# Patient Record
Sex: FEMALE | Race: ASIAN | NOT HISPANIC OR LATINO
[De-identification: names, ages, dates, MRNs, and addresses within clinical notes are randomized per-mention and may not be internally consistent; named-entity substitution may affect disease eponyms.]

---

## 2023-07-10 PROBLEM — Z00.00 ENCOUNTER FOR PREVENTIVE HEALTH EXAMINATION: Status: ACTIVE | Noted: 2023-07-10

## 2023-07-13 ENCOUNTER — APPOINTMENT (OUTPATIENT)
Dept: MAMMOGRAPHY | Facility: CLINIC | Age: 73
End: 2023-07-13
Payer: MEDICAID

## 2023-07-13 ENCOUNTER — OUTPATIENT (OUTPATIENT)
Dept: OUTPATIENT SERVICES | Facility: HOSPITAL | Age: 73
LOS: 1 days | End: 2023-07-13
Payer: MEDICAID

## 2023-07-13 DIAGNOSIS — Z00.8 ENCOUNTER FOR OTHER GENERAL EXAMINATION: ICD-10-CM

## 2023-07-13 PROCEDURE — 77063 BREAST TOMOSYNTHESIS BI: CPT

## 2023-07-13 PROCEDURE — 77063 BREAST TOMOSYNTHESIS BI: CPT | Mod: 26

## 2023-07-13 PROCEDURE — 77067 SCR MAMMO BI INCL CAD: CPT | Mod: 26

## 2023-07-13 PROCEDURE — 77067 SCR MAMMO BI INCL CAD: CPT

## 2023-07-26 ENCOUNTER — APPOINTMENT (OUTPATIENT)
Dept: MAMMOGRAPHY | Facility: CLINIC | Age: 73
End: 2023-07-26
Payer: MEDICAID

## 2023-07-26 ENCOUNTER — OUTPATIENT (OUTPATIENT)
Dept: OUTPATIENT SERVICES | Facility: HOSPITAL | Age: 73
LOS: 1 days | End: 2023-07-26
Payer: MEDICAID

## 2023-07-26 ENCOUNTER — APPOINTMENT (OUTPATIENT)
Dept: ULTRASOUND IMAGING | Facility: CLINIC | Age: 73
End: 2023-07-26
Payer: MEDICAID

## 2023-07-26 DIAGNOSIS — Z00.8 ENCOUNTER FOR OTHER GENERAL EXAMINATION: ICD-10-CM

## 2023-07-26 PROCEDURE — 76642 ULTRASOUND BREAST LIMITED: CPT | Mod: 26,LT

## 2023-07-26 PROCEDURE — G0279: CPT

## 2023-07-26 PROCEDURE — G0279: CPT | Mod: 26

## 2023-07-26 PROCEDURE — 77065 DX MAMMO INCL CAD UNI: CPT

## 2023-07-26 PROCEDURE — 76642 ULTRASOUND BREAST LIMITED: CPT

## 2023-07-26 PROCEDURE — 77065 DX MAMMO INCL CAD UNI: CPT | Mod: 26,LT

## 2023-10-04 ENCOUNTER — APPOINTMENT (OUTPATIENT)
Dept: ORTHOPEDIC SURGERY | Facility: CLINIC | Age: 73
End: 2023-10-04
Payer: MEDICAID

## 2023-10-04 ENCOUNTER — NON-APPOINTMENT (OUTPATIENT)
Age: 73
End: 2023-10-04

## 2023-10-04 VITALS
DIASTOLIC BLOOD PRESSURE: 81 MMHG | SYSTOLIC BLOOD PRESSURE: 137 MMHG | BODY MASS INDEX: 31.89 KG/M2 | HEIGHT: 63 IN | WEIGHT: 180 LBS | HEART RATE: 70 BPM

## 2023-10-04 DIAGNOSIS — Z87.39 PERSONAL HISTORY OF OTHER DISEASES OF THE MUSCULOSKELETAL SYSTEM AND CONNECTIVE TISSUE: ICD-10-CM

## 2023-10-04 DIAGNOSIS — M54.2 CERVICALGIA: ICD-10-CM

## 2023-10-04 DIAGNOSIS — M75.42 IMPINGEMENT SYNDROME OF LEFT SHOULDER: ICD-10-CM

## 2023-10-04 DIAGNOSIS — M50.30 OTHER CERVICAL DISC DEGENERATION, UNSPECIFIED CERVICAL REGION: ICD-10-CM

## 2023-10-04 DIAGNOSIS — M75.41 IMPINGEMENT SYNDROME OF RIGHT SHOULDER: ICD-10-CM

## 2023-10-04 DIAGNOSIS — G89.29 CERVICALGIA: ICD-10-CM

## 2023-10-04 DIAGNOSIS — M75.02 ADHESIVE CAPSULITIS OF LEFT SHOULDER: ICD-10-CM

## 2023-10-04 DIAGNOSIS — M54.12 RADICULOPATHY, CERVICAL REGION: ICD-10-CM

## 2023-10-04 PROCEDURE — 72050 X-RAY EXAM NECK SPINE 4/5VWS: CPT

## 2023-10-04 PROCEDURE — 20610 DRAIN/INJ JOINT/BURSA W/O US: CPT | Mod: LT

## 2023-10-04 PROCEDURE — 72110 X-RAY EXAM L-2 SPINE 4/>VWS: CPT

## 2023-10-04 PROCEDURE — 99204 OFFICE O/P NEW MOD 45 MIN: CPT | Mod: 25

## 2023-10-04 RX ORDER — LIDOCAINE HYDROCHLORIDE 5 MG/ML
0.5 INJECTION, SOLUTION INFILTRATION; PERINEURAL
Refills: 0 | Status: COMPLETED | OUTPATIENT
Start: 2023-10-04

## 2023-10-04 RX ORDER — METHYLPRED ACET/NACL,ISO-OS/PF 40 MG/ML
40 VIAL (ML) INJECTION
Qty: 1 | Refills: 0 | Status: COMPLETED | OUTPATIENT
Start: 2023-10-04

## 2023-10-04 RX ORDER — BUPIVACAINE HCL/PF 2.5 MG/ML
0.25 VIAL (ML) INJECTION
Refills: 0 | Status: COMPLETED | OUTPATIENT
Start: 2023-10-04

## 2023-10-04 RX ADMIN — METHYLPREDNISOLONE ACETATE 1 MG/ML: 40 INJECTION, SUSPENSION INTRA-ARTICULAR; INTRALESIONAL; INTRAMUSCULAR; SOFT TISSUE at 00:00

## 2023-10-04 RX ADMIN — LIDOCAINE HYDROCHLORIDE 3 %: 5 INJECTION, SOLUTION INFILTRATION; INTRAVENOUS at 00:00

## 2023-10-04 RX ADMIN — BUPIVACAINE HYDROCHLORIDE 3 %: 2.5 INJECTION, SOLUTION INFILTRATION; PERINEURAL at 00:00

## 2024-01-08 ENCOUNTER — NON-APPOINTMENT (OUTPATIENT)
Age: 74
End: 2024-01-08

## 2024-01-11 ENCOUNTER — INPATIENT (INPATIENT)
Facility: HOSPITAL | Age: 74
LOS: 4 days | Discharge: HOME | DRG: 872 | End: 2024-01-16
Attending: INTERNAL MEDICINE | Admitting: INTERNAL MEDICINE
Payer: MEDICAID

## 2024-01-11 VITALS
SYSTOLIC BLOOD PRESSURE: 149 MMHG | OXYGEN SATURATION: 97 % | WEIGHT: 182.98 LBS | HEART RATE: 99 BPM | RESPIRATION RATE: 22 BRPM | DIASTOLIC BLOOD PRESSURE: 84 MMHG | HEIGHT: 64 IN | TEMPERATURE: 99 F

## 2024-01-11 DIAGNOSIS — N12 TUBULO-INTERSTITIAL NEPHRITIS, NOT SPECIFIED AS ACUTE OR CHRONIC: ICD-10-CM

## 2024-01-11 LAB
ALBUMIN SERPL ELPH-MCNC: 3.1 G/DL — LOW (ref 3.3–5)
ALBUMIN SERPL ELPH-MCNC: 3.1 G/DL — LOW (ref 3.3–5)
ALP SERPL-CCNC: 169 U/L — HIGH (ref 30–120)
ALP SERPL-CCNC: 169 U/L — HIGH (ref 30–120)
ALT FLD-CCNC: 41 U/L — SIGNIFICANT CHANGE UP (ref 10–60)
ALT FLD-CCNC: 41 U/L — SIGNIFICANT CHANGE UP (ref 10–60)
ANION GAP SERPL CALC-SCNC: 10 MMOL/L — SIGNIFICANT CHANGE UP (ref 5–17)
ANION GAP SERPL CALC-SCNC: 10 MMOL/L — SIGNIFICANT CHANGE UP (ref 5–17)
APPEARANCE UR: CLEAR — SIGNIFICANT CHANGE UP
APPEARANCE UR: CLEAR — SIGNIFICANT CHANGE UP
APTT BLD: 30.9 SEC — SIGNIFICANT CHANGE UP (ref 24.5–35.6)
APTT BLD: 30.9 SEC — SIGNIFICANT CHANGE UP (ref 24.5–35.6)
AST SERPL-CCNC: 30 U/L — SIGNIFICANT CHANGE UP (ref 10–40)
AST SERPL-CCNC: 30 U/L — SIGNIFICANT CHANGE UP (ref 10–40)
BACTERIA # UR AUTO: NEGATIVE /HPF — SIGNIFICANT CHANGE UP
BACTERIA # UR AUTO: NEGATIVE /HPF — SIGNIFICANT CHANGE UP
BASOPHILS # BLD AUTO: 0.03 K/UL — SIGNIFICANT CHANGE UP (ref 0–0.2)
BASOPHILS # BLD AUTO: 0.03 K/UL — SIGNIFICANT CHANGE UP (ref 0–0.2)
BASOPHILS NFR BLD AUTO: 0.3 % — SIGNIFICANT CHANGE UP (ref 0–2)
BASOPHILS NFR BLD AUTO: 0.3 % — SIGNIFICANT CHANGE UP (ref 0–2)
BILIRUB SERPL-MCNC: 0.6 MG/DL — SIGNIFICANT CHANGE UP (ref 0.2–1.2)
BILIRUB SERPL-MCNC: 0.6 MG/DL — SIGNIFICANT CHANGE UP (ref 0.2–1.2)
BILIRUB UR-MCNC: NEGATIVE — SIGNIFICANT CHANGE UP
BILIRUB UR-MCNC: NEGATIVE — SIGNIFICANT CHANGE UP
BUN SERPL-MCNC: 11 MG/DL — SIGNIFICANT CHANGE UP (ref 7–23)
BUN SERPL-MCNC: 11 MG/DL — SIGNIFICANT CHANGE UP (ref 7–23)
CALCIUM SERPL-MCNC: 9.3 MG/DL — SIGNIFICANT CHANGE UP (ref 8.4–10.5)
CALCIUM SERPL-MCNC: 9.3 MG/DL — SIGNIFICANT CHANGE UP (ref 8.4–10.5)
CHLORIDE SERPL-SCNC: 101 MMOL/L — SIGNIFICANT CHANGE UP (ref 96–108)
CHLORIDE SERPL-SCNC: 101 MMOL/L — SIGNIFICANT CHANGE UP (ref 96–108)
CO2 SERPL-SCNC: 23 MMOL/L — SIGNIFICANT CHANGE UP (ref 22–31)
CO2 SERPL-SCNC: 23 MMOL/L — SIGNIFICANT CHANGE UP (ref 22–31)
COLOR SPEC: SIGNIFICANT CHANGE UP
COLOR SPEC: SIGNIFICANT CHANGE UP
CREAT SERPL-MCNC: 0.96 MG/DL — SIGNIFICANT CHANGE UP (ref 0.5–1.3)
CREAT SERPL-MCNC: 0.96 MG/DL — SIGNIFICANT CHANGE UP (ref 0.5–1.3)
DIFF PNL FLD: ABNORMAL
DIFF PNL FLD: ABNORMAL
EGFR: 63 ML/MIN/1.73M2 — SIGNIFICANT CHANGE UP
EGFR: 63 ML/MIN/1.73M2 — SIGNIFICANT CHANGE UP
EOSINOPHIL # BLD AUTO: 0.21 K/UL — SIGNIFICANT CHANGE UP (ref 0–0.5)
EOSINOPHIL # BLD AUTO: 0.21 K/UL — SIGNIFICANT CHANGE UP (ref 0–0.5)
EOSINOPHIL NFR BLD AUTO: 1.8 % — SIGNIFICANT CHANGE UP (ref 0–6)
EOSINOPHIL NFR BLD AUTO: 1.8 % — SIGNIFICANT CHANGE UP (ref 0–6)
EPI CELLS # UR: SIGNIFICANT CHANGE UP
EPI CELLS # UR: SIGNIFICANT CHANGE UP
GLUCOSE SERPL-MCNC: 139 MG/DL — HIGH (ref 70–99)
GLUCOSE SERPL-MCNC: 139 MG/DL — HIGH (ref 70–99)
GLUCOSE UR QL: NEGATIVE MG/DL — SIGNIFICANT CHANGE UP
GLUCOSE UR QL: NEGATIVE MG/DL — SIGNIFICANT CHANGE UP
HCT VFR BLD CALC: 38.3 % — SIGNIFICANT CHANGE UP (ref 34.5–45)
HCT VFR BLD CALC: 38.3 % — SIGNIFICANT CHANGE UP (ref 34.5–45)
HGB BLD-MCNC: 12.5 G/DL — SIGNIFICANT CHANGE UP (ref 11.5–15.5)
HGB BLD-MCNC: 12.5 G/DL — SIGNIFICANT CHANGE UP (ref 11.5–15.5)
IMM GRANULOCYTES NFR BLD AUTO: 0.3 % — SIGNIFICANT CHANGE UP (ref 0–0.9)
IMM GRANULOCYTES NFR BLD AUTO: 0.3 % — SIGNIFICANT CHANGE UP (ref 0–0.9)
INR BLD: 1.21 RATIO — HIGH (ref 0.85–1.18)
INR BLD: 1.21 RATIO — HIGH (ref 0.85–1.18)
KETONES UR-MCNC: NEGATIVE MG/DL — SIGNIFICANT CHANGE UP
KETONES UR-MCNC: NEGATIVE MG/DL — SIGNIFICANT CHANGE UP
LACTATE SERPL-SCNC: 1 MMOL/L — SIGNIFICANT CHANGE UP (ref 0.7–2)
LACTATE SERPL-SCNC: 1 MMOL/L — SIGNIFICANT CHANGE UP (ref 0.7–2)
LEUKOCYTE ESTERASE UR-ACNC: ABNORMAL
LEUKOCYTE ESTERASE UR-ACNC: ABNORMAL
LYMPHOCYTES # BLD AUTO: 17.9 % — SIGNIFICANT CHANGE UP (ref 13–44)
LYMPHOCYTES # BLD AUTO: 17.9 % — SIGNIFICANT CHANGE UP (ref 13–44)
LYMPHOCYTES # BLD AUTO: 2.08 K/UL — SIGNIFICANT CHANGE UP (ref 1–3.3)
LYMPHOCYTES # BLD AUTO: 2.08 K/UL — SIGNIFICANT CHANGE UP (ref 1–3.3)
MCHC RBC-ENTMCNC: 29.2 PG — SIGNIFICANT CHANGE UP (ref 27–34)
MCHC RBC-ENTMCNC: 29.2 PG — SIGNIFICANT CHANGE UP (ref 27–34)
MCHC RBC-ENTMCNC: 32.6 GM/DL — SIGNIFICANT CHANGE UP (ref 32–36)
MCHC RBC-ENTMCNC: 32.6 GM/DL — SIGNIFICANT CHANGE UP (ref 32–36)
MCV RBC AUTO: 89.5 FL — SIGNIFICANT CHANGE UP (ref 80–100)
MCV RBC AUTO: 89.5 FL — SIGNIFICANT CHANGE UP (ref 80–100)
MONOCYTES # BLD AUTO: 1.42 K/UL — HIGH (ref 0–0.9)
MONOCYTES # BLD AUTO: 1.42 K/UL — HIGH (ref 0–0.9)
MONOCYTES NFR BLD AUTO: 12.2 % — SIGNIFICANT CHANGE UP (ref 2–14)
MONOCYTES NFR BLD AUTO: 12.2 % — SIGNIFICANT CHANGE UP (ref 2–14)
NEUTROPHILS # BLD AUTO: 7.82 K/UL — HIGH (ref 1.8–7.4)
NEUTROPHILS # BLD AUTO: 7.82 K/UL — HIGH (ref 1.8–7.4)
NEUTROPHILS NFR BLD AUTO: 67.5 % — SIGNIFICANT CHANGE UP (ref 43–77)
NEUTROPHILS NFR BLD AUTO: 67.5 % — SIGNIFICANT CHANGE UP (ref 43–77)
NITRITE UR-MCNC: POSITIVE
NITRITE UR-MCNC: POSITIVE
NRBC # BLD: 0 /100 WBCS — SIGNIFICANT CHANGE UP (ref 0–0)
NRBC # BLD: 0 /100 WBCS — SIGNIFICANT CHANGE UP (ref 0–0)
PH UR: 7 — SIGNIFICANT CHANGE UP (ref 5–8)
PH UR: 7 — SIGNIFICANT CHANGE UP (ref 5–8)
PLATELET # BLD AUTO: 290 K/UL — SIGNIFICANT CHANGE UP (ref 150–400)
PLATELET # BLD AUTO: 290 K/UL — SIGNIFICANT CHANGE UP (ref 150–400)
POTASSIUM SERPL-MCNC: 4 MMOL/L — SIGNIFICANT CHANGE UP (ref 3.5–5.3)
POTASSIUM SERPL-MCNC: 4 MMOL/L — SIGNIFICANT CHANGE UP (ref 3.5–5.3)
POTASSIUM SERPL-SCNC: 4 MMOL/L — SIGNIFICANT CHANGE UP (ref 3.5–5.3)
POTASSIUM SERPL-SCNC: 4 MMOL/L — SIGNIFICANT CHANGE UP (ref 3.5–5.3)
PROT SERPL-MCNC: 8 G/DL — SIGNIFICANT CHANGE UP (ref 6–8.3)
PROT SERPL-MCNC: 8 G/DL — SIGNIFICANT CHANGE UP (ref 6–8.3)
PROT UR-MCNC: 30 MG/DL
PROT UR-MCNC: 30 MG/DL
PROTHROM AB SERPL-ACNC: 13.1 SEC — HIGH (ref 9.5–13)
PROTHROM AB SERPL-ACNC: 13.1 SEC — HIGH (ref 9.5–13)
RBC # BLD: 4.28 M/UL — SIGNIFICANT CHANGE UP (ref 3.8–5.2)
RBC # BLD: 4.28 M/UL — SIGNIFICANT CHANGE UP (ref 3.8–5.2)
RBC # FLD: 14.4 % — SIGNIFICANT CHANGE UP (ref 10.3–14.5)
RBC # FLD: 14.4 % — SIGNIFICANT CHANGE UP (ref 10.3–14.5)
RBC CASTS # UR COMP ASSIST: 1 /HPF — SIGNIFICANT CHANGE UP (ref 0–4)
RBC CASTS # UR COMP ASSIST: 1 /HPF — SIGNIFICANT CHANGE UP (ref 0–4)
SODIUM SERPL-SCNC: 134 MMOL/L — LOW (ref 135–145)
SODIUM SERPL-SCNC: 134 MMOL/L — LOW (ref 135–145)
SP GR SPEC: 1.03 — SIGNIFICANT CHANGE UP (ref 1–1.03)
SP GR SPEC: 1.03 — SIGNIFICANT CHANGE UP (ref 1–1.03)
UROBILINOGEN FLD QL: 0.2 MG/DL — SIGNIFICANT CHANGE UP (ref 0.2–1)
UROBILINOGEN FLD QL: 0.2 MG/DL — SIGNIFICANT CHANGE UP (ref 0.2–1)
WBC # BLD: 11.6 K/UL — HIGH (ref 3.8–10.5)
WBC # BLD: 11.6 K/UL — HIGH (ref 3.8–10.5)
WBC # FLD AUTO: 11.6 K/UL — HIGH (ref 3.8–10.5)
WBC # FLD AUTO: 11.6 K/UL — HIGH (ref 3.8–10.5)
WBC UR QL: 9 /HPF — HIGH (ref 0–5)
WBC UR QL: 9 /HPF — HIGH (ref 0–5)

## 2024-01-11 PROCEDURE — 71045 X-RAY EXAM CHEST 1 VIEW: CPT | Mod: 26

## 2024-01-11 PROCEDURE — 99285 EMERGENCY DEPT VISIT HI MDM: CPT

## 2024-01-11 PROCEDURE — 93010 ELECTROCARDIOGRAM REPORT: CPT

## 2024-01-11 PROCEDURE — 74177 CT ABD & PELVIS W/CONTRAST: CPT | Mod: 26,MA

## 2024-01-11 RX ORDER — ATORVASTATIN CALCIUM 80 MG/1
10 TABLET, FILM COATED ORAL AT BEDTIME
Refills: 0 | Status: DISCONTINUED | OUTPATIENT
Start: 2024-01-11 | End: 2024-01-16

## 2024-01-11 RX ORDER — GABAPENTIN 400 MG/1
1 CAPSULE ORAL
Refills: 0 | DISCHARGE

## 2024-01-11 RX ORDER — PANTOPRAZOLE SODIUM 20 MG/1
1 TABLET, DELAYED RELEASE ORAL
Refills: 0 | DISCHARGE

## 2024-01-11 RX ORDER — ZOLPIDEM TARTRATE 10 MG/1
5 TABLET ORAL AT BEDTIME
Refills: 0 | Status: DISCONTINUED | OUTPATIENT
Start: 2024-01-11 | End: 2024-01-16

## 2024-01-11 RX ORDER — ZOLPIDEM TARTRATE 10 MG/1
1 TABLET ORAL
Refills: 0 | DISCHARGE

## 2024-01-11 RX ORDER — ACETAMINOPHEN 500 MG
650 TABLET ORAL ONCE
Refills: 0 | Status: COMPLETED | OUTPATIENT
Start: 2024-01-11 | End: 2024-01-11

## 2024-01-11 RX ORDER — SODIUM CHLORIDE 9 MG/ML
1700 INJECTION INTRAMUSCULAR; INTRAVENOUS; SUBCUTANEOUS ONCE
Refills: 0 | Status: COMPLETED | OUTPATIENT
Start: 2024-01-11 | End: 2024-01-11

## 2024-01-11 RX ORDER — PANTOPRAZOLE SODIUM 20 MG/1
40 TABLET, DELAYED RELEASE ORAL
Refills: 0 | Status: DISCONTINUED | OUTPATIENT
Start: 2024-01-11 | End: 2024-01-16

## 2024-01-11 RX ORDER — CEFTRIAXONE 500 MG/1
1000 INJECTION, POWDER, FOR SOLUTION INTRAMUSCULAR; INTRAVENOUS ONCE
Refills: 0 | Status: COMPLETED | OUTPATIENT
Start: 2024-01-11 | End: 2024-01-11

## 2024-01-11 RX ORDER — AMLODIPINE BESYLATE 2.5 MG/1
1 TABLET ORAL
Refills: 0 | DISCHARGE

## 2024-01-11 RX ORDER — LEVOTHYROXINE SODIUM 125 MCG
1 TABLET ORAL
Refills: 0 | DISCHARGE

## 2024-01-11 RX ORDER — GABAPENTIN 400 MG/1
300 CAPSULE ORAL
Refills: 0 | Status: DISCONTINUED | OUTPATIENT
Start: 2024-01-11 | End: 2024-01-16

## 2024-01-11 RX ORDER — LEVOTHYROXINE SODIUM 125 MCG
50 TABLET ORAL DAILY
Refills: 0 | Status: DISCONTINUED | OUTPATIENT
Start: 2024-01-11 | End: 2024-01-16

## 2024-01-11 RX ORDER — ACETAMINOPHEN 500 MG
650 TABLET ORAL EVERY 6 HOURS
Refills: 0 | Status: DISCONTINUED | OUTPATIENT
Start: 2024-01-11 | End: 2024-01-16

## 2024-01-11 RX ORDER — SODIUM CHLORIDE 9 MG/ML
1000 INJECTION INTRAMUSCULAR; INTRAVENOUS; SUBCUTANEOUS
Refills: 0 | Status: DISCONTINUED | OUTPATIENT
Start: 2024-01-11 | End: 2024-01-13

## 2024-01-11 RX ORDER — ATORVASTATIN CALCIUM 80 MG/1
1 TABLET, FILM COATED ORAL
Refills: 0 | DISCHARGE

## 2024-01-11 RX ORDER — PROPRANOLOL HCL 160 MG
1 CAPSULE, EXTENDED RELEASE 24HR ORAL
Refills: 0 | DISCHARGE

## 2024-01-11 RX ORDER — AMLODIPINE BESYLATE 2.5 MG/1
10 TABLET ORAL DAILY
Refills: 0 | Status: DISCONTINUED | OUTPATIENT
Start: 2024-01-11 | End: 2024-01-16

## 2024-01-11 RX ORDER — ENOXAPARIN SODIUM 100 MG/ML
40 INJECTION SUBCUTANEOUS EVERY 24 HOURS
Refills: 0 | Status: DISCONTINUED | OUTPATIENT
Start: 2024-01-11 | End: 2024-01-16

## 2024-01-11 RX ORDER — CEFTRIAXONE 500 MG/1
1000 INJECTION, POWDER, FOR SOLUTION INTRAMUSCULAR; INTRAVENOUS EVERY 24 HOURS
Refills: 0 | Status: DISCONTINUED | OUTPATIENT
Start: 2024-01-12 | End: 2024-01-13

## 2024-01-11 RX ADMIN — SODIUM CHLORIDE 1700 MILLILITER(S): 9 INJECTION INTRAMUSCULAR; INTRAVENOUS; SUBCUTANEOUS at 23:32

## 2024-01-11 RX ADMIN — Medication 650 MILLIGRAM(S): at 23:38

## 2024-01-11 RX ADMIN — CEFTRIAXONE 100 MILLIGRAM(S): 500 INJECTION, POWDER, FOR SOLUTION INTRAMUSCULAR; INTRAVENOUS at 20:59

## 2024-01-11 RX ADMIN — SODIUM CHLORIDE 1700 MILLILITER(S): 9 INJECTION INTRAMUSCULAR; INTRAVENOUS; SUBCUTANEOUS at 20:59

## 2024-01-11 RX ADMIN — CEFTRIAXONE 1000 MILLIGRAM(S): 500 INJECTION, POWDER, FOR SOLUTION INTRAMUSCULAR; INTRAVENOUS at 23:00

## 2024-01-11 NOTE — CONSULT NOTE ADULT - SUBJECTIVE AND OBJECTIVE BOX
CHIEF COMPLAINT/ REASON FOR VISIT  .. Patient was seen to address the  issue listed under PROBLEM LIST which is located toward bottom of this note     PACHECO TREVINOTI    SY EDIP 19    Allergies    No Known Allergies    Intolerances        PAST MEDICAL & SURGICAL HISTORY:  HTN (hypertension)      Hypothyroid          FAMILY HISTORY:      Home Medications:  Ambien 10 mg oral tablet: 1 tab(s) orally once a day (at bedtime) (2024 23:07)  amLODIPine 10 mg oral tablet: 1 tab(s) orally once a day (2024 23:07)  atorvastatin 10 mg oral tablet: 1 tab(s) orally once a day (at bedtime) (2024 23:07)  gabapentin 300 mg oral capsule: 1 cap(s) orally 2 times a day (2024 23:07)  levothyroxine 50 mcg (0.05 mg) oral tablet: 1 tab(s) orally once a day (2024 23:07)  pantoprazole 20 mg oral delayed release tablet: 1 tab(s) orally once a day (at bedtime) (2024 23:07)  propranolol 20 mg oral tablet: 1 tab(s) orally once a day (at bedtime) (2024 23:07)      MEDICATIONS  (STANDING):  acetaminophen     Tablet .. 650 milliGRAM(s) Oral once    MEDICATIONS  (PRN):              Vital Signs Last 24 Hrs  T(C): 37.4 (2024 20:11), Max: 37.4 (2024 20:11)  T(F): 99.4 (2024 20:11), Max: 99.4 (2024 20:11)  HR: 99 (2024 20:11) (99 - 99)  BP: 149/84 (2024 20:11) (149/84 - 149/84)  BP(mean): --  RR: 22 (2024 20:11) (22 - 22)  SpO2: 97% (2024 20:11) (97% - 97%)    Parameters below as of 2024 20:11  Patient On (Oxygen Delivery Method): room air                  LABS:                        12.5   11.60 )-----------( 290      ( 2024 20:48 )             38.3         134<L>  |  101  |  11  ----------------------------<  139<H>  4.0   |  23  |  0.96    Ca    9.3      2024 20:48    TPro  8.0  /  Alb  3.1<L>  /  TBili  0.6  /  DBili  x   /  AST  30  /  ALT  41  /  AlkPhos  169<H>      PT/INR - ( 2024 20:48 )   PT: 13.1 sec;   INR: 1.21 ratio         PTT - ( 2024 20:48 )  PTT:30.9 sec  Urinalysis Basic - ( 2024 22:44 )    Color: Dark Yellow / Appearance: Clear / S.026 / pH: x  Gluc: x / Ketone: Negative mg/dL  / Bili: Negative / Urobili: 0.2 mg/dL   Blood: x / Protein: 30 mg/dL / Nitrite: Positive   Leuk Esterase: Moderate / RBC: 1 /HPF / WBC 9 /HPF   Sq Epi: x / Non Sq Epi: x / Bacteria: Negative /HPF            WBC:  WBC Count: 11.60 K/uL ( @ 20:48)      MICROBIOLOGY:  RECENT CULTURES:              PT/INR - ( 2024 20:48 )   PT: 13.1 sec;   INR: 1.21 ratio         PTT - ( 2024 20:48 )  PTT:30.9 sec    Sodium:  Sodium: 134 mmol/L ( @ 20:48)      0.96 mg/dL  @ 20:48      Hemoglobin:  Hemoglobin: 12.5 g/dL ( @ 20:48)      Platelets: Platelet Count - Automated: 290 K/uL ( @ 20:48)      LIVER FUNCTIONS - ( 2024 20:48 )  Alb: 3.1 g/dL / Pro: 8.0 g/dL / ALK PHOS: 169 U/L / ALT: 41 U/L / AST: 30 U/L / GGT: x             Urinalysis Basic - ( 2024 22:44 )    Color: Dark Yellow / Appearance: Clear / S.026 / pH: x  Gluc: x / Ketone: Negative mg/dL  / Bili: Negative / Urobili: 0.2 mg/dL   Blood: x / Protein: 30 mg/dL / Nitrite: Positive   Leuk Esterase: Moderate / RBC: 1 /HPF / WBC 9 /HPF   Sq Epi: x / Non Sq Epi: x / Bacteria: Negative /HPF        RADIOLOGY & ADDITIONAL STUDIES:      MICROBIOLOGY:  RECENT CULTURES:

## 2024-01-11 NOTE — ED ADULT TRIAGE NOTE - AS HEIGHT TYPE
stated Metronidazole Pregnancy And Lactation Text: This medication is Pregnancy Category B and considered safe during pregnancy.  It is also excreted in breast milk.

## 2024-01-11 NOTE — ED PROVIDER NOTE - CLINICAL SUMMARY MEDICAL DECISION MAKING FREE TEXT BOX
attg note:73 y F c/o bilat flank pain, was having urinary symptoms since about 5d ago, was seen at [facility] and was started on oral antibiotics, reports taking 3 dosee attg note:73 y F c/o bilat flank pain, was having urinary symptoms since about 5d ago, was seen at [facility] and was started on oral antibiotics, reports taking 3 dosee attg note:73 y F c/o bilat flank pain, was having urinary symptoms since about 5d ago, was seen at [facility] and was started on oral antibiotics, reports taking 3 doses but symptoms progressed; on exam pt wd, wn, nad; abd - soft, nd, +bilat flank ttp; MDM has UTI, symptoms progressed despite oral abx, ct showing bilat pyelo, will need admission for iv abx attg note:73 y F c/o bilat flank pain, was having urinary symptoms since about 5d ago, was seen at [facility] and was started on oral antibiotics, reports taking 3 doses but symptoms progressed; on exam pt wd, wn, nad; abd - soft, nd, +bilat flank ttp; MDM has UTI, symptoms progressed despite oral abx, ct showing bilat pyelo, will need admission for iv abx

## 2024-01-11 NOTE — ED PROVIDER NOTE - OBJECTIVE STATEMENT
Patient is a 73-year-old female with past medical history of hypertension and hypothyroidism presents with abdominal pain for days.  Patient reports diffuse abdominal pain rating to the left flank with dysuria.  Patient reports associated fever 101 Tmax for the past week.  Patient went to urgent care 2 days ago and was diagnosed with a UTI and given Bactrim which patient took 3 doses.  Patient took Tylenol for fever last dose was at 6 PM today.  Patient denies chest pain, shortness of breath, cough, nausea, vomiting, diarrhea, rash.

## 2024-01-11 NOTE — ED ADULT NURSE NOTE - NSFALLUNIVINTERV_ED_ALL_ED
Bed/Stretcher in lowest position, wheels locked, appropriate side rails in place/Call bell, personal items and telephone in reach/Instruct patient to call for assistance before getting out of bed/chair/stretcher/Non-slip footwear applied when patient is off stretcher/[place] to call system/Physically safe environment - no spills, clutter or unnecessary equipment/Purposeful proactive rounding/Room/bathroom lighting operational, light cord in reach Bed/Stretcher in lowest position, wheels locked, appropriate side rails in place/Call bell, personal items and telephone in reach/Instruct patient to call for assistance before getting out of bed/chair/stretcher/Non-slip footwear applied when patient is off stretcher/[place] to call system/Physically safe environment - no spills, clutter or unnecessary equipment/Purposeful proactive rounding/Room/bathroom lighting operational, light cord in reach

## 2024-01-11 NOTE — ED PROVIDER NOTE - STUDIES
EKG - see results section for interpretation EKG - see results section for interpretation/Xray Image(s) - see wet read section for interpretation

## 2024-01-11 NOTE — CONSULT NOTE ADULT - ASSESSMENT
Initial evaluation/Pulmonary Critical Care consultation requested by DR FERNANDEZ  on  1/11/2024 from Dr Eleazar Delaney    Patient examined chart reviewed    HOSPITAL ADMISSION   PATIENT CAME  FROM (if information available)      ABGS.   .  VS/ PO/IO/ VENT/ DRIPS.   1/11/2024 99f 99 140/80   1/11/2024 ra 97%      PRESENTATION.  . CC 1/11/2024.pain abdomen uti   . HPI 1/11/2024.  . 73 f with pain abdomen several days diffuse pain more l flank with dysuria and fever 101   . Pt was given bactrim for uti at urgent care on 1/9   . PMHx . hytn hypothyroid   . ER MEDS .  . 1/11/2024 8p rocephin   . 1/11/2024 ns 1700     PROBLEM DATA.  INFECTION  . SEPSIS   . PYELONEPHRITIS   .. W 1/11/2024 w 11.6  .. ua 1/11/2024 ua e 9 le mod n (+)   .. CTAP ic 1/11/2024   .... bl pyelonephritis concomitant pyelitis and cystitis   . 1/11/2024 rocephin    . HEMODYNAMICS  .. la 1/11/2024 la 1   . 1/11/2024 BP ok    HEMAT  .. Hb 1/11/2024 Hb 12.5   .. inr 1/11/2024 inr 121     . HYPONATREMIA   .. Na 1/11/2024 na 134   .. K 1/11/2024 K 4  .. Cr 1/11/2024 cr .9     . ELEVATED LFTS   .. ap 1/11/2024 ap 169  .. ast 1/11/2024 ast 30  .. alt 1/11/2024 alt 41   . .  .  . SUMMARY.     . 73 f PMH Hytn hypothyroid admitted 1/11/2024 with pain abdomen several days diffuse pain more l flank with dysuria and fever 101 CT cw bl pyelonephritis started on rocephin 1/11/2024   . Pulm Crit Care consulted 1/11/2024 for sepsis     . OVERALL PLAN.   . Pyelonephritis  .. Rocephin    . Hyponatremia  .. IV NS     . DISPO.  .. Rx pyelonephritis with rocephin    TIME SPENT.  . Over 55 minutes aggregate care time spent on encounter; activities included   direct patient care, counseling and/or coordinating care reviewing notes, lab data/ imaging , discussion with multidisciplinary team/ patient  /family and explaining in detail risks, benefits, alternatives  of the recommendations     PATIENT.  . ELO BERGMAN 73 f 1/11/2024 1951 DR EDMOND GASCA

## 2024-01-11 NOTE — ED ADULT NURSE NOTE - OBJECTIVE STATEMENT
Pt is alert and oriented. Pt states that she has had a fever for 6 days. Pt states that she was diagnosed with a uti 4 days ago and was started on Bactrim 2 days ago. Pt states that she has had 4-5 days of generalized abdominal pain and the pain is now radiating to her bilateral flank. +dysuria. Pt denies sob, chest pain, nausea, vomiting, and dizziness. Pt resp are even and unlabored, skin color tee for race. Pt updated on plan of care.

## 2024-01-12 DIAGNOSIS — Z29.9 ENCOUNTER FOR PROPHYLACTIC MEASURES, UNSPECIFIED: ICD-10-CM

## 2024-01-12 DIAGNOSIS — R50.9 FEVER, UNSPECIFIED: ICD-10-CM

## 2024-01-12 DIAGNOSIS — N10 ACUTE PYELONEPHRITIS: ICD-10-CM

## 2024-01-12 DIAGNOSIS — G62.9 POLYNEUROPATHY, UNSPECIFIED: ICD-10-CM

## 2024-01-12 DIAGNOSIS — I10 ESSENTIAL (PRIMARY) HYPERTENSION: ICD-10-CM

## 2024-01-12 DIAGNOSIS — K21.9 GASTRO-ESOPHAGEAL REFLUX DISEASE WITHOUT ESOPHAGITIS: ICD-10-CM

## 2024-01-12 DIAGNOSIS — E03.9 HYPOTHYROIDISM, UNSPECIFIED: ICD-10-CM

## 2024-01-12 DIAGNOSIS — R92.8 OTHER ABNORMAL AND INCONCLUSIVE FINDINGS ON DIAGNOSTIC IMAGING OF BREAST: ICD-10-CM

## 2024-01-12 DIAGNOSIS — G47.00 INSOMNIA, UNSPECIFIED: ICD-10-CM

## 2024-01-12 LAB
ALBUMIN SERPL ELPH-MCNC: 2.8 G/DL — LOW (ref 3.3–5)
ALBUMIN SERPL ELPH-MCNC: 2.8 G/DL — LOW (ref 3.3–5)
ALP SERPL-CCNC: 140 U/L — HIGH (ref 30–120)
ALP SERPL-CCNC: 140 U/L — HIGH (ref 30–120)
ALT FLD-CCNC: 32 U/L — SIGNIFICANT CHANGE UP (ref 10–60)
ALT FLD-CCNC: 32 U/L — SIGNIFICANT CHANGE UP (ref 10–60)
ANION GAP SERPL CALC-SCNC: 13 MMOL/L — SIGNIFICANT CHANGE UP (ref 5–17)
ANION GAP SERPL CALC-SCNC: 13 MMOL/L — SIGNIFICANT CHANGE UP (ref 5–17)
AST SERPL-CCNC: 25 U/L — SIGNIFICANT CHANGE UP (ref 10–40)
AST SERPL-CCNC: 25 U/L — SIGNIFICANT CHANGE UP (ref 10–40)
BILIRUB SERPL-MCNC: 0.5 MG/DL — SIGNIFICANT CHANGE UP (ref 0.2–1.2)
BILIRUB SERPL-MCNC: 0.5 MG/DL — SIGNIFICANT CHANGE UP (ref 0.2–1.2)
BUN SERPL-MCNC: 7 MG/DL — SIGNIFICANT CHANGE UP (ref 7–23)
BUN SERPL-MCNC: 7 MG/DL — SIGNIFICANT CHANGE UP (ref 7–23)
CALCIUM SERPL-MCNC: 8.7 MG/DL — SIGNIFICANT CHANGE UP (ref 8.4–10.5)
CALCIUM SERPL-MCNC: 8.7 MG/DL — SIGNIFICANT CHANGE UP (ref 8.4–10.5)
CHLORIDE SERPL-SCNC: 102 MMOL/L — SIGNIFICANT CHANGE UP (ref 96–108)
CHLORIDE SERPL-SCNC: 102 MMOL/L — SIGNIFICANT CHANGE UP (ref 96–108)
CO2 SERPL-SCNC: 21 MMOL/L — LOW (ref 22–31)
CO2 SERPL-SCNC: 21 MMOL/L — LOW (ref 22–31)
CREAT SERPL-MCNC: 0.77 MG/DL — SIGNIFICANT CHANGE UP (ref 0.5–1.3)
CREAT SERPL-MCNC: 0.77 MG/DL — SIGNIFICANT CHANGE UP (ref 0.5–1.3)
EGFR: 82 ML/MIN/1.73M2 — SIGNIFICANT CHANGE UP
EGFR: 82 ML/MIN/1.73M2 — SIGNIFICANT CHANGE UP
GLUCOSE SERPL-MCNC: 118 MG/DL — HIGH (ref 70–99)
GLUCOSE SERPL-MCNC: 118 MG/DL — HIGH (ref 70–99)
HCT VFR BLD CALC: 34.5 % — SIGNIFICANT CHANGE UP (ref 34.5–45)
HCT VFR BLD CALC: 34.5 % — SIGNIFICANT CHANGE UP (ref 34.5–45)
HGB BLD-MCNC: 11.3 G/DL — LOW (ref 11.5–15.5)
HGB BLD-MCNC: 11.3 G/DL — LOW (ref 11.5–15.5)
INR BLD: 1.34 RATIO — HIGH (ref 0.85–1.18)
INR BLD: 1.34 RATIO — HIGH (ref 0.85–1.18)
MCHC RBC-ENTMCNC: 29.4 PG — SIGNIFICANT CHANGE UP (ref 27–34)
MCHC RBC-ENTMCNC: 29.4 PG — SIGNIFICANT CHANGE UP (ref 27–34)
MCHC RBC-ENTMCNC: 32.8 GM/DL — SIGNIFICANT CHANGE UP (ref 32–36)
MCHC RBC-ENTMCNC: 32.8 GM/DL — SIGNIFICANT CHANGE UP (ref 32–36)
MCV RBC AUTO: 89.8 FL — SIGNIFICANT CHANGE UP (ref 80–100)
MCV RBC AUTO: 89.8 FL — SIGNIFICANT CHANGE UP (ref 80–100)
NRBC # BLD: 0 /100 WBCS — SIGNIFICANT CHANGE UP (ref 0–0)
NRBC # BLD: 0 /100 WBCS — SIGNIFICANT CHANGE UP (ref 0–0)
PLATELET # BLD AUTO: 257 K/UL — SIGNIFICANT CHANGE UP (ref 150–400)
PLATELET # BLD AUTO: 257 K/UL — SIGNIFICANT CHANGE UP (ref 150–400)
POTASSIUM SERPL-MCNC: 4 MMOL/L — SIGNIFICANT CHANGE UP (ref 3.5–5.3)
POTASSIUM SERPL-MCNC: 4 MMOL/L — SIGNIFICANT CHANGE UP (ref 3.5–5.3)
POTASSIUM SERPL-SCNC: 4 MMOL/L — SIGNIFICANT CHANGE UP (ref 3.5–5.3)
POTASSIUM SERPL-SCNC: 4 MMOL/L — SIGNIFICANT CHANGE UP (ref 3.5–5.3)
PROCALCITONIN SERPL-MCNC: 0.3 NG/ML — HIGH (ref 0.02–0.1)
PROCALCITONIN SERPL-MCNC: 0.3 NG/ML — HIGH (ref 0.02–0.1)
PROT SERPL-MCNC: 7.2 G/DL — SIGNIFICANT CHANGE UP (ref 6–8.3)
PROT SERPL-MCNC: 7.2 G/DL — SIGNIFICANT CHANGE UP (ref 6–8.3)
PROTHROM AB SERPL-ACNC: 14.5 SEC — HIGH (ref 9.5–13)
PROTHROM AB SERPL-ACNC: 14.5 SEC — HIGH (ref 9.5–13)
RAPID RVP RESULT: SIGNIFICANT CHANGE UP
RAPID RVP RESULT: SIGNIFICANT CHANGE UP
RBC # BLD: 3.84 M/UL — SIGNIFICANT CHANGE UP (ref 3.8–5.2)
RBC # BLD: 3.84 M/UL — SIGNIFICANT CHANGE UP (ref 3.8–5.2)
RBC # FLD: 14.2 % — SIGNIFICANT CHANGE UP (ref 10.3–14.5)
RBC # FLD: 14.2 % — SIGNIFICANT CHANGE UP (ref 10.3–14.5)
SARS-COV-2 RNA SPEC QL NAA+PROBE: SIGNIFICANT CHANGE UP
SARS-COV-2 RNA SPEC QL NAA+PROBE: SIGNIFICANT CHANGE UP
SODIUM SERPL-SCNC: 136 MMOL/L — SIGNIFICANT CHANGE UP (ref 135–145)
SODIUM SERPL-SCNC: 136 MMOL/L — SIGNIFICANT CHANGE UP (ref 135–145)
WBC # BLD: 10.04 K/UL — SIGNIFICANT CHANGE UP (ref 3.8–10.5)
WBC # BLD: 10.04 K/UL — SIGNIFICANT CHANGE UP (ref 3.8–10.5)
WBC # FLD AUTO: 10.04 K/UL — SIGNIFICANT CHANGE UP (ref 3.8–10.5)
WBC # FLD AUTO: 10.04 K/UL — SIGNIFICANT CHANGE UP (ref 3.8–10.5)

## 2024-01-12 PROCEDURE — 71250 CT THORAX DX C-: CPT | Mod: 26

## 2024-01-12 RX ORDER — LACTOBACILLUS ACIDOPHILUS 100MM CELL
1 CAPSULE ORAL EVERY 12 HOURS
Refills: 0 | Status: DISCONTINUED | OUTPATIENT
Start: 2024-01-12 | End: 2024-01-16

## 2024-01-12 RX ORDER — LANOLIN ALCOHOL/MO/W.PET/CERES
3 CREAM (GRAM) TOPICAL AT BEDTIME
Refills: 0 | Status: DISCONTINUED | OUTPATIENT
Start: 2024-01-12 | End: 2024-01-16

## 2024-01-12 RX ORDER — SENNA PLUS 8.6 MG/1
2 TABLET ORAL AT BEDTIME
Refills: 0 | Status: DISCONTINUED | OUTPATIENT
Start: 2024-01-12 | End: 2024-01-16

## 2024-01-12 RX ORDER — HYDRALAZINE HCL 50 MG
10 TABLET ORAL THREE TIMES A DAY
Refills: 0 | Status: DISCONTINUED | OUTPATIENT
Start: 2024-01-12 | End: 2024-01-16

## 2024-01-12 RX ORDER — ONDANSETRON 8 MG/1
4 TABLET, FILM COATED ORAL EVERY 8 HOURS
Refills: 0 | Status: DISCONTINUED | OUTPATIENT
Start: 2024-01-12 | End: 2024-01-16

## 2024-01-12 RX ORDER — MAGNESIUM HYDROXIDE 400 MG/1
30 TABLET, CHEWABLE ORAL DAILY
Refills: 0 | Status: DISCONTINUED | OUTPATIENT
Start: 2024-01-12 | End: 2024-01-16

## 2024-01-12 RX ORDER — TRAMADOL HYDROCHLORIDE 50 MG/1
50 TABLET ORAL THREE TIMES A DAY
Refills: 0 | Status: DISCONTINUED | OUTPATIENT
Start: 2024-01-12 | End: 2024-01-16

## 2024-01-12 RX ADMIN — TRAMADOL HYDROCHLORIDE 50 MILLIGRAM(S): 50 TABLET ORAL at 14:50

## 2024-01-12 RX ADMIN — Medication 50 MICROGRAM(S): at 05:35

## 2024-01-12 RX ADMIN — Medication 650 MILLIGRAM(S): at 17:32

## 2024-01-12 RX ADMIN — ATORVASTATIN CALCIUM 10 MILLIGRAM(S): 80 TABLET, FILM COATED ORAL at 22:00

## 2024-01-12 RX ADMIN — PANTOPRAZOLE SODIUM 40 MILLIGRAM(S): 20 TABLET, DELAYED RELEASE ORAL at 07:17

## 2024-01-12 RX ADMIN — AMLODIPINE BESYLATE 10 MILLIGRAM(S): 2.5 TABLET ORAL at 05:35

## 2024-01-12 RX ADMIN — GABAPENTIN 300 MILLIGRAM(S): 400 CAPSULE ORAL at 05:35

## 2024-01-12 RX ADMIN — Medication 650 MILLIGRAM(S): at 05:35

## 2024-01-12 RX ADMIN — Medication 650 MILLIGRAM(S): at 00:18

## 2024-01-12 RX ADMIN — TRAMADOL HYDROCHLORIDE 50 MILLIGRAM(S): 50 TABLET ORAL at 20:38

## 2024-01-12 RX ADMIN — SENNA PLUS 2 TABLET(S): 8.6 TABLET ORAL at 22:00

## 2024-01-12 RX ADMIN — Medication 3 MILLIGRAM(S): at 21:59

## 2024-01-12 RX ADMIN — SODIUM CHLORIDE 70 MILLILITER(S): 9 INJECTION INTRAMUSCULAR; INTRAVENOUS; SUBCUTANEOUS at 00:35

## 2024-01-12 RX ADMIN — Medication 650 MILLIGRAM(S): at 20:38

## 2024-01-12 RX ADMIN — ZOLPIDEM TARTRATE 5 MILLIGRAM(S): 10 TABLET ORAL at 00:25

## 2024-01-12 RX ADMIN — Medication 650 MILLIGRAM(S): at 13:50

## 2024-01-12 RX ADMIN — CEFTRIAXONE 100 MILLIGRAM(S): 500 INJECTION, POWDER, FOR SOLUTION INTRAMUSCULAR; INTRAVENOUS at 21:53

## 2024-01-12 RX ADMIN — ZOLPIDEM TARTRATE 5 MILLIGRAM(S): 10 TABLET ORAL at 23:45

## 2024-01-12 RX ADMIN — Medication 650 MILLIGRAM(S): at 06:34

## 2024-01-12 RX ADMIN — ENOXAPARIN SODIUM 40 MILLIGRAM(S): 100 INJECTION SUBCUTANEOUS at 05:35

## 2024-01-12 RX ADMIN — Medication 1 TABLET(S): at 17:41

## 2024-01-12 RX ADMIN — TRAMADOL HYDROCHLORIDE 50 MILLIGRAM(S): 50 TABLET ORAL at 21:38

## 2024-01-12 RX ADMIN — GABAPENTIN 300 MILLIGRAM(S): 400 CAPSULE ORAL at 17:41

## 2024-01-12 RX ADMIN — TRAMADOL HYDROCHLORIDE 50 MILLIGRAM(S): 50 TABLET ORAL at 13:38

## 2024-01-12 RX ADMIN — Medication 650 MILLIGRAM(S): at 21:30

## 2024-01-12 RX ADMIN — Medication 30 MILLILITER(S): at 21:59

## 2024-01-12 NOTE — ED ADULT NURSE REASSESSMENT NOTE - NS ED NURSE REASSESS COMMENT FT1
daughter in law here, stated that the urine culture from urgent care was revealing e coli in the urine. paged dr cash, daughter in law is talking to dr cash now.

## 2024-01-12 NOTE — DIETITIAN INITIAL EVALUATION ADULT - PERTINENT LABORATORY DATA
01-12    136  |  102  |  7   ----------------------------<  118<H>  4.0   |  21<L>  |  0.77    Ca    8.7      12 Jan 2024 06:30    TPro  7.2  /  Alb  2.8<L>  /  TBili  0.5  /  DBili  x   /  AST  25  /  ALT  32  /  AlkPhos  140<H>  01-12

## 2024-01-12 NOTE — PATIENT PROFILE ADULT - FALL HARM RISK - UNIVERSAL INTERVENTIONS
Bed in lowest position, wheels locked, appropriate side rails in place/Call bell, personal items and telephone in reach/Instruct patient to call for assistance before getting out of bed or chair/Non-slip footwear when patient is out of bed/[place] to call system/Physically safe environment - no spills, clutter or unnecessary equipment/Purposeful Proactive Rounding/Room/bathroom lighting operational, light cord in reach Bed in lowest position, wheels locked, appropriate side rails in place/Call bell, personal items and telephone in reach/Instruct patient to call for assistance before getting out of bed or chair/Non-slip footwear when patient is out of bed/[place] to call system/Physically safe environment - no spills, clutter or unnecessary equipment/Purposeful Proactive Rounding/Room/bathroom lighting operational, light cord in reach

## 2024-01-12 NOTE — H&P ADULT - TIME BILLING
55minutes spent on this visit, 50% visit time spent in care co-ordination with other attendings and counselling patient ,writing admission orders ( see complete and current orders and order section) ,requesting necessary consults ,informing family about status & plan of care .I have discussed care plan with [facility] wellness/admitting /nursing   department ,outpatient PCP , hospital consultants , ER physician & med staff . 55minutes spent on this visit, 50% visit time spent in care co-ordination with other attendings and counselling patient ,writing admission orders ( see complete and current orders and order section) ,requesting necessary consults ,informing family about status & plan of care .I have discussed care plan with [facility] /[facility] wellness/admitting /nursing   department ,outpatient PCP , hospital consultants , ER physician & med staff .

## 2024-01-12 NOTE — CONSULT NOTE ADULT - ASSESSMENT
Full note to follow    D/w Dr. Carrington  Infectious Diseases will continue to follow. Please call with any questions.   Kim Mac M.D.  [facility] Division of Infectious Diseases [phone]  For after 5 P.M. and weekends, please call [phone]   Full note to follow    D/w Dr. Carrington  Infectious Diseases will continue to follow. Please call with any questions.   Kim Mac M.D.  [facility] Division of Infectious Diseases [phone]  For after 5 P.M. and weekends, please call [phone]   Patient is a 73-year-old female with past medical history of hypertension and hypothyroidism presents with abdominal pain for days.  Patient reports diffuse abdominal pain rating to the left flank with dysuria.  Patient reports associated fever 101 Tmax for the past week.  Patient went to urgent care 2 days ago and was diagnosed with a UTI and given Bactrim which patient took 3 doses.      Acute Pyelonephritis  CT abd showed Bilateral pyelonephritis concomitant pyelitis and cystitis. No drainable fluid collection.    Recommendations:   F/u UCx from outpatient (in HIE) and inpatient  C/w ceftriaxone  Trend temps/WBC  Additional care per primary team    D/w son and patient at bedside  D/w Dr. Carrington    Infectious Diseases will continue to follow. Please call with any questions.   Kim Mac M.D.  [facility] Division of Infectious Diseases [phone]  For after 5 P.M. and weekends, please call [phone]   Patient is a 73-year-old female with past medical history of hypertension and hypothyroidism presents with abdominal pain for days.  Patient reports diffuse abdominal pain rating to the left flank with dysuria.  Patient reports associated fever 101 Tmax for the past week.  Patient went to urgent care 2 days ago and was diagnosed with a UTI and given Bactrim which patient took 3 doses.      Acute Pyelonephritis  CT abd showed Bilateral pyelonephritis concomitant pyelitis and cystitis. No drainable fluid collection.    Recommendations:   F/u UCx from outpatient (in HIE) and inpatient  C/w ceftriaxone  Trend temps/WBC  Additional care per primary team    D/w son and patient at bedside  D/w Dr. Carrington    Infectious Diseases will continue to follow. Please call with any questions.   Kim Mac M.D.  [facility] Division of Infectious Diseases [phone]  For after 5 P.M. and weekends, please call [phone]

## 2024-01-12 NOTE — PROGRESS NOTE ADULT - ASSESSMENT
REASON FOR VISIT  .. Management of problems listed below        REVIEW OF SYMPTOMS   Able to give ROS  Yes     RELIABILITY +/-   CONSTITUTIONAL Weakness Yes    ENDOCRINE  No heat or cold intolerance    ALLERGY No hives  Sore throat No stridor  RESP Shortness of breath YES   NEURO New weakness No   CARDIAC   Palpitations No         PHYSICAL EXAM    HEENT Unremarkable  atraumatic   RESP Fair air entry  Harsh breath sound   CARDIAC S1 S2 No S3     NO JVD    ABDOMEN No hepatosplenomegaly   PEDAL EDEMA present No calf tenderness  NO rash       GENERAL DATA .   GOC.  ..  1/12/2024 full code   ICU STAY.  .. no   COVID. ..       BEST PRACTICE ISSUES.    HOB ELEVATN.  .. Yes  DVT PPLX.  ..   1/11 lvnx 40         DIET.   ..  1/11 dash   IV fl. .. 1/11 ns 70    ALLGY. ..     nka  WT. ..   1/11/2024 83  BMI. ..   1/11/2024 31    ABGS.   .  VS/ PO/IO/ VENT/ DRIPS.   1/12/2024 afeb 80 100/60   1/12/2024 ra 95%     PRESENTATION.  . CC 1/11/2024.pain abdomen uti   . HPI 1/11/2024.  . 73 f with pain abdomen several days diffuse pain more l flank with dysuria and fever 101   . Pt was given bactrim for uti at urgent care on 1/9   . PMHx . hytn hypothyroid   . ER MEDS .  . 1/11/2024 8p rocephin   . 1/11/2024 ns 1700     PROBLEM DATA.  INFECTION  . SEPSIS   . PYELONEPHRITIS   .. W 1/11-1/12/2024 w 11.6-10   .. pr 1/12/2024 pr .3   .. ua 1/11/2024 ua e 9 le mod n (+)   .. cxr 1/12/2024   .... susp rml infiltr/atelectasis   .. CTAP ic 1/11/2024   .... bl pyelonephritis concomitant pyelitis and cystitis   . 1/11/2024 rocephin    . RML ATELECTASIS 1/11 CXR     . HEMODYNAMICS  .. la 1/11/2024 la 1   . 1/11/2024 BP ok    HEMAT  .. Hb 1/11-1/12/2024 Hb 12.5 - 11   .. inr 1/11/2024 inr 121     . HYPONATREMIA   .. Na 1/11-1/12/2024 na 134 - 136  .. K 1/11/2024 K 4  .. Cr 1/11/2024 cr .9     . ELEVATED LFTS   .. ap 1/11/2024 ap 169  .. ast 1/11/2024 ast 30  .. alt 1/11/2024 alt 41   . .  . SUMMARY.     . 73 f PMH Hytn hypothyroid admitted 1/11/2024 with pain abdomen several days diffuse pain more l flank with dysuria and fever 101 CT cw bl pyelonephritis started on rocephin 1/11/2024   . Pulm Crit Care consulted 1/11/2024 for sepsis     . OVERALL PLAN.   . Pyelonephritis  .. Rocephin    . RML ATELECTASIS 1/11 CXR   .. 1/12/2024 check ct ch     . Hyponatremia  .. IV NS     . DISPO.  .. Rx pyelonephritis with rocephin    TIME SPENT.  . Over 36 minutes aggregate care time spent on encounter; activities included   direct patient care, counseling and/or coordinating care reviewing notes, lab data/ imaging , discussion with multidisciplinary team/ patient  /family and explaining in detail risks, benefits, alternatives  of the recommendations     PATIENT.  . ELO BERGMAN 73 f 1/11/2024 1951 DR EDMOND GASCA

## 2024-01-12 NOTE — CONSULT NOTE ADULT - SUBJECTIVE AND OBJECTIVE BOX
Opt, Division of Infectious Diseases  LAY Landaverde S. Shah, Y. Patel, G. [facility]  [phone]    PACHECO GASCA  73y, Female  [id number]    HPI--  HPI:  Patient is a 73-year-old female with past medical history of hypertension and hypothyroidism presents with abdominal pain for days.  Patient reports diffuse abdominal pain rating to the left flank with dysuria.  Patient reports associated fever 101 Tmax for the past week.  Patient went to urgent care 2 days ago and was diagnosed with a UTI and given Bactrim which patient took 3 doses.  Patient took Tylenol for fever last dose was at 6 PM today.  Patient denies chest pain, shortness of breath, cough, nausea, vomiting, diarrhea, rash. CT abd showed Bilateral pyelonephritis concomitant pyelitis and cystitis. No drainable   fluid collection. Started on iv abx , septic workup sent , ID cons requested  (12 Jan 2024 06:44)    Pt seen in the ED    Active Medications--  acetaminophen     Tablet .. 650 milliGRAM(s) Oral every 6 hours PRN  aluminum hydroxide/magnesium hydroxide/simethicone Suspension 30 milliLiter(s) Oral every 4 hours PRN  amLODIPine   Tablet 10 milliGRAM(s) Oral daily  atorvastatin 10 milliGRAM(s) Oral at bedtime  cefTRIAXone   IVPB 1000 milliGRAM(s) IV Intermittent every 24 hours  enoxaparin Injectable 40 milliGRAM(s) SubCutaneous every 24 hours  gabapentin 300 milliGRAM(s) Oral two times a day  hydrALAZINE 10 milliGRAM(s) Oral three times a day PRN  lactobacillus acidophilus 1 Tablet(s) Oral every 12 hours  levothyroxine 50 MICROGram(s) Oral daily  magnesium hydroxide Suspension 30 milliLiter(s) Oral daily PRN  melatonin 3 milliGRAM(s) Oral at bedtime PRN  ondansetron Injectable 4 milliGRAM(s) IV Push every 8 hours PRN  pantoprazole    Tablet 40 milliGRAM(s) Oral before breakfast  propranolol 20 milliGRAM(s) Oral at bedtime  senna 2 Tablet(s) Oral at bedtime  sodium chloride 0.9%. 1000 milliLiter(s) IV Continuous <Continuous>  traMADol 50 milliGRAM(s) Oral three times a day PRN  zolpidem 5 milliGRAM(s) Oral at bedtime PRN  zolpidem 5 milliGRAM(s) Oral at bedtime PRN    Antimicrobials:   cefTRIAXone   IVPB 1000 milliGRAM(s) IV Intermittent every 24 hours    Immunologic:     ROS:  CONSTITUTIONAL: No fevers or chills. No weakness or headache. No weight changes.  EYES/ENT: No visual or hearing changes. No sore throat or throat pain .  NECK: No pain or stiffness  RESPIRATORY: No cough, wheezing, or hemoptysis. No shortness of breath  CARDIOVASCULAR: No chest pain or palpitations  GASTROINTESTINAL: No abdominal pain. No nausea or vomiting. No diarrhea or constipation.  GENITOURINARY: No dysuria, frequency or hematuria  NEUROLOGICAL: No numbness or weakness  SKIN: No itching or rashes  PSYCHIATRIC: Pleasant. Appropriate affect    Allergies: No Known Allergies    PMH -- HTN (hypertension)    Hypothyroid    Insomnia    GERD (gastroesophageal reflux disease)    HLD (hyperlipidemia)    Neuropathy      PSH -- No significant past surgical history      FH --   Social History --  EtOH: denies   Tobacco: denies   Drug Use: denies     Travel/Environmental/Occupational History:    Physical Exam--  Vital Signs Last 24 Hrs  T(F): 98.1 (12 Jan 2024 05:37), Max: 99.4 (11 Jan 2024 20:11)  HR: 92 (12 Jan 2024 05:37) (92 - 99)  BP: 174/79 (12 Jan 2024 05:37) (145/69 - 174/79)  RR: 18 (12 Jan 2024 05:37) (18 - 22)  SpO2: 97% (12 Jan 2024 05:37) (96% - 97%)  General: nontoxic-appearing, no acute distress  HEENT: NC/AT, EOMI,   Lungs: Clear bilaterally without rales, wheezing or rhonchi  Heart: Regular rate and rhythm. No murmur, rub or gallop.  Abdomen: Soft. Nondistended. Nontender. No costovertebral angle tenderness.  Extremities: No cyanosis or clubbing. No edema.   Skin: Warm. Dry. Good turgor.     Laboratory & Imaging Data:  CBC:                       11.3   10.04 )-----------( 257      ( 12 Jan 2024 06:30 )             34.5     CMP: 01-12    136  |  102  |  7   ----------------------------<  118<H>  4.0   |  21<L>  |  0.77    Ca    8.7      12 Jan 2024 06:30    TPro  7.2  /  Alb  2.8<L>  /  TBili  0.5  /  DBili  x   /  AST  25  /  ALT  32  /  AlkPhos  140<H>  01-12    LIVER FUNCTIONS - ( 12 Jan 2024 06:30 )  Alb: 2.8 g/dL / Pro: 7.2 g/dL / ALK PHOS: 140 U/L / ALT: 32 U/L / AST: 25 U/L / GGT: x           Urinalysis Basic - ( 12 Jan 2024 06:30 )    Color: x / Appearance: x / SG: x / pH: x  Gluc: 118 mg/dL / Ketone: x  / Bili: x / Urobili: x   Blood: x / Protein: x / Nitrite: x   Leuk Esterase: x / RBC: x / WBC x   Sq Epi: x / Non Sq Epi: x / Bacteria: x        Microbiology: reviewed        Radiology: reviewed    < from: CT Abdomen and Pelvis w/ IV Cont (01.11.24 @ 21:27) >    ACC: [id number] EXAM:  CT ABDOMEN AND PELVIS IC   ORDERED BY: NAFISA DALE     PROCEDURE DATE:  01/11/2024          INTERPRETATION:  CLINICAL INFORMATION: Abdominal pain    COMPARISON: None.    CONTRAST/COMPLICATIONS:  IV Contrast: Omnipaque 350  90 cc administered   10 cc discarded  Oral Contrast: NONE  Complications: None reported at time of study completion    PROCEDURE:  CT of the Abdomen and Pelvis was performed.  Sagittal and coronal reformats were performed.    FINDINGS:  LOWER CHEST: Within normal limits.    LIVER: Within normal limits.  BILE DUCTS: Normal caliber.  GALLBLADDER: Within normal limits.  SPLEEN: Within normal limits.  PANCREAS: Within normal limits.  ADRENALS: Within normal limits.  KIDNEYS/URETERS: Heterogeneous enhancement of bilateral kidneys.   Bilateral urothelial enhancement. No drainable fluid collections. No   hydronephrosis.    BLADDER: Circumferential bladder wall thickening and ossicular   infiltration..  REPRODUCTIVE ORGANS: Uterus and adnexa within normal limits.    BOWEL: No bowel obstruction. Appendix is normal.  PERITONEUM: No ascites.  VESSELS: Within normal limits.  RETROPERITONEUM/LYMPH NODES: No lymphadenopathy.  ABDOMINAL WALL: Within normal limits.  BONES: Degenerative changes. Straightening of the lumbar lordosis.    IMPRESSION:  Bilateral pyelonephritis concomitant pyelitis and cystitis. No drainable   fluid collection.        --- End of Report ---            REMEDIOS CASTRO MD; Attending Radiologist  This document has been electronically signed. Jan 11 2024  9:34PM    < end of copied text >   Opt, Division of Infectious Diseases  LAY Landaverde S. Shah, Y. Patel, G. [facility]  [phone]    PACHECO GASCA  73y, Female  [id number]    HPI--  HPI:  Patient is a 73-year-old female with past medical history of hypertension and hypothyroidism presents with abdominal pain for days.  Patient reports diffuse abdominal pain rating to the left flank with dysuria.  Patient reports associated fever 101 Tmax for the past week.  Patient went to urgent care 2 days ago and was diagnosed with a UTI and given Bactrim which patient took 3 doses.  Patient took Tylenol for fever last dose was at 6 PM today.  Patient denies chest pain, shortness of breath, cough, nausea, vomiting, diarrhea, rash. CT abd showed Bilateral pyelonephritis concomitant pyelitis and cystitis. No drainable   fluid collection. Started on iv abx , septic workup sent , ID cons requested  (12 Jan 2024 06:44)    Pt seen in the ED    Active Medications--  acetaminophen     Tablet .. 650 milliGRAM(s) Oral every 6 hours PRN  aluminum hydroxide/magnesium hydroxide/simethicone Suspension 30 milliLiter(s) Oral every 4 hours PRN  amLODIPine   Tablet 10 milliGRAM(s) Oral daily  atorvastatin 10 milliGRAM(s) Oral at bedtime  cefTRIAXone   IVPB 1000 milliGRAM(s) IV Intermittent every 24 hours  enoxaparin Injectable 40 milliGRAM(s) SubCutaneous every 24 hours  gabapentin 300 milliGRAM(s) Oral two times a day  hydrALAZINE 10 milliGRAM(s) Oral three times a day PRN  lactobacillus acidophilus 1 Tablet(s) Oral every 12 hours  levothyroxine 50 MICROGram(s) Oral daily  magnesium hydroxide Suspension 30 milliLiter(s) Oral daily PRN  melatonin 3 milliGRAM(s) Oral at bedtime PRN  ondansetron Injectable 4 milliGRAM(s) IV Push every 8 hours PRN  pantoprazole    Tablet 40 milliGRAM(s) Oral before breakfast  propranolol 20 milliGRAM(s) Oral at bedtime  senna 2 Tablet(s) Oral at bedtime  sodium chloride 0.9%. 1000 milliLiter(s) IV Continuous <Continuous>  traMADol 50 milliGRAM(s) Oral three times a day PRN  zolpidem 5 milliGRAM(s) Oral at bedtime PRN  zolpidem 5 milliGRAM(s) Oral at bedtime PRN    Antimicrobials:   cefTRIAXone   IVPB 1000 milliGRAM(s) IV Intermittent every 24 hours    Immunologic:     ROS:  CONSTITUTIONAL: No fevers or chills. No weakness or headache. No weight changes.  EYES/ENT: No visual or hearing changes. No sore throat or throat pain .  NECK: No pain or stiffness  RESPIRATORY: No cough, wheezing, or hemoptysis. No shortness of breath  CARDIOVASCULAR: No chest pain or palpitations  GASTROINTESTINAL: No abdominal pain. No nausea or vomiting. No diarrhea or constipation.  GENITOURINARY: No dysuria, frequency or hematuria  NEUROLOGICAL: No numbness or weakness  SKIN: No itching or rashes  PSYCHIATRIC: Pleasant. Appropriate affect    Allergies: No Known Allergies    PMH -- HTN (hypertension)    Hypothyroid    Insomnia    GERD (gastroesophageal reflux disease)    HLD (hyperlipidemia)    Neuropathy      PSH -- No significant past surgical history      FH --   Social History --  EtOH: denies   Tobacco: denies   Drug Use: denies     Travel/Environmental/Occupational History:    Physical Exam--  Vital Signs Last 24 Hrs  T(F): 98.1 (12 Jan 2024 05:37), Max: 99.4 (11 Jan 2024 20:11)  HR: 92 (12 Jan 2024 05:37) (92 - 99)  BP: 174/79 (12 Jan 2024 05:37) (145/69 - 174/79)  RR: 18 (12 Jan 2024 05:37) (18 - 22)  SpO2: 97% (12 Jan 2024 05:37) (96% - 97%)  General: nontoxic-appearing, no acute distress  HEENT: NC/AT, EOMI,   Lungs: Clear bilaterally without rales, wheezing or rhonchi  Heart: Regular rate and rhythm. No murmur, rub or gallop.  Abdomen: Soft. Nondistended. Nontender. No costovertebral angle tenderness.  Extremities: No cyanosis or clubbing. No edema.   Skin: Warm. Dry. Good turgor.     Laboratory & Imaging Data:  CBC:                       11.3   10.04 )-----------( 257      ( 12 Jan 2024 06:30 )             34.5     CMP: 01-12    136  |  102  |  7   ----------------------------<  118<H>  4.0   |  21<L>  |  0.77    Ca    8.7      12 Jan 2024 06:30    TPro  7.2  /  Alb  2.8<L>  /  TBili  0.5  /  DBili  x   /  AST  25  /  ALT  32  /  AlkPhos  140<H>  01-12    LIVER FUNCTIONS - ( 12 Jan 2024 06:30 )  Alb: 2.8 g/dL / Pro: 7.2 g/dL / ALK PHOS: 140 U/L / ALT: 32 U/L / AST: 25 U/L / GGT: x           Urinalysis Basic - ( 12 Jan 2024 06:30 )    Color: x / Appearance: x / SG: x / pH: x  Gluc: 118 mg/dL / Ketone: x  / Bili: x / Urobili: x   Blood: x / Protein: x / Nitrite: x   Leuk Esterase: x / RBC: x / WBC x   Sq Epi: x / Non Sq Epi: x / Bacteria: x        Microbiology: reviewed        Radiology: reviewed    < from: CT Abdomen and Pelvis w/ IV Cont (01.11.24 @ 21:27) >    ACC: [id number] EXAM:  CT ABDOMEN AND PELVIS IC   ORDERED BY: NAFISA DALE     PROCEDURE DATE:  01/11/2024          INTERPRETATION:  CLINICAL INFORMATION: Abdominal pain    COMPARISON: None.    CONTRAST/COMPLICATIONS:  IV Contrast: Omnipaque 350  90 cc administered   10 cc discarded  Oral Contrast: NONE  Complications: None reported at time of study completion    PROCEDURE:  CT of the Abdomen and Pelvis was performed.  Sagittal and coronal reformats were performed.    FINDINGS:  LOWER CHEST: Within normal limits.    LIVER: Within normal limits.  BILE DUCTS: Normal caliber.  GALLBLADDER: Within normal limits.  SPLEEN: Within normal limits.  PANCREAS: Within normal limits.  ADRENALS: Within normal limits.  KIDNEYS/URETERS: Heterogeneous enhancement of bilateral kidneys.   Bilateral urothelial enhancement. No drainable fluid collections. No   hydronephrosis.    BLADDER: Circumferential bladder wall thickening and ossicular   infiltration..  REPRODUCTIVE ORGANS: Uterus and adnexa within normal limits.    BOWEL: No bowel obstruction. Appendix is normal.  PERITONEUM: No ascites.  VESSELS: Within normal limits.  RETROPERITONEUM/LYMPH NODES: No lymphadenopathy.  ABDOMINAL WALL: Within normal limits.  BONES: Degenerative changes. Straightening of the lumbar lordosis.    IMPRESSION:  Bilateral pyelonephritis concomitant pyelitis and cystitis. No drainable   fluid collection.        --- End of Report ---            REMEDIOS CASTRO MD; Attending Radiologist  This document has been electronically signed. Jan 11 2024  9:34PM    < end of copied text >   Opt, Division of Infectious Diseases  LAY Landaverde S. Shah, Y. Patel, G. [facility]  [phone]    PACHECO GASCA  73y, Female  [id number]    HPI--  HPI:  Patient is a 73-year-old female with past medical history of hypertension and hypothyroidism presents with abdominal pain for days.  Patient reports diffuse abdominal pain rating to the left flank with dysuria.  Patient reports associated fever 101 Tmax for the past week.  Patient went to urgent care 2 days ago and was diagnosed with a UTI and given Bactrim which patient took 3 doses.  Patient took Tylenol for fever last dose was at 6 PM today.  Patient denies chest pain, shortness of breath, cough, nausea, vomiting, diarrhea, rash. CT abd showed Bilateral pyelonephritis concomitant pyelitis and cystitis. No drainable   fluid collection. Started on iv abx , septic workup sent , ID cons requested  (12 Jan 2024 06:44)    Pt seen in the ED  Reporting feeling better  Son at bedside, took bactrim x3 doses prior to arrival    Active Medications--  acetaminophen     Tablet .. 650 milliGRAM(s) Oral every 6 hours PRN  aluminum hydroxide/magnesium hydroxide/simethicone Suspension 30 milliLiter(s) Oral every 4 hours PRN  amLODIPine   Tablet 10 milliGRAM(s) Oral daily  atorvastatin 10 milliGRAM(s) Oral at bedtime  cefTRIAXone   IVPB 1000 milliGRAM(s) IV Intermittent every 24 hours  enoxaparin Injectable 40 milliGRAM(s) SubCutaneous every 24 hours  gabapentin 300 milliGRAM(s) Oral two times a day  hydrALAZINE 10 milliGRAM(s) Oral three times a day PRN  lactobacillus acidophilus 1 Tablet(s) Oral every 12 hours  levothyroxine 50 MICROGram(s) Oral daily  magnesium hydroxide Suspension 30 milliLiter(s) Oral daily PRN  melatonin 3 milliGRAM(s) Oral at bedtime PRN  ondansetron Injectable 4 milliGRAM(s) IV Push every 8 hours PRN  pantoprazole    Tablet 40 milliGRAM(s) Oral before breakfast  propranolol 20 milliGRAM(s) Oral at bedtime  senna 2 Tablet(s) Oral at bedtime  sodium chloride 0.9%. 1000 milliLiter(s) IV Continuous <Continuous>  traMADol 50 milliGRAM(s) Oral three times a day PRN  zolpidem 5 milliGRAM(s) Oral at bedtime PRN  zolpidem 5 milliGRAM(s) Oral at bedtime PRN    Antimicrobials:   cefTRIAXone   IVPB 1000 milliGRAM(s) IV Intermittent every 24 hours    Immunologic:     ROS:  CONSTITUTIONAL: No fevers or chills. No weakness or headache. No weight changes.  EYES/ENT: No visual or hearing changes. No sore throat or throat pain .  NECK: No pain or stiffness  RESPIRATORY: No cough, wheezing, or hemoptysis. No shortness of breath  CARDIOVASCULAR: No chest pain or palpitations  GASTROINTESTINAL: No abdominal pain. No nausea or vomiting. No diarrhea or constipation.  GENITOURINARY: No dysuria, frequency or hematuria  NEUROLOGICAL: No numbness or weakness  SKIN: No itching or rashes  PSYCHIATRIC: Pleasant. Appropriate affect    Allergies: No Known Allergies    PMH -- HTN (hypertension)    Hypothyroid    Insomnia    GERD (gastroesophageal reflux disease)    HLD (hyperlipidemia)    Neuropathy      PSH -- No significant past surgical history      FH --   Social History --  EtOH: denies   Tobacco: denies   Drug Use: denies     Travel/Environmental/Occupational History:    Physical Exam--  Vital Signs Last 24 Hrs  T(F): 98.1 (12 Jan 2024 05:37), Max: 99.4 (11 Jan 2024 20:11)  HR: 92 (12 Jan 2024 05:37) (92 - 99)  BP: 174/79 (12 Jan 2024 05:37) (145/69 - 174/79)  RR: 18 (12 Jan 2024 05:37) (18 - 22)  SpO2: 97% (12 Jan 2024 05:37) (96% - 97%)  General: nontoxic-appearing, no acute distress  HEENT: NC/AT, EOMI  Lungs: Clear bilaterally without rales, wheezing or rhonchi  Heart: Regular rate and rhythm. No murmur, rub or gallop.  Abdomen: Soft. Nondistended. Nontender. No costovertebral angle tenderness.  Extremities: No cyanosis or clubbing. No edema.   Skin: Warm. Dry. Good turgor.     Laboratory & Imaging Data:  CBC:                       11.3   10.04 )-----------( 257      ( 12 Jan 2024 06:30 )             34.5     CMP: 01-12    136  |  102  |  7   ----------------------------<  118<H>  4.0   |  21<L>  |  0.77    Ca    8.7      12 Jan 2024 06:30    TPro  7.2  /  Alb  2.8<L>  /  TBili  0.5  /  DBili  x   /  AST  25  /  ALT  32  /  AlkPhos  140<H>  01-12    LIVER FUNCTIONS - ( 12 Jan 2024 06:30 )  Alb: 2.8 g/dL / Pro: 7.2 g/dL / ALK PHOS: 140 U/L / ALT: 32 U/L / AST: 25 U/L / GGT: x           Urinalysis Basic - ( 12 Jan 2024 06:30 )    Color: x / Appearance: x / SG: x / pH: x  Gluc: 118 mg/dL / Ketone: x  / Bili: x / Urobili: x   Blood: x / Protein: x / Nitrite: x   Leuk Esterase: x / RBC: x / WBC x   Sq Epi: x / Non Sq Epi: x / Bacteria: x        Microbiology: reviewed        Radiology: reviewed    < from: CT Abdomen and Pelvis w/ IV Cont (01.11.24 @ 21:27) >    ACC: [id number] EXAM:  CT ABDOMEN AND PELVIS IC   ORDERED BY: NAFISA DALE     PROCEDURE DATE:  01/11/2024          INTERPRETATION:  CLINICAL INFORMATION: Abdominal pain    COMPARISON: None.    CONTRAST/COMPLICATIONS:  IV Contrast: Omnipaque 350  90 cc administered   10 cc discarded  Oral Contrast: NONE  Complications: None reported at time of study completion    PROCEDURE:  CT of the Abdomen and Pelvis was performed.  Sagittal and coronal reformats were performed.    FINDINGS:  LOWER CHEST: Within normal limits.    LIVER: Within normal limits.  BILE DUCTS: Normal caliber.  GALLBLADDER: Within normal limits.  SPLEEN: Within normal limits.  PANCREAS: Within normal limits.  ADRENALS: Within normal limits.  KIDNEYS/URETERS: Heterogeneous enhancement of bilateral kidneys.   Bilateral urothelial enhancement. No drainable fluid collections. No   hydronephrosis.    BLADDER: Circumferential bladder wall thickening and ossicular   infiltration..  REPRODUCTIVE ORGANS: Uterus and adnexa within normal limits.    BOWEL: No bowel obstruction. Appendix is normal.  PERITONEUM: No ascites.  VESSELS: Within normal limits.  RETROPERITONEUM/LYMPH NODES: No lymphadenopathy.  ABDOMINAL WALL: Within normal limits.  BONES: Degenerative changes. Straightening of the lumbar lordosis.    IMPRESSION:  Bilateral pyelonephritis concomitant pyelitis and cystitis. No drainable   fluid collection.        --- End of Report ---            REMEDIOS CASTRO MD; Attending Radiologist  This document has been electronically signed. Jan 11 2024  9:34PM    < end of copied text >   Opt, Division of Infectious Diseases  LAY Landaverde S. Shah, Y. Patel, G. [facility]  [phone]    PACHECO GASCA  73y, Female  [id number]    HPI--  HPI:  Patient is a 73-year-old female with past medical history of hypertension and hypothyroidism presents with abdominal pain for days.  Patient reports diffuse abdominal pain rating to the left flank with dysuria.  Patient reports associated fever 101 Tmax for the past week.  Patient went to urgent care 2 days ago and was diagnosed with a UTI and given Bactrim which patient took 3 doses.  Patient took Tylenol for fever last dose was at 6 PM today.  Patient denies chest pain, shortness of breath, cough, nausea, vomiting, diarrhea, rash. CT abd showed Bilateral pyelonephritis concomitant pyelitis and cystitis. No drainable   fluid collection. Started on iv abx , septic workup sent , ID cons requested  (12 Jan 2024 06:44)    Pt seen in the ED  Reporting feeling better  Son at bedside, took bactrim x3 doses prior to arrival    Active Medications--  acetaminophen     Tablet .. 650 milliGRAM(s) Oral every 6 hours PRN  aluminum hydroxide/magnesium hydroxide/simethicone Suspension 30 milliLiter(s) Oral every 4 hours PRN  amLODIPine   Tablet 10 milliGRAM(s) Oral daily  atorvastatin 10 milliGRAM(s) Oral at bedtime  cefTRIAXone   IVPB 1000 milliGRAM(s) IV Intermittent every 24 hours  enoxaparin Injectable 40 milliGRAM(s) SubCutaneous every 24 hours  gabapentin 300 milliGRAM(s) Oral two times a day  hydrALAZINE 10 milliGRAM(s) Oral three times a day PRN  lactobacillus acidophilus 1 Tablet(s) Oral every 12 hours  levothyroxine 50 MICROGram(s) Oral daily  magnesium hydroxide Suspension 30 milliLiter(s) Oral daily PRN  melatonin 3 milliGRAM(s) Oral at bedtime PRN  ondansetron Injectable 4 milliGRAM(s) IV Push every 8 hours PRN  pantoprazole    Tablet 40 milliGRAM(s) Oral before breakfast  propranolol 20 milliGRAM(s) Oral at bedtime  senna 2 Tablet(s) Oral at bedtime  sodium chloride 0.9%. 1000 milliLiter(s) IV Continuous <Continuous>  traMADol 50 milliGRAM(s) Oral three times a day PRN  zolpidem 5 milliGRAM(s) Oral at bedtime PRN  zolpidem 5 milliGRAM(s) Oral at bedtime PRN    Antimicrobials:   cefTRIAXone   IVPB 1000 milliGRAM(s) IV Intermittent every 24 hours    Immunologic:     ROS:  CONSTITUTIONAL: No fevers or chills. No weakness or headache. No weight changes.  EYES/ENT: No visual or hearing changes. No sore throat or throat pain .  NECK: No pain or stiffness  RESPIRATORY: No cough, wheezing, or hemoptysis. No shortness of breath  CARDIOVASCULAR: No chest pain or palpitations  GASTROINTESTINAL: No abdominal pain. No nausea or vomiting. No diarrhea or constipation.  GENITOURINARY: No dysuria, frequency or hematuria  NEUROLOGICAL: No numbness or weakness  SKIN: No itching or rashes  PSYCHIATRIC: Pleasant. Appropriate affect    Allergies: No Known Allergies    PMH -- HTN (hypertension)    Hypothyroid    Insomnia    GERD (gastroesophageal reflux disease)    HLD (hyperlipidemia)    Neuropathy      PSH -- No significant past surgical history      FH --   Social History --  EtOH: denies   Tobacco: denies   Drug Use: denies     Travel/Environmental/Occupational History:    Physical Exam--  Vital Signs Last 24 Hrs  T(F): 98.1 (12 Jan 2024 05:37), Max: 99.4 (11 Jan 2024 20:11)  HR: 92 (12 Jan 2024 05:37) (92 - 99)  BP: 174/79 (12 Jan 2024 05:37) (145/69 - 174/79)  RR: 18 (12 Jan 2024 05:37) (18 - 22)  SpO2: 97% (12 Jan 2024 05:37) (96% - 97%)  General: nontoxic-appearing, no acute distress  HEENT: NC/AT, EOMI  Lungs: Clear bilaterally without rales, wheezing or rhonchi  Heart: Regular rate and rhythm. No murmur, rub or gallop.  Abdomen: Soft. Nondistended. Nontender. No costovertebral angle tenderness.  Extremities: No cyanosis or clubbing. No edema.   Skin: Warm. Dry. Good turgor.     Laboratory & Imaging Data:  CBC:                       11.3   10.04 )-----------( 257      ( 12 Jan 2024 06:30 )             34.5     CMP: 01-12    136  |  102  |  7   ----------------------------<  118<H>  4.0   |  21<L>  |  0.77    Ca    8.7      12 Jan 2024 06:30    TPro  7.2  /  Alb  2.8<L>  /  TBili  0.5  /  DBili  x   /  AST  25  /  ALT  32  /  AlkPhos  140<H>  01-12    LIVER FUNCTIONS - ( 12 Jan 2024 06:30 )  Alb: 2.8 g/dL / Pro: 7.2 g/dL / ALK PHOS: 140 U/L / ALT: 32 U/L / AST: 25 U/L / GGT: x           Urinalysis Basic - ( 12 Jan 2024 06:30 )    Color: x / Appearance: x / SG: x / pH: x  Gluc: 118 mg/dL / Ketone: x  / Bili: x / Urobili: x   Blood: x / Protein: x / Nitrite: x   Leuk Esterase: x / RBC: x / WBC x   Sq Epi: x / Non Sq Epi: x / Bacteria: x        Microbiology: reviewed        Radiology: reviewed    < from: CT Abdomen and Pelvis w/ IV Cont (01.11.24 @ 21:27) >    ACC: [id number] EXAM:  CT ABDOMEN AND PELVIS IC   ORDERED BY: NAFISA DALE     PROCEDURE DATE:  01/11/2024          INTERPRETATION:  CLINICAL INFORMATION: Abdominal pain    COMPARISON: None.    CONTRAST/COMPLICATIONS:  IV Contrast: Omnipaque 350  90 cc administered   10 cc discarded  Oral Contrast: NONE  Complications: None reported at time of study completion    PROCEDURE:  CT of the Abdomen and Pelvis was performed.  Sagittal and coronal reformats were performed.    FINDINGS:  LOWER CHEST: Within normal limits.    LIVER: Within normal limits.  BILE DUCTS: Normal caliber.  GALLBLADDER: Within normal limits.  SPLEEN: Within normal limits.  PANCREAS: Within normal limits.  ADRENALS: Within normal limits.  KIDNEYS/URETERS: Heterogeneous enhancement of bilateral kidneys.   Bilateral urothelial enhancement. No drainable fluid collections. No   hydronephrosis.    BLADDER: Circumferential bladder wall thickening and ossicular   infiltration..  REPRODUCTIVE ORGANS: Uterus and adnexa within normal limits.    BOWEL: No bowel obstruction. Appendix is normal.  PERITONEUM: No ascites.  VESSELS: Within normal limits.  RETROPERITONEUM/LYMPH NODES: No lymphadenopathy.  ABDOMINAL WALL: Within normal limits.  BONES: Degenerative changes. Straightening of the lumbar lordosis.    IMPRESSION:  Bilateral pyelonephritis concomitant pyelitis and cystitis. No drainable   fluid collection.        --- End of Report ---            REMEDIOS CASTRO MD; Attending Radiologist  This document has been electronically signed. Jan 11 2024  9:34PM    < end of copied text >

## 2024-01-12 NOTE — H&P ADULT - PROBLEM SELECTOR PLAN 8
report from 07/23 reviewed with patient and son - Repeat mammogram in january 2024 , d/w the son and family is aware and already is scheduling test at radiology facility

## 2024-01-12 NOTE — H&P ADULT - NSICDXPASTMEDICALHX_GEN_ALL_CORE_FT
PAST MEDICAL HISTORY:  GERD (gastroesophageal reflux disease)     HLD (hyperlipidemia)     HTN (hypertension)     Hypothyroid     Insomnia     Neuropathy

## 2024-01-12 NOTE — H&P ADULT - ASSESSMENT
Patient is a 73-year-old female with past medical history of hypertension and hypothyroidism presents with abdominal pain for days.  Patient reports diffuse abdominal pain rating to the left flank with dysuria.  Patient reports associated fever 101 Tmax for the past week.  Patient went to urgent care 2 days ago and was diagnosed with a UTI and given Bactrim which patient took 3 doses.  Patient took Tylenol for fever last dose was at 6 PM today.  Patient denies chest pain, shortness of breath, cough, nausea, vomiting, diarrhea, rash. CT abd showed Bilateral pyelonephritis concomitant pyelitis and cystitis. No drainable   fluid collection. Started on iv abx , septic workup sent , ID cons requested

## 2024-01-12 NOTE — PROGRESS NOTE ADULT - SUBJECTIVE AND OBJECTIVE BOX
CHIEF COMPLAINT/ REASON FOR VISIT  .. Patient was seen to address the  issue listed under PROBLEM LIST which is located toward bottom of this note     PACHECO ELO    SY EDIP 19    Allergies    No Known Allergies    Intolerances        PAST MEDICAL & SURGICAL HISTORY:  HTN (hypertension)      Hypothyroid      Insomnia      GERD (gastroesophageal reflux disease)      HLD (hyperlipidemia)      Neuropathy          FAMILY HISTORY:      Home Medications:  Ambien 10 mg oral tablet: 1 tab(s) orally once a day (at bedtime) (2024 23:07)  amLODIPine 10 mg oral tablet: 1 tab(s) orally once a day (2024 23:07)  atorvastatin 10 mg oral tablet: 1 tab(s) orally once a day (at bedtime) (2024 23:07)  gabapentin 300 mg oral capsule: 1 cap(s) orally 2 times a day (2024 23:07)  levothyroxine 50 mcg (0.05 mg) oral tablet: 1 tab(s) orally once a day (2024 23:07)  pantoprazole 20 mg oral delayed release tablet: 1 tab(s) orally once a day (at bedtime) (2024 23:07)  propranolol 20 mg oral tablet: 1 tab(s) orally once a day (at bedtime) (2024 23:07)      MEDICATIONS  (STANDING):  amLODIPine   Tablet 10 milliGRAM(s) Oral daily  atorvastatin 10 milliGRAM(s) Oral at bedtime  cefTRIAXone   IVPB 1000 milliGRAM(s) IV Intermittent every 24 hours  enoxaparin Injectable 40 milliGRAM(s) SubCutaneous every 24 hours  gabapentin 300 milliGRAM(s) Oral two times a day  lactobacillus acidophilus 1 Tablet(s) Oral every 12 hours  levothyroxine 50 MICROGram(s) Oral daily  pantoprazole    Tablet 40 milliGRAM(s) Oral before breakfast  propranolol 20 milliGRAM(s) Oral at bedtime  senna 2 Tablet(s) Oral at bedtime  sodium chloride 0.9%. 1000 milliLiter(s) (70 mL/Hr) IV Continuous <Continuous>    MEDICATIONS  (PRN):  acetaminophen     Tablet .. 650 milliGRAM(s) Oral every 6 hours PRN Temp greater or equal to 38C (100.4F), Mild Pain (1 - 3)  aluminum hydroxide/magnesium hydroxide/simethicone Suspension 30 milliLiter(s) Oral every 4 hours PRN Dyspepsia  hydrALAZINE 10 milliGRAM(s) Oral three times a day PRN Systolic blood pressure >160  magnesium hydroxide Suspension 30 milliLiter(s) Oral daily PRN Constipation  melatonin 3 milliGRAM(s) Oral at bedtime PRN Insomnia  ondansetron Injectable 4 milliGRAM(s) IV Push every 8 hours PRN Nausea and/or Vomiting  traMADol 50 milliGRAM(s) Oral three times a day PRN Moderate Pain (4 - 6)  zolpidem 5 milliGRAM(s) Oral at bedtime PRN Insomnia  zolpidem 5 milliGRAM(s) Oral at bedtime PRN Insomnia      Diet, DASH/TLC:   Sodium & Cholesterol Restricted (24 @ 23:45) [Active]          Vital Signs Last 24 Hrs  T(C): 36.7 (2024 05:37), Max: 37.4 (2024 20:11)  T(F): 98.1 (2024 05:37), Max: 99.4 (2024 20:11)  HR: 92 (2024 05:37) (92 - 99)  BP: 174/79 (2024 05:37) (145/69 - 174/79)  BP(mean): --  RR: 18 (2024 05:37) (18 - 22)  SpO2: 97% (2024 05:37) (96% - 97%)    Parameters below as of 2024 05:37  Patient On (Oxygen Delivery Method): room air                  LABS:                        12.5   11.60 )-----------( 290      ( 2024 20:48 )             38.3         134<L>  |  101  |  11  ----------------------------<  139<H>  4.0   |  23  |  0.96    Ca    9.3      2024 20:48    TPro  8.0  /  Alb  3.1<L>  /  TBili  0.6  /  DBili  x   /  AST  30  /  ALT  41  /  AlkPhos  169<H>  -11    PT/INR - ( 2024 20:48 )   PT: 13.1 sec;   INR: 1.21 ratio         PTT - ( 2024 20:48 )  PTT:30.9 sec  Urinalysis Basic - ( 2024 22:44 )    Color: Dark Yellow / Appearance: Clear / S.026 / pH: x  Gluc: x / Ketone: Negative mg/dL  / Bili: Negative / Urobili: 0.2 mg/dL   Blood: x / Protein: 30 mg/dL / Nitrite: Positive   Leuk Esterase: Moderate / RBC: 1 /HPF / WBC 9 /HPF   Sq Epi: x / Non Sq Epi: x / Bacteria: Negative /HPF            WBC:  WBC Count: 11.60 K/uL ( @ 20:48)      MICROBIOLOGY:  RECENT CULTURES:              PT/INR - ( 2024 20:48 )   PT: 13.1 sec;   INR: 1.21 ratio         PTT - ( 2024 20:48 )  PTT:30.9 sec    Sodium:  Sodium: 134 mmol/L ( @ 20:48)      0.96 mg/dL  @ 20:48      Hemoglobin:  Hemoglobin: 12.5 g/dL ( @ 20:48)      Platelets: Platelet Count - Automated: 290 K/uL ( @ 20:48)      LIVER FUNCTIONS - ( 2024 20:48 )  Alb: 3.1 g/dL / Pro: 8.0 g/dL / ALK PHOS: 169 U/L / ALT: 41 U/L / AST: 30 U/L / GGT: x             Urinalysis Basic - ( 2024 22:44 )    Color: Dark Yellow / Appearance: Clear / S.026 / pH: x  Gluc: x / Ketone: Negative mg/dL  / Bili: Negative / Urobili: 0.2 mg/dL   Blood: x / Protein: 30 mg/dL / Nitrite: Positive   Leuk Esterase: Moderate / RBC: 1 /HPF / WBC 9 /HPF   Sq Epi: x / Non Sq Epi: x / Bacteria: Negative /HPF        RADIOLOGY & ADDITIONAL STUDIES:      MICROBIOLOGY:  RECENT CULTURES:           medical comanagement , right tib/fib fx, SVT, hypotension

## 2024-01-12 NOTE — DIETITIAN INITIAL EVALUATION ADULT - REASON FOR ADMISSION
HPI:  Patient is a 73-year-old female with past medical history of hypertension and hypothyroidism presents with abdominal pain for days.  Patient reports diffuse abdominal pain rating to the left flank with dysuria.  Patient reports associated fever 101 Tmax for the past week.  Patient went to urgent care 2 days ago and was diagnosed with a UTI and given Bactrim which patient took 3 doses.  Patient took Tylenol for fever last dose was at 6 PM today.  Patient denies chest pain, shortness of breath, cough, nausea, vomiting, diarrhea, rash. CT abd showed Bilateral pyelonephritis concomitant pyelitis and cystitis. No drainable   fluid collection. Started on iv abx , septic workup sent , ID cons requested  (12 Jan 2024 06:44)

## 2024-01-12 NOTE — DIETITIAN INITIAL EVALUATION ADULT - PROBLEM SELECTOR PLAN 9
Rotation Flap Text: The defect edges were debeveled with a #15 scalpel blade. Given the location of the defect, shape of the defect and the proximity to free margins a rotation flap was deemed most appropriate. Using a sterile surgical marker, an appropriate rotation flap was drawn incorporating the defect and placing the expected incisions within the relaxed skin tension lines where possible. The area thus outlined was incised deep to adipose tissue with a #15 scalpel blade. The skin margins were undermined to an appropriate distance in all directions utilizing iris scissors. Following this, the designed flap was carried over into the primary defect and sutured into place. Gastrointestinal stress ulcer prophylaxis and DVT prophylaxis administered

## 2024-01-12 NOTE — DIETITIAN INITIAL EVALUATION ADULT - PERTINENT MEDS FT
MEDICATIONS  (STANDING):  amLODIPine   Tablet 10 milliGRAM(s) Oral daily  atorvastatin 10 milliGRAM(s) Oral at bedtime  cefTRIAXone   IVPB 1000 milliGRAM(s) IV Intermittent every 24 hours  enoxaparin Injectable 40 milliGRAM(s) SubCutaneous every 24 hours  gabapentin 300 milliGRAM(s) Oral two times a day  lactobacillus acidophilus 1 Tablet(s) Oral every 12 hours  levothyroxine 50 MICROGram(s) Oral daily  pantoprazole    Tablet 40 milliGRAM(s) Oral before breakfast  propranolol 20 milliGRAM(s) Oral at bedtime  senna 2 Tablet(s) Oral at bedtime  sodium chloride 0.9%. 1000 milliLiter(s) (70 mL/Hr) IV Continuous <Continuous>    MEDICATIONS  (PRN):  acetaminophen     Tablet .. 650 milliGRAM(s) Oral every 6 hours PRN Temp greater or equal to 38C (100.4F), Mild Pain (1 - 3)  aluminum hydroxide/magnesium hydroxide/simethicone Suspension 30 milliLiter(s) Oral every 4 hours PRN Dyspepsia  hydrALAZINE 10 milliGRAM(s) Oral three times a day PRN Systolic blood pressure >160  magnesium hydroxide Suspension 30 milliLiter(s) Oral daily PRN Constipation  melatonin 3 milliGRAM(s) Oral at bedtime PRN Insomnia  ondansetron Injectable 4 milliGRAM(s) IV Push every 8 hours PRN Nausea and/or Vomiting  traMADol 50 milliGRAM(s) Oral three times a day PRN Moderate Pain (4 - 6)  zolpidem 5 milliGRAM(s) Oral at bedtime PRN Insomnia  zolpidem 5 milliGRAM(s) Oral at bedtime PRN Insomnia

## 2024-01-12 NOTE — ED ADULT NURSE REASSESSMENT NOTE - NS ED NURSE REASSESS COMMENT FT1
Spoke with dr cash, per him, has left a msg for dr lala the infectious disease doctor's office, for them to call back the ER for Pt's daughter's concern about the urine culture result.   and per dr cash, continue IVF.

## 2024-01-12 NOTE — DIETITIAN INITIAL EVALUATION ADULT - OTHER INFO
Visited patient in room, presents with poor appetite/po intake, consuming <50% of meals, dislike hospital foods, likes [place] foods. Encourage family at bedside to bring foods from home. Denies n/v/d/c, last BM 1/11, bowel regimen in place. No reported difficulty chewing or swallowing. NKFA. Reported no weight changes, current adm weight 183#, weight appears to be stable, will continue to monitor weight trends as able.     Pertinent medications/nutrition labs reviewed; receiving IVF, antibiotic in house.     Educated on adequate protein/energy intake to prevent weight loss, prioritize protein at each meal. Food preferences obtained, will honor as able to encourage intake. Encourage po intake as needed. RD to continue to monitor nutrition status per protocol.  Visited patient in room, presents with poor appetite/po intake, consuming <50% of meals, dislike hospital foods, likes [place] foods. Encourage family at bedside to bring foods from home. Denies n/v/d/c, last BM 1/11, bowel regimen in place. No reported difficulty chewing or swallowing. NKFA. Reported no weight changes, current adm weight 183#, weight appears to be stable, will continue to monitor weight trends as able.     Pertinent medications/nutrition labs reviewed; receiving IVF, antibiotic in house.     Educated on adequate protein/energy intake to prevent weight loss, prioritize protein at each meal. Food preferences obtained, will honor as able to encourage intake. Encourage po intake as needed. RD to continue to monitor nutrition status per protocol.

## 2024-01-12 NOTE — H&P ADULT - NSHPLABSRESULTS_GEN_ALL_CORE
< from: CT Abdomen and Pelvis w/ IV Cont (01.11.24 @ 21:27) >    LOWER CHEST: Within normal limits.    LIVER: Within normal limits.  BILE DUCTS: Normal caliber.  GALLBLADDER: Within normal limits.  SPLEEN: Within normal limits.  PANCREAS: Within normal limits.  ADRENALS: Within normal limits.  KIDNEYS/URETERS: Heterogeneous enhancement of bilateral kidneys.   Bilateral urothelial enhancement. No drainable fluid collections. No   hydronephrosis.    BLADDER: Circumferential bladder wall thickening and ossicular   infiltration..  REPRODUCTIVE ORGANS: Uterus and adnexa within normal limits.    BOWEL: No bowel obstruction. Appendix is normal.  PERITONEUM: No ascites.  VESSELS: Within normal limits.  RETROPERITONEUM/LYMPH NODES: No lymphadenopathy.  ABDOMINAL WALL: Within normal limits.  BONES: Degenerative changes. Straightening of the lumbar lordosis.    IMPRESSION:  Bilateral pyelonephritis concomitant pyelitis and cystitis. No drainable   fluid collection.    < end of copied text >    < from: Mammo Diagnostic Digital w/ Gautam, Left (07.26.23 @ 08:14) >    IMPRESSION:  No mammographic or sonographic evidence of malignancy.  Probably benign asymmetry in the central left breast which is without a   sonographic correlate for which a six-month follow-up diagnostic left   breast mammogram is recommended.  RECOMMENDATION:  Mammography in 6 months.

## 2024-01-13 DIAGNOSIS — A41.50 GRAM-NEGATIVE SEPSIS, UNSPECIFIED: ICD-10-CM

## 2024-01-13 LAB
-  CTX-M RESISTANCE MARKER: SIGNIFICANT CHANGE UP
-  CTX-M RESISTANCE MARKER: SIGNIFICANT CHANGE UP
-  ESBL: SIGNIFICANT CHANGE UP
-  ESBL: SIGNIFICANT CHANGE UP
ALBUMIN SERPL ELPH-MCNC: 2.8 G/DL — LOW (ref 3.3–5)
ALBUMIN SERPL ELPH-MCNC: 2.8 G/DL — LOW (ref 3.3–5)
ALP SERPL-CCNC: 139 U/L — HIGH (ref 30–120)
ALP SERPL-CCNC: 139 U/L — HIGH (ref 30–120)
ALT FLD-CCNC: 28 U/L — SIGNIFICANT CHANGE UP (ref 10–60)
ALT FLD-CCNC: 28 U/L — SIGNIFICANT CHANGE UP (ref 10–60)
ANION GAP SERPL CALC-SCNC: 9 MMOL/L — SIGNIFICANT CHANGE UP (ref 5–17)
ANION GAP SERPL CALC-SCNC: 9 MMOL/L — SIGNIFICANT CHANGE UP (ref 5–17)
AST SERPL-CCNC: 18 U/L — SIGNIFICANT CHANGE UP (ref 10–40)
AST SERPL-CCNC: 18 U/L — SIGNIFICANT CHANGE UP (ref 10–40)
BASOPHILS # BLD AUTO: 0.02 K/UL — SIGNIFICANT CHANGE UP (ref 0–0.2)
BASOPHILS # BLD AUTO: 0.02 K/UL — SIGNIFICANT CHANGE UP (ref 0–0.2)
BASOPHILS NFR BLD AUTO: 0.3 % — SIGNIFICANT CHANGE UP (ref 0–2)
BASOPHILS NFR BLD AUTO: 0.3 % — SIGNIFICANT CHANGE UP (ref 0–2)
BILIRUB SERPL-MCNC: 0.4 MG/DL — SIGNIFICANT CHANGE UP (ref 0.2–1.2)
BILIRUB SERPL-MCNC: 0.4 MG/DL — SIGNIFICANT CHANGE UP (ref 0.2–1.2)
BUN SERPL-MCNC: 8 MG/DL — SIGNIFICANT CHANGE UP (ref 7–23)
BUN SERPL-MCNC: 8 MG/DL — SIGNIFICANT CHANGE UP (ref 7–23)
CALCIUM SERPL-MCNC: 9 MG/DL — SIGNIFICANT CHANGE UP (ref 8.4–10.5)
CALCIUM SERPL-MCNC: 9 MG/DL — SIGNIFICANT CHANGE UP (ref 8.4–10.5)
CHLORIDE SERPL-SCNC: 103 MMOL/L — SIGNIFICANT CHANGE UP (ref 96–108)
CHLORIDE SERPL-SCNC: 103 MMOL/L — SIGNIFICANT CHANGE UP (ref 96–108)
CO2 SERPL-SCNC: 23 MMOL/L — SIGNIFICANT CHANGE UP (ref 22–31)
CO2 SERPL-SCNC: 23 MMOL/L — SIGNIFICANT CHANGE UP (ref 22–31)
CREAT SERPL-MCNC: 0.8 MG/DL — SIGNIFICANT CHANGE UP (ref 0.5–1.3)
CREAT SERPL-MCNC: 0.8 MG/DL — SIGNIFICANT CHANGE UP (ref 0.5–1.3)
E COLI DNA BLD POS QL NAA+NON-PROBE: SIGNIFICANT CHANGE UP
E COLI DNA BLD POS QL NAA+NON-PROBE: SIGNIFICANT CHANGE UP
EGFR: 78 ML/MIN/1.73M2 — SIGNIFICANT CHANGE UP
EGFR: 78 ML/MIN/1.73M2 — SIGNIFICANT CHANGE UP
EOSINOPHIL # BLD AUTO: 0.27 K/UL — SIGNIFICANT CHANGE UP (ref 0–0.5)
EOSINOPHIL # BLD AUTO: 0.27 K/UL — SIGNIFICANT CHANGE UP (ref 0–0.5)
EOSINOPHIL NFR BLD AUTO: 3.5 % — SIGNIFICANT CHANGE UP (ref 0–6)
EOSINOPHIL NFR BLD AUTO: 3.5 % — SIGNIFICANT CHANGE UP (ref 0–6)
GLUCOSE SERPL-MCNC: 106 MG/DL — HIGH (ref 70–99)
GLUCOSE SERPL-MCNC: 106 MG/DL — HIGH (ref 70–99)
GRAM STN FLD: ABNORMAL
GRAM STN FLD: ABNORMAL
HCT VFR BLD CALC: 35.2 % — SIGNIFICANT CHANGE UP (ref 34.5–45)
HCT VFR BLD CALC: 35.2 % — SIGNIFICANT CHANGE UP (ref 34.5–45)
HCV AB S/CO SERPL IA: 0.13 S/CO — SIGNIFICANT CHANGE UP (ref 0–0.99)
HCV AB S/CO SERPL IA: 0.13 S/CO — SIGNIFICANT CHANGE UP (ref 0–0.99)
HCV AB SERPL-IMP: SIGNIFICANT CHANGE UP
HCV AB SERPL-IMP: SIGNIFICANT CHANGE UP
HGB BLD-MCNC: 11.2 G/DL — LOW (ref 11.5–15.5)
HGB BLD-MCNC: 11.2 G/DL — LOW (ref 11.5–15.5)
IMM GRANULOCYTES NFR BLD AUTO: 0.4 % — SIGNIFICANT CHANGE UP (ref 0–0.9)
IMM GRANULOCYTES NFR BLD AUTO: 0.4 % — SIGNIFICANT CHANGE UP (ref 0–0.9)
LYMPHOCYTES # BLD AUTO: 2.06 K/UL — SIGNIFICANT CHANGE UP (ref 1–3.3)
LYMPHOCYTES # BLD AUTO: 2.06 K/UL — SIGNIFICANT CHANGE UP (ref 1–3.3)
LYMPHOCYTES # BLD AUTO: 26.4 % — SIGNIFICANT CHANGE UP (ref 13–44)
LYMPHOCYTES # BLD AUTO: 26.4 % — SIGNIFICANT CHANGE UP (ref 13–44)
MCHC RBC-ENTMCNC: 28.9 PG — SIGNIFICANT CHANGE UP (ref 27–34)
MCHC RBC-ENTMCNC: 28.9 PG — SIGNIFICANT CHANGE UP (ref 27–34)
MCHC RBC-ENTMCNC: 31.8 GM/DL — LOW (ref 32–36)
MCHC RBC-ENTMCNC: 31.8 GM/DL — LOW (ref 32–36)
MCV RBC AUTO: 91 FL — SIGNIFICANT CHANGE UP (ref 80–100)
MCV RBC AUTO: 91 FL — SIGNIFICANT CHANGE UP (ref 80–100)
METHOD TYPE: SIGNIFICANT CHANGE UP
METHOD TYPE: SIGNIFICANT CHANGE UP
MONOCYTES # BLD AUTO: 1.1 K/UL — HIGH (ref 0–0.9)
MONOCYTES # BLD AUTO: 1.1 K/UL — HIGH (ref 0–0.9)
MONOCYTES NFR BLD AUTO: 14.1 % — HIGH (ref 2–14)
MONOCYTES NFR BLD AUTO: 14.1 % — HIGH (ref 2–14)
NEUTROPHILS # BLD AUTO: 4.33 K/UL — SIGNIFICANT CHANGE UP (ref 1.8–7.4)
NEUTROPHILS # BLD AUTO: 4.33 K/UL — SIGNIFICANT CHANGE UP (ref 1.8–7.4)
NEUTROPHILS NFR BLD AUTO: 55.3 % — SIGNIFICANT CHANGE UP (ref 43–77)
NEUTROPHILS NFR BLD AUTO: 55.3 % — SIGNIFICANT CHANGE UP (ref 43–77)
NRBC # BLD: 0 /100 WBCS — SIGNIFICANT CHANGE UP (ref 0–0)
NRBC # BLD: 0 /100 WBCS — SIGNIFICANT CHANGE UP (ref 0–0)
PLATELET # BLD AUTO: 283 K/UL — SIGNIFICANT CHANGE UP (ref 150–400)
PLATELET # BLD AUTO: 283 K/UL — SIGNIFICANT CHANGE UP (ref 150–400)
POTASSIUM SERPL-MCNC: 3.8 MMOL/L — SIGNIFICANT CHANGE UP (ref 3.5–5.3)
POTASSIUM SERPL-MCNC: 3.8 MMOL/L — SIGNIFICANT CHANGE UP (ref 3.5–5.3)
POTASSIUM SERPL-SCNC: 3.8 MMOL/L — SIGNIFICANT CHANGE UP (ref 3.5–5.3)
POTASSIUM SERPL-SCNC: 3.8 MMOL/L — SIGNIFICANT CHANGE UP (ref 3.5–5.3)
PROT SERPL-MCNC: 7.1 G/DL — SIGNIFICANT CHANGE UP (ref 6–8.3)
PROT SERPL-MCNC: 7.1 G/DL — SIGNIFICANT CHANGE UP (ref 6–8.3)
RBC # BLD: 3.87 M/UL — SIGNIFICANT CHANGE UP (ref 3.8–5.2)
RBC # BLD: 3.87 M/UL — SIGNIFICANT CHANGE UP (ref 3.8–5.2)
RBC # FLD: 14.5 % — SIGNIFICANT CHANGE UP (ref 10.3–14.5)
RBC # FLD: 14.5 % — SIGNIFICANT CHANGE UP (ref 10.3–14.5)
SODIUM SERPL-SCNC: 135 MMOL/L — SIGNIFICANT CHANGE UP (ref 135–145)
SODIUM SERPL-SCNC: 135 MMOL/L — SIGNIFICANT CHANGE UP (ref 135–145)
SPECIMEN SOURCE: SIGNIFICANT CHANGE UP
SPECIMEN SOURCE: SIGNIFICANT CHANGE UP
WBC # BLD: 7.81 K/UL — SIGNIFICANT CHANGE UP (ref 3.8–10.5)
WBC # BLD: 7.81 K/UL — SIGNIFICANT CHANGE UP (ref 3.8–10.5)
WBC # FLD AUTO: 7.81 K/UL — SIGNIFICANT CHANGE UP (ref 3.8–10.5)
WBC # FLD AUTO: 7.81 K/UL — SIGNIFICANT CHANGE UP (ref 3.8–10.5)

## 2024-01-13 RX ORDER — ERTAPENEM SODIUM 1 G/1
1000 INJECTION, POWDER, LYOPHILIZED, FOR SOLUTION INTRAMUSCULAR; INTRAVENOUS EVERY 24 HOURS
Refills: 0 | Status: DISCONTINUED | OUTPATIENT
Start: 2024-01-14 | End: 2024-01-16

## 2024-01-13 RX ORDER — ERTAPENEM SODIUM 1 G/1
1000 INJECTION, POWDER, LYOPHILIZED, FOR SOLUTION INTRAMUSCULAR; INTRAVENOUS ONCE
Refills: 0 | Status: COMPLETED | OUTPATIENT
Start: 2024-01-13 | End: 2024-01-13

## 2024-01-13 RX ORDER — ERTAPENEM SODIUM 1 G/1
INJECTION, POWDER, LYOPHILIZED, FOR SOLUTION INTRAMUSCULAR; INTRAVENOUS
Refills: 0 | Status: DISCONTINUED | OUTPATIENT
Start: 2024-01-13 | End: 2024-01-16

## 2024-01-13 RX ADMIN — PANTOPRAZOLE SODIUM 40 MILLIGRAM(S): 20 TABLET, DELAYED RELEASE ORAL at 05:38

## 2024-01-13 RX ADMIN — Medication 1 TABLET(S): at 17:00

## 2024-01-13 RX ADMIN — ZOLPIDEM TARTRATE 5 MILLIGRAM(S): 10 TABLET ORAL at 21:16

## 2024-01-13 RX ADMIN — TRAMADOL HYDROCHLORIDE 50 MILLIGRAM(S): 50 TABLET ORAL at 21:16

## 2024-01-13 RX ADMIN — SODIUM CHLORIDE 70 MILLILITER(S): 9 INJECTION INTRAMUSCULAR; INTRAVENOUS; SUBCUTANEOUS at 21:17

## 2024-01-13 RX ADMIN — ZOLPIDEM TARTRATE 5 MILLIGRAM(S): 10 TABLET ORAL at 22:58

## 2024-01-13 RX ADMIN — SENNA PLUS 2 TABLET(S): 8.6 TABLET ORAL at 21:16

## 2024-01-13 RX ADMIN — AMLODIPINE BESYLATE 10 MILLIGRAM(S): 2.5 TABLET ORAL at 05:38

## 2024-01-13 RX ADMIN — ATORVASTATIN CALCIUM 10 MILLIGRAM(S): 80 TABLET, FILM COATED ORAL at 21:16

## 2024-01-13 RX ADMIN — TRAMADOL HYDROCHLORIDE 50 MILLIGRAM(S): 50 TABLET ORAL at 22:00

## 2024-01-13 RX ADMIN — ZOLPIDEM TARTRATE 5 MILLIGRAM(S): 10 TABLET ORAL at 01:22

## 2024-01-13 RX ADMIN — Medication 650 MILLIGRAM(S): at 23:15

## 2024-01-13 RX ADMIN — Medication 650 MILLIGRAM(S): at 22:44

## 2024-01-13 RX ADMIN — Medication 50 MICROGRAM(S): at 05:37

## 2024-01-13 RX ADMIN — ERTAPENEM SODIUM 120 MILLIGRAM(S): 1 INJECTION, POWDER, LYOPHILIZED, FOR SOLUTION INTRAMUSCULAR; INTRAVENOUS at 11:42

## 2024-01-13 RX ADMIN — Medication 1 TABLET(S): at 05:37

## 2024-01-13 RX ADMIN — GABAPENTIN 300 MILLIGRAM(S): 400 CAPSULE ORAL at 17:00

## 2024-01-13 RX ADMIN — ENOXAPARIN SODIUM 40 MILLIGRAM(S): 100 INJECTION SUBCUTANEOUS at 05:37

## 2024-01-13 RX ADMIN — MAGNESIUM HYDROXIDE 30 MILLILITER(S): 400 TABLET, CHEWABLE ORAL at 18:45

## 2024-01-13 RX ADMIN — ONDANSETRON 4 MILLIGRAM(S): 8 TABLET, FILM COATED ORAL at 12:54

## 2024-01-13 RX ADMIN — GABAPENTIN 300 MILLIGRAM(S): 400 CAPSULE ORAL at 05:39

## 2024-01-13 NOTE — CARE COORDINATION ASSESSMENT. - NSDCPLANSERVICES_GEN_ALL_CORE
This CM met with the pt and the  at the bedside. Introduced myself as the CM explained my role and left contact information. The pt verbalized understanding. The pt lives with her son and DIL and  in the son's private home. The pt is independent with ADL's prior to this admission. The pt ambulates independently without any devices. The pt has no HHA or services in the home and has a supportive . The PCP is Dr. Marques and the pharmacy is [facility] in [place]. Pt will be on LYUDMILA for bilat pyelo and will be DC home once cleared by Dr. Elias ID. No skilled needs anticipated. CM team to follow for post acute needs./No Anticipated Discharge Needs This CM met with the pt and the  at the bedside. Introduced myself as the CM explained my role and left contact information. The pt verbalized understanding. The pt lives with her son and DIL and  in the son's private home. The pt is independent with ADL's prior to this admission. The pt ambulates independently without any devices. The pt has no HHA or services in the home and has a supportive . The PCP is Dr. Marques and the pharmacy is [facility] in [place]. Pt will be on LYUDMILA for bilat pyelo and will be DC home once cleared by Dr. Elias ID. No skilled needs anticipated. CM team to follow for post acute needs./No Anticipated Discharge Needs

## 2024-01-13 NOTE — PROGRESS NOTE ADULT - SUBJECTIVE AND OBJECTIVE BOX
97.8 PROGRESS NOTE  Patient is a 73y old  Female who presents with a chief complaint of dysuria , fever and left flank pain (13 Jan 2024 11:15)  Chart and available morning labs /imaging are reviewed electronically , urgent issues addressed . More information  is being added upon completion of rounds , when more information is collected and management discussed with consultants , medical staff and social service/case management on the floor     OVERNIGHT  Fever last night  reported by medical staff . All above noted Patient is resting in a bed comfortably .Son is at bedside  .No distress noted   Spoke to [facility] -no final urine cx report yet , preliminary - 100,000 e.coli , d/w Dr Elias  HPI:  Patient is a 73-year-old female with past medical history of hypertension and hypothyroidism presents with abdominal pain for days.  Patient reports diffuse abdominal pain rating to the left flank with dysuria.  Patient reports associated fever 101 Tmax for the past week.  Patient went to urgent care 2 days ago and was diagnosed with a UTI and given Bactrim which patient took 3 doses.  Patient took Tylenol for fever last dose was at 6 PM today.  Patient denies chest pain, shortness of breath, cough, nausea, vomiting, diarrhea, rash. CT abd showed Bilateral pyelonephritis concomitant pyelitis and cystitis. No drainable   fluid collection. Started on iv abx , septic workup sent , ID cons requested  (12 Jan 2024 06:44)    PAST MEDICAL & SURGICAL HISTORY:  HLD (hyperlipidemia)      HTN (hypertension)      Hypothyroid      Insomnia      GERD (gastroesophageal reflux disease)      Neuropathy          MEDICATIONS  (STANDING):  amLODIPine   Tablet 10 milliGRAM(s) Oral daily  atorvastatin 10 milliGRAM(s) Oral at bedtime  enoxaparin Injectable 40 milliGRAM(s) SubCutaneous every 24 hours  ertapenem  IVPB      gabapentin 300 milliGRAM(s) Oral two times a day  lactobacillus acidophilus 1 Tablet(s) Oral every 12 hours  levothyroxine 50 MICROGram(s) Oral daily  pantoprazole    Tablet 40 milliGRAM(s) Oral before breakfast  propranolol 20 milliGRAM(s) Oral at bedtime  senna 2 Tablet(s) Oral at bedtime  sodium chloride 0.9%. 1000 milliLiter(s) (70 mL/Hr) IV Continuous <Continuous>    MEDICATIONS  (PRN):  acetaminophen     Tablet .. 650 milliGRAM(s) Oral every 6 hours PRN Temp greater or equal to 38C (100.4F), Mild Pain (1 - 3)  aluminum hydroxide/magnesium hydroxide/simethicone Suspension 30 milliLiter(s) Oral every 4 hours PRN Dyspepsia  hydrALAZINE 10 milliGRAM(s) Oral three times a day PRN Systolic blood pressure >160  magnesium hydroxide Suspension 30 milliLiter(s) Oral daily PRN Constipation  melatonin 3 milliGRAM(s) Oral at bedtime PRN Insomnia  ondansetron Injectable 4 milliGRAM(s) IV Push every 8 hours PRN Nausea and/or Vomiting  traMADol 50 milliGRAM(s) Oral three times a day PRN Moderate Pain (4 - 6)  zolpidem 5 milliGRAM(s) Oral at bedtime PRN Insomnia  zolpidem 5 milliGRAM(s) Oral at bedtime PRN Insomnia      OBJECTIVE    T(C): 36.9 (01-13-24 @ 05:48), Max: 38.8 (01-12-24 @ 20:33)  HR: 70 (01-13-24 @ 05:48) (69 - 104)  BP: 125/62 (01-13-24 @ 05:48) (125/62 - 135/77)  RR: 17 (01-13-24 @ 05:48) (16 - 17)  SpO2: 98% (01-13-24 @ 05:48) (97% - 98%)  Wt(kg): --  I&O's Summary    12 Jan 2024 07:01  -  13 Jan 2024 07:00  --------------------------------------------------------  IN: 450 mL / OUT: 0 mL / NET: 450 mL          REVIEW OF SYSTEMS:  CONSTITUTIONAL: s/p  fever, no weight loss, has  fatigue  EYES: No eye pain, visual disturbances, or discharge  ENMT:   No sinus or throat pain  NECK: No pain or stiffness  RESPIRATORY: No cough, wheezing, chills or hemoptysis; No shortness of breath  CARDIOVASCULAR: No chest pain, palpitations, dizziness, or leg swelling  GASTROINTESTINAL: No abdominal pain. No nausea, vomiting; No diarrhea or constipation. No melena or hematochezia.  GENITOURINARY: No dysuria, frequency, hematuria, or incontinence  NEUROLOGICAL: No headaches, memory loss, loss of strength, numbness, or tremors  SKIN: No itching, burning, rashes, or lesions   MUSCULOSKELETAL: No joint pain or swelling; No muscle, back, or extremity pain    PHYSICAL EXAM:  Appearance: NAD. VS past 24 hrs -as above   HEENT:   Moist oral mucosa. Conjunctiva clear b/l.   Neck : supple  Respiratory: Lungs CTAB.  Gastrointestinal:  Soft, nontender. No rebound. No rigidity. BS present	  Cardiovascular: RRR ,S1S2 present  Neurologic: Non-focal. Moving all extremities.  Extremities: No edema. No erythema. No calf tenderness.  Skin: No rashes, No ecchymoses, No cyanosis.	  wounds ,skin lesions-See skin assesment flow sheet   LABS:                        11.2   7.81  )-----------( 283      ( 13 Jan 2024 06:15 )             35.2     01-13    135  |  103  |  8   ----------------------------<  106<H>  3.8   |  23  |  0.80    Ca    9.0      13 Jan 2024 06:15    TPro  7.1  /  Alb  2.8<L>  /  TBili  0.4  /  DBili  x   /  AST  18  /  ALT  28  /  AlkPhos  139<H>  01-13    CAPILLARY BLOOD GLUCOSE        PT/INR - ( 12 Jan 2024 06:30 )   PT: 14.5 sec;   INR: 1.34 ratio         PTT - ( 11 Jan 2024 20:48 )  PTT:30.9 sec  Urinalysis Basic - ( 13 Jan 2024 06:15 )    Color: x / Appearance: x / SG: x / pH: x  Gluc: 106 mg/dL / Ketone: x  / Bili: x / Urobili: x   Blood: x / Protein: x / Nitrite: x   Leuk Esterase: x / RBC: x / WBC x   Sq Epi: x / Non Sq Epi: x / Bacteria: x        Culture - Blood (collected 11 Jan 2024 20:48)  Source: .Blood Blood-Peripheral  Preliminary Report (13 Jan 2024 01:03):    No growth at 24 hours    Culture - Blood (collected 11 Jan 2024 20:48)  Source: .Blood Blood-Peripheral  Gram Stain (13 Jan 2024 00:23):    Growth in aerobic bottle: Gram Negative Rods  Preliminary Report (13 Jan 2024 00:23):    Growth in aerobic bottle: Gram Negative Rods    Direct identification is available within approximately 3-5    hours either by Blood Panel Multiplexed PCR or Direct    MALDI-TOF. Details: [email or web address]  Organism: Blood Culture PCR (13 Jan 2024 02:01)  Organism: Blood Culture PCR (13 Jan 2024 02:01)      RADIOLOGY & ADDITIONAL TESTS:   reviewed elctronically  ASSESSMENT/PLAN: 	    25 minutes aggregate time was spent on this visit, 50% visit time spent in care co-ordination with other attendings and counselling patient .I have discussed care plan with patient / HCP/family member son at bedside  ,who expressed understanding of problems treatment and their effect and side effects, alternatives in details. I have asked if they have any questions and concerns and appropriately addressed them to best of my ability.  PROGRESS NOTE  Patient is a 73y old  Female who presents with a chief complaint of dysuria , fever and left flank pain (13 Jan 2024 11:15)  Chart and available morning labs /imaging are reviewed electronically , urgent issues addressed . More information  is being added upon completion of rounds , when more information is collected and management discussed with consultants , medical staff and social service/case management on the floor     OVERNIGHT  Fever last night  reported by medical staff . All above noted Patient is resting in a bed comfortably .Son is at bedside  .No distress noted   Spoke to [facility] -no final urine cx report yet , preliminary - 100,000 e.coli , d/w Dr Elias  HPI:  Patient is a 73-year-old female with past medical history of hypertension and hypothyroidism presents with abdominal pain for days.  Patient reports diffuse abdominal pain rating to the left flank with dysuria.  Patient reports associated fever 101 Tmax for the past week.  Patient went to urgent care 2 days ago and was diagnosed with a UTI and given Bactrim which patient took 3 doses.  Patient took Tylenol for fever last dose was at 6 PM today.  Patient denies chest pain, shortness of breath, cough, nausea, vomiting, diarrhea, rash. CT abd showed Bilateral pyelonephritis concomitant pyelitis and cystitis. No drainable   fluid collection. Started on iv abx , septic workup sent , ID cons requested  (12 Jan 2024 06:44)    PAST MEDICAL & SURGICAL HISTORY:  HLD (hyperlipidemia)      HTN (hypertension)      Hypothyroid      Insomnia      GERD (gastroesophageal reflux disease)      Neuropathy          MEDICATIONS  (STANDING):  amLODIPine   Tablet 10 milliGRAM(s) Oral daily  atorvastatin 10 milliGRAM(s) Oral at bedtime  enoxaparin Injectable 40 milliGRAM(s) SubCutaneous every 24 hours  ertapenem  IVPB      gabapentin 300 milliGRAM(s) Oral two times a day  lactobacillus acidophilus 1 Tablet(s) Oral every 12 hours  levothyroxine 50 MICROGram(s) Oral daily  pantoprazole    Tablet 40 milliGRAM(s) Oral before breakfast  propranolol 20 milliGRAM(s) Oral at bedtime  senna 2 Tablet(s) Oral at bedtime  sodium chloride 0.9%. 1000 milliLiter(s) (70 mL/Hr) IV Continuous <Continuous>    MEDICATIONS  (PRN):  acetaminophen     Tablet .. 650 milliGRAM(s) Oral every 6 hours PRN Temp greater or equal to 38C (100.4F), Mild Pain (1 - 3)  aluminum hydroxide/magnesium hydroxide/simethicone Suspension 30 milliLiter(s) Oral every 4 hours PRN Dyspepsia  hydrALAZINE 10 milliGRAM(s) Oral three times a day PRN Systolic blood pressure >160  magnesium hydroxide Suspension 30 milliLiter(s) Oral daily PRN Constipation  melatonin 3 milliGRAM(s) Oral at bedtime PRN Insomnia  ondansetron Injectable 4 milliGRAM(s) IV Push every 8 hours PRN Nausea and/or Vomiting  traMADol 50 milliGRAM(s) Oral three times a day PRN Moderate Pain (4 - 6)  zolpidem 5 milliGRAM(s) Oral at bedtime PRN Insomnia  zolpidem 5 milliGRAM(s) Oral at bedtime PRN Insomnia      OBJECTIVE    T(C): 36.9 (01-13-24 @ 05:48), Max: 38.8 (01-12-24 @ 20:33)  HR: 70 (01-13-24 @ 05:48) (69 - 104)  BP: 125/62 (01-13-24 @ 05:48) (125/62 - 135/77)  RR: 17 (01-13-24 @ 05:48) (16 - 17)  SpO2: 98% (01-13-24 @ 05:48) (97% - 98%)  Wt(kg): --  I&O's Summary    12 Jan 2024 07:01  -  13 Jan 2024 07:00  --------------------------------------------------------  IN: 450 mL / OUT: 0 mL / NET: 450 mL          REVIEW OF SYSTEMS:  CONSTITUTIONAL: s/p  fever, no weight loss, has  fatigue  EYES: No eye pain, visual disturbances, or discharge  ENMT:   No sinus or throat pain  NECK: No pain or stiffness  RESPIRATORY: No cough, wheezing, chills or hemoptysis; No shortness of breath  CARDIOVASCULAR: No chest pain, palpitations, dizziness, or leg swelling  GASTROINTESTINAL: No abdominal pain. No nausea, vomiting; No diarrhea or constipation. No melena or hematochezia.  GENITOURINARY: No dysuria, frequency, hematuria, or incontinence  NEUROLOGICAL: No headaches, memory loss, loss of strength, numbness, or tremors  SKIN: No itching, burning, rashes, or lesions   MUSCULOSKELETAL: No joint pain or swelling; No muscle, back, or extremity pain    PHYSICAL EXAM:  Appearance: NAD. VS past 24 hrs -as above   HEENT:   Moist oral mucosa. Conjunctiva clear b/l.   Neck : supple  Respiratory: Lungs CTAB.  Gastrointestinal:  Soft, nontender. No rebound. No rigidity. BS present	  Cardiovascular: RRR ,S1S2 present  Neurologic: Non-focal. Moving all extremities.  Extremities: No edema. No erythema. No calf tenderness.  Skin: No rashes, No ecchymoses, No cyanosis.	  wounds ,skin lesions-See skin assesment flow sheet   LABS:                        11.2   7.81  )-----------( 283      ( 13 Jan 2024 06:15 )             35.2     01-13    135  |  103  |  8   ----------------------------<  106<H>  3.8   |  23  |  0.80    Ca    9.0      13 Jan 2024 06:15    TPro  7.1  /  Alb  2.8<L>  /  TBili  0.4  /  DBili  x   /  AST  18  /  ALT  28  /  AlkPhos  139<H>  01-13    CAPILLARY BLOOD GLUCOSE        PT/INR - ( 12 Jan 2024 06:30 )   PT: 14.5 sec;   INR: 1.34 ratio         PTT - ( 11 Jan 2024 20:48 )  PTT:30.9 sec  Urinalysis Basic - ( 13 Jan 2024 06:15 )    Color: x / Appearance: x / SG: x / pH: x  Gluc: 106 mg/dL / Ketone: x  / Bili: x / Urobili: x   Blood: x / Protein: x / Nitrite: x   Leuk Esterase: x / RBC: x / WBC x   Sq Epi: x / Non Sq Epi: x / Bacteria: x        Culture - Blood (collected 11 Jan 2024 20:48)  Source: .Blood Blood-Peripheral  Preliminary Report (13 Jan 2024 01:03):    No growth at 24 hours    Culture - Blood (collected 11 Jan 2024 20:48)  Source: .Blood Blood-Peripheral  Gram Stain (13 Jan 2024 00:23):    Growth in aerobic bottle: Gram Negative Rods  Preliminary Report (13 Jan 2024 00:23):    Growth in aerobic bottle: Gram Negative Rods    Direct identification is available within approximately 3-5    hours either by Blood Panel Multiplexed PCR or Direct    MALDI-TOF. Details: [email or web address]  Organism: Blood Culture PCR (13 Jan 2024 02:01)  Organism: Blood Culture PCR (13 Jan 2024 02:01)      RADIOLOGY & ADDITIONAL TESTS:   reviewed elctronically  ASSESSMENT/PLAN: 	    25 minutes aggregate time was spent on this visit, 50% visit time spent in care co-ordination with other attendings and counselling patient .I have discussed care plan with patient / HCP/family member son at bedside  ,who expressed understanding of problems treatment and their effect and side effects, alternatives in details. I have asked if they have any questions and concerns and appropriately addressed them to best of my ability.

## 2024-01-13 NOTE — CHART NOTE - NSCHARTNOTEFT_GEN_A_CORE
Blood culture reported with G negative rods. Pt on ceftriaxone already for Pyelo. will continue for now.

## 2024-01-13 NOTE — CARE COORDINATION ASSESSMENT. - NSCAREPROVIDERS_GEN_ALL_CORE_FT
CARE PROVIDERS:  Accepting Physician: Silvia Carrington  Administration: Timothy Conti  Administration: Rei Layton  Admitting: Silvia Carrington  Attending: Silvia Carrington  Case Management: Linda Khan  Consultant: Eleazar Delaney  Consultant: James Elias  Consultant: Kim Mac  ED ACP: Shanita Moore  ED Attending: Josey Cevallos  ED Nurse: Rahat Brady  Emergency Medicine: Jennifer Lim  Nurse: Dane Desai  Ordered: Xavier SCMMLM  Override: Quin Qiu  PCA/Nursing Assistant: Jacque Jacques  PCA/Nursing Assistant: Renzo Sanchez  PCA/Nursing Assistant: Celina Ojeda  Physical Therapy: Renee Fraser  Primary Team: Jamie Hernandez  UR// Supp. Assoc.: Eliza Bejarano

## 2024-01-13 NOTE — PROGRESS NOTE ADULT - SUBJECTIVE AND OBJECTIVE BOX
CHIEF COMPLAINT/ REASON FOR VISIT  .. Patient was seen to address the  issue listed under PROBLEM LIST which is located toward bottom of this note     PACHECO GASCA    SY 1EST [birth year] D1    Allergies    No Known Allergies    Intolerances        PAST MEDICAL & SURGICAL HISTORY:  HTN (hypertension)      Hypothyroid      Insomnia      GERD (gastroesophageal reflux disease)      HLD (hyperlipidemia)      Neuropathy          FAMILY HISTORY:      Home Medications:  Ambien 10 mg oral tablet: 1 tab(s) orally once a day (at bedtime) (11 Jan 2024 23:07)  amLODIPine 10 mg oral tablet: 1 tab(s) orally once a day (11 Jan 2024 23:07)  atorvastatin 10 mg oral tablet: 1 tab(s) orally once a day (at bedtime) (11 Jan 2024 23:07)  gabapentin 300 mg oral capsule: 1 cap(s) orally 2 times a day (11 Jan 2024 23:07)  levothyroxine 50 mcg (0.05 mg) oral tablet: 1 tab(s) orally once a day (11 Jan 2024 23:07)  pantoprazole 20 mg oral delayed release tablet: 1 tab(s) orally once a day (at bedtime) (11 Jan 2024 23:07)  propranolol 20 mg oral tablet: 1 tab(s) orally once a day (at bedtime) (11 Jan 2024 23:07)      MEDICATIONS  (STANDING):  amLODIPine   Tablet 10 milliGRAM(s) Oral daily  atorvastatin 10 milliGRAM(s) Oral at bedtime  cefTRIAXone   IVPB 1000 milliGRAM(s) IV Intermittent every 24 hours  enoxaparin Injectable 40 milliGRAM(s) SubCutaneous every 24 hours  gabapentin 300 milliGRAM(s) Oral two times a day  lactobacillus acidophilus 1 Tablet(s) Oral every 12 hours  levothyroxine 50 MICROGram(s) Oral daily  pantoprazole    Tablet 40 milliGRAM(s) Oral before breakfast  propranolol 20 milliGRAM(s) Oral at bedtime  senna 2 Tablet(s) Oral at bedtime  sodium chloride 0.9%. 1000 milliLiter(s) (70 mL/Hr) IV Continuous <Continuous>    MEDICATIONS  (PRN):  acetaminophen     Tablet .. 650 milliGRAM(s) Oral every 6 hours PRN Temp greater or equal to 38C (100.4F), Mild Pain (1 - 3)  aluminum hydroxide/magnesium hydroxide/simethicone Suspension 30 milliLiter(s) Oral every 4 hours PRN Dyspepsia  hydrALAZINE 10 milliGRAM(s) Oral three times a day PRN Systolic blood pressure >160  magnesium hydroxide Suspension 30 milliLiter(s) Oral daily PRN Constipation  melatonin 3 milliGRAM(s) Oral at bedtime PRN Insomnia  ondansetron Injectable 4 milliGRAM(s) IV Push every 8 hours PRN Nausea and/or Vomiting  traMADol 50 milliGRAM(s) Oral three times a day PRN Moderate Pain (4 - 6)  zolpidem 5 milliGRAM(s) Oral at bedtime PRN Insomnia  zolpidem 5 milliGRAM(s) Oral at bedtime PRN Insomnia      Diet, DASH/TLC:   Sodium & Cholesterol Restricted (01-11-24 @ 23:45) [Active]          Vital Signs Last 24 Hrs  T(C): 36.9 (13 Jan 2024 05:48), Max: 38.8 (12 Jan 2024 20:33)  T(F): 98.4 (13 Jan 2024 05:48), Max: 101.8 (12 Jan 2024 20:33)  HR: 70 (13 Jan 2024 05:48) (69 - 104)  BP: 125/62 (13 Jan 2024 05:48) (109/69 - 135/77)  BP(mean): --  RR: 17 (13 Jan 2024 05:48) (16 - 17)  SpO2: 98% (13 Jan 2024 05:48) (95% - 98%)    Parameters below as of 13 Jan 2024 05:48  Patient On (Oxygen Delivery Method): room air          01-12-24 @ 07:01  -  01-13-24 @ 07:00  --------------------------------------------------------  IN: 450 mL / OUT: 0 mL / NET: 450 mL              LABS:                        11.2   7.81  )-----------( 283      ( 13 Jan 2024 06:15 )             35.2     01-13    135  |  103  |  8   ----------------------------<  106<H>  3.8   |  23  |  0.80    Ca    9.0      13 Jan 2024 06:15    TPro  7.1  /  Alb  2.8<L>  /  TBili  0.4  /  DBili  x   /  AST  18  /  ALT  28  /  AlkPhos  139<H>  01-13    PT/INR - ( 12 Jan 2024 06:30 )   PT: 14.5 sec;   INR: 1.34 ratio         PTT - ( 11 Jan 2024 20:48 )  PTT:30.9 sec  Urinalysis Basic - ( 13 Jan 2024 06:15 )    Color: x / Appearance: x / SG: x / pH: x  Gluc: 106 mg/dL / Ketone: x  / Bili: x / Urobili: x   Blood: x / Protein: x / Nitrite: x   Leuk Esterase: x / RBC: x / WBC x   Sq Epi: x / Non Sq Epi: x / Bacteria: x            WBC:  WBC Count: 7.81 K/uL (01-13 @ 06:15)  WBC Count: 10.04 K/uL (01-12 @ 06:30)  WBC Count: 11.60 K/uL (01-11 @ 20:48)      MICROBIOLOGY:  RECENT CULTURES:  01-11 .Blood Blood-Peripheral Blood Culture PCR   Growth in aerobic bottle: Gram Negative Rods   No growth at 24 hours                PT/INR - ( 12 Jan 2024 06:30 )   PT: 14.5 sec;   INR: 1.34 ratio         PTT - ( 11 Jan 2024 20:48 )  PTT:30.9 sec    Sodium:  Sodium: 135 mmol/L (01-13 @ 06:15)  Sodium: 136 mmol/L (01-12 @ 06:30)  Sodium: 134 mmol/L (01-11 @ 20:48)      0.80 mg/dL 01-13 @ 06:15  0.77 mg/dL 01-12 @ 06:30  0.96 mg/dL 01-11 @ 20:48      Hemoglobin:  Hemoglobin: 11.2 g/dL (01-13 @ 06:15)  Hemoglobin: 11.3 g/dL (01-12 @ 06:30)  Hemoglobin: 12.5 g/dL (01-11 @ 20:48)      Platelets: Platelet Count - Automated: 283 K/uL (01-13 @ 06:15)  Platelet Count - Automated: 257 K/uL (01-12 @ 06:30)  Platelet Count - Automated: 290 K/uL (01-11 @ 20:48)      LIVER FUNCTIONS - ( 13 Jan 2024 06:15 )  Alb: 2.8 g/dL / Pro: 7.1 g/dL / ALK PHOS: 139 U/L / ALT: 28 U/L / AST: 18 U/L / GGT: x             Urinalysis Basic - ( 13 Jan 2024 06:15 )    Color: x / Appearance: x / SG: x / pH: x  Gluc: 106 mg/dL / Ketone: x  / Bili: x / Urobili: x   Blood: x / Protein: x / Nitrite: x   Leuk Esterase: x / RBC: x / WBC x   Sq Epi: x / Non Sq Epi: x / Bacteria: x        RADIOLOGY & ADDITIONAL STUDIES:      MICROBIOLOGY:  RECENT CULTURES:  01-11 .Blood Blood-Peripheral Blood Culture PCR   Growth in aerobic bottle: Gram Negative Rods   No growth at 24 hours

## 2024-01-13 NOTE — CARE COORDINATION ASSESSMENT. - NSPASTMEDSURGHISTORY_GEN_ALL_CORE_FT
PAST MEDICAL & SURGICAL HISTORY:  Neuropathy      HLD (hyperlipidemia)      GERD (gastroesophageal reflux disease)      Insomnia      Hypothyroid      HTN (hypertension)

## 2024-01-13 NOTE — PROGRESS NOTE ADULT - SUBJECTIVE AND OBJECTIVE BOX
Covering [facility] DIVISION of INFECTIOUS DISEASE  LAY Kearns, ARNULFO Latham G. Casimir GULATI, HARVINDER is a 73yFemale , patient examined and chart reviewed.     INTERVAL HPI/ OVERNIGHT EVENTS:   Had shaking chills earlier. Tmax 101.8 last night.  Son at bedside.    PAST MEDICAL & SURGICAL HISTORY:  HTN (hypertension)  Hypothyroid  Insomnia  GERD (gastroesophageal reflux disease)  HLD (hyperlipidemia)  Neuropathy    For details regarding the patient's social history, family history, and other miscellaneous elements, please refer the initial infectious diseases consultation and/or the admitting history and physical examination for this admission.      ROS:  CONSTITUTIONAL:  Negative fever + chills  EYES:  Negative  blurry vision or double vision  CARDIOVASCULAR:  Negative for chest pain or palpitations  RESPIRATORY:  Negative for cough, wheezing, or SOB   GASTROINTESTINAL:  Negative for nausea, vomiting, diarrhea, constipation, or abdominal pain  GENITOURINARY:  Negative frequency, urgency or dysuria + flank pain  NEUROLOGIC:  No headache, confusion, dizziness, lightheadedness  All other systems were reviewed and are negative     No Known Allergies      Current inpatient medications :    ANTIBIOTICS/RELEVANT:  cefTRIAXone   IVPB 1000 milliGRAM(s) IV Intermittent every 24 hours   lactobacillus acidophilus 1 Tablet(s) Oral every 12 hours      acetaminophen     Tablet .. 650 milliGRAM(s) Oral every 6 hours PRN  aluminum hydroxide/magnesium hydroxide/simethicone Suspension 30 milliLiter(s) Oral every 4 hours PRN  amLODIPine   Tablet 10 milliGRAM(s) Oral daily  atorvastatin 10 milliGRAM(s) Oral at bedtime  enoxaparin Injectable 40 milliGRAM(s) SubCutaneous every 24 hours  gabapentin 300 milliGRAM(s) Oral two times a day  hydrALAZINE 10 milliGRAM(s) Oral three times a day PRN  levothyroxine 50 MICROGram(s) Oral daily  magnesium hydroxide Suspension 30 milliLiter(s) Oral daily PRN  melatonin 3 milliGRAM(s) Oral at bedtime PRN  ondansetron Injectable 4 milliGRAM(s) IV Push every 8 hours PRN  pantoprazole    Tablet 40 milliGRAM(s) Oral before breakfast  propranolol 20 milliGRAM(s) Oral at bedtime  senna 2 Tablet(s) Oral at bedtime  sodium chloride 0.9%. 1000 milliLiter(s) IV Continuous <Continuous>  traMADol 50 milliGRAM(s) Oral three times a day PRN  zolpidem 5 milliGRAM(s) Oral at bedtime PRN  zolpidem 5 milliGRAM(s) Oral at bedtime PRN      Objective:    01-12 @ 07:01  -  01-13 @ 07:00  --------------------------------------------------------  IN: 450 mL / OUT: 0 mL / NET: 450 mL      T(C): 36.9 (01-13-24 @ 05:48), Max: 38.8 (01-12-24 @ 20:33)  HR: 70 (01-13-24 @ 05:48) (69 - 104)  BP: 125/62 (01-13-24 @ 05:48) (125/62 - 135/77)  RR: 17 (01-13-24 @ 05:48) (16 - 17)  SpO2: 98% (01-13-24 @ 05:48) (97% - 98%)      Physical Exam:  General: no acute distress  Neck: supple, trachea midline  Lungs: clear, no wheeze/rhonchi  Cardiovascular: regular rate and rhythm, S1 S2  Abdomen: soft, nontender,  bowel sounds normal  Neurological: alert and oriented x3  Skin: no rash  Extremities: no edema      LABS:                        11.2   7.81  )-----------( 283      ( 13 Jan 2024 06:15 )             35.2       01-13    135  |  103  |  8   ----------------------------<  106<H>  3.8   |  23  |  0.80    Ca    9.0      13 Jan 2024 06:15    TPro  7.1  /  Alb  2.8<L>  /  TBili  0.4  /  DBili  x   /  AST  18  /  ALT  28  /  AlkPhos  139<H>  01-13      PT/INR - ( 12 Jan 2024 06:30 )   PT: 14.5 sec;   INR: 1.34 ratio         PTT - ( 11 Jan 2024 20:48 )  PTT:30.9 sec    MICROBIOLOGY:  Culture - Blood (collected 11 Jan 2024 20:48)  Source: .Blood Blood-Peripheral  Preliminary Report (13 Jan 2024 01:03):    No growth at 24 hours    Culture - Blood (collected 11 Jan 2024 20:48)  Source: .Blood Blood-Peripheral  Gram Stain (13 Jan 2024 00:23):    Growth in aerobic bottle: Gram Negative Rods  Preliminary Report (13 Jan 2024 00:23):    Growth in aerobic bottle: Gram Negative Rods    Direct identification is available within approximately 3-5    hours either by Blood Panel Multiplexed PCR or Direct    MALDI-TOF. Details: [email or web address]  Organism: Blood Culture PCR (13 Jan 2024 02:01)  Organism: Blood Culture PCR (13 Jan 2024 02:01)      Method Type: PCR      -  Escherichia coli: Detec      -  ESBL: Detec      -  CTX-M Resistance Marker: Detec    RADIOLOGY & ADDITIONAL STUDIES:  < from: CT Abdomen and Pelvis w/ IV Cont (01.11.24 @ 21:27) >    ACC: [id number] EXAM:  CT ABDOMEN AND PELVIS IC   ORDERED BY: NAFISA DALE     PROCEDURE DATE:  01/11/2024          INTERPRETATION:  CLINICAL INFORMATION: Abdominal pain    COMPARISON: None.    CONTRAST/COMPLICATIONS:  IV Contrast: Omnipaque 350  90 cc administered   10 cc discarded  Oral Contrast: NONE  Complications: None reported at time of study completion    PROCEDURE:  CT of the Abdomen and Pelvis was performed.  Sagittal and coronal reformats were performed.    FINDINGS:  LOWER CHEST: Within normal limits.    LIVER: Within normal limits.  BILE DUCTS: Normal caliber.  GALLBLADDER: Within normal limits.  SPLEEN: Within normal limits.  PANCREAS: Within normal limits.  ADRENALS: Within normal limits.  KIDNEYS/URETERS: Heterogeneous enhancement of bilateral kidneys.   Bilateral urothelial enhancement. No drainable fluid collections. No   hydronephrosis.    BLADDER: Circumferential bladder wall thickening and ossicular   infiltration..  REPRODUCTIVE ORGANS: Uterus and adnexa within normal limits.    BOWEL: No bowel obstruction. Appendix is normal.  PERITONEUM: No ascites.  VESSELS: Within normal limits.  RETROPERITONEUM/LYMPH NODES: No lymphadenopathy.  ABDOMINAL WALL: Within normal limits.  BONES: Degenerative changes. Straightening of the lumbar lordosis.    IMPRESSION:  Bilateral pyelonephritis concomitant pyelitis and cystitis. No drainable   fluid collection.    Assessment :  72YO F PMH hypertension and hypothyroidism admitted with Ecoli ESBL bactreremia sec Acute Pyelonephritis  CT abd showed Bilateral pyelonephritis concomitant pyelitis and cystitis.   Febrile      Plan :   Change antibiotic to Ertapenem  Fu cultures  Repeat blood cultures  Trend temps and cbc  Pulm toileting    D/w Dr Carrington      Continue with present regiment.  Appropriate use of antibiotics and adverse effects reviewed.      I have discussed the above plan of care with patient's son and daughter in law. They expressed understanding of the  treatment plan . Risks, benefits and alternatives discussed in detail. I have asked if they have any questions or concerns and appropriately addressed them to the best of my ability .    > 35 minutes were spent in direct patient care reviewing notes, medications ,labs data/ imaging , discussion with multidisciplinary team.    Thank you for allowing me to participate in care of your patient .    James Elias MD  Infectious Disease  [phone] Covering [facility] DIVISION of INFECTIOUS DISEASE  LAY Kearns, ARNULFO Latham G. Casimir GULATI, HARVINDER is a 73yFemale , patient examined and chart reviewed.     INTERVAL HPI/ OVERNIGHT EVENTS:   Had shaking chills earlier. Tmax 101.8 last night.  Son at bedside.    PAST MEDICAL & SURGICAL HISTORY:  HTN (hypertension)  Hypothyroid  Insomnia  GERD (gastroesophageal reflux disease)  HLD (hyperlipidemia)  Neuropathy    For details regarding the patient's social history, family history, and other miscellaneous elements, please refer the initial infectious diseases consultation and/or the admitting history and physical examination for this admission.      ROS:  CONSTITUTIONAL:  Negative fever + chills  EYES:  Negative  blurry vision or double vision  CARDIOVASCULAR:  Negative for chest pain or palpitations  RESPIRATORY:  Negative for cough, wheezing, or SOB   GASTROINTESTINAL:  Negative for nausea, vomiting, diarrhea, constipation, or abdominal pain  GENITOURINARY:  Negative frequency, urgency or dysuria + flank pain  NEUROLOGIC:  No headache, confusion, dizziness, lightheadedness  All other systems were reviewed and are negative     No Known Allergies      Current inpatient medications :    ANTIBIOTICS/RELEVANT:  cefTRIAXone   IVPB 1000 milliGRAM(s) IV Intermittent every 24 hours   lactobacillus acidophilus 1 Tablet(s) Oral every 12 hours      acetaminophen     Tablet .. 650 milliGRAM(s) Oral every 6 hours PRN  aluminum hydroxide/magnesium hydroxide/simethicone Suspension 30 milliLiter(s) Oral every 4 hours PRN  amLODIPine   Tablet 10 milliGRAM(s) Oral daily  atorvastatin 10 milliGRAM(s) Oral at bedtime  enoxaparin Injectable 40 milliGRAM(s) SubCutaneous every 24 hours  gabapentin 300 milliGRAM(s) Oral two times a day  hydrALAZINE 10 milliGRAM(s) Oral three times a day PRN  levothyroxine 50 MICROGram(s) Oral daily  magnesium hydroxide Suspension 30 milliLiter(s) Oral daily PRN  melatonin 3 milliGRAM(s) Oral at bedtime PRN  ondansetron Injectable 4 milliGRAM(s) IV Push every 8 hours PRN  pantoprazole    Tablet 40 milliGRAM(s) Oral before breakfast  propranolol 20 milliGRAM(s) Oral at bedtime  senna 2 Tablet(s) Oral at bedtime  sodium chloride 0.9%. 1000 milliLiter(s) IV Continuous <Continuous>  traMADol 50 milliGRAM(s) Oral three times a day PRN  zolpidem 5 milliGRAM(s) Oral at bedtime PRN  zolpidem 5 milliGRAM(s) Oral at bedtime PRN      Objective:    01-12 @ 07:01  -  01-13 @ 07:00  --------------------------------------------------------  IN: 450 mL / OUT: 0 mL / NET: 450 mL      T(C): 36.9 (01-13-24 @ 05:48), Max: 38.8 (01-12-24 @ 20:33)  HR: 70 (01-13-24 @ 05:48) (69 - 104)  BP: 125/62 (01-13-24 @ 05:48) (125/62 - 135/77)  RR: 17 (01-13-24 @ 05:48) (16 - 17)  SpO2: 98% (01-13-24 @ 05:48) (97% - 98%)      Physical Exam:  General: no acute distress  Neck: supple, trachea midline  Lungs: clear, no wheeze/rhonchi  Cardiovascular: regular rate and rhythm, S1 S2  Abdomen: soft, nontender,  bowel sounds normal  Neurological: alert and oriented x3  Skin: no rash  Extremities: no edema      LABS:                        11.2   7.81  )-----------( 283      ( 13 Jan 2024 06:15 )             35.2       01-13    135  |  103  |  8   ----------------------------<  106<H>  3.8   |  23  |  0.80    Ca    9.0      13 Jan 2024 06:15    TPro  7.1  /  Alb  2.8<L>  /  TBili  0.4  /  DBili  x   /  AST  18  /  ALT  28  /  AlkPhos  139<H>  01-13      PT/INR - ( 12 Jan 2024 06:30 )   PT: 14.5 sec;   INR: 1.34 ratio         PTT - ( 11 Jan 2024 20:48 )  PTT:30.9 sec    MICROBIOLOGY:  Culture - Blood (collected 11 Jan 2024 20:48)  Source: .Blood Blood-Peripheral  Preliminary Report (13 Jan 2024 01:03):    No growth at 24 hours    Culture - Blood (collected 11 Jan 2024 20:48)  Source: .Blood Blood-Peripheral  Gram Stain (13 Jan 2024 00:23):    Growth in aerobic bottle: Gram Negative Rods  Preliminary Report (13 Jan 2024 00:23):    Growth in aerobic bottle: Gram Negative Rods    Direct identification is available within approximately 3-5    hours either by Blood Panel Multiplexed PCR or Direct    MALDI-TOF. Details: [email or web address]  Organism: Blood Culture PCR (13 Jan 2024 02:01)  Organism: Blood Culture PCR (13 Jan 2024 02:01)      Method Type: PCR      -  Escherichia coli: Detec      -  ESBL: Detec      -  CTX-M Resistance Marker: Detec    RADIOLOGY & ADDITIONAL STUDIES:  < from: CT Abdomen and Pelvis w/ IV Cont (01.11.24 @ 21:27) >    ACC: [id number] EXAM:  CT ABDOMEN AND PELVIS IC   ORDERED BY: NAFISA DALE     PROCEDURE DATE:  01/11/2024          INTERPRETATION:  CLINICAL INFORMATION: Abdominal pain    COMPARISON: None.    CONTRAST/COMPLICATIONS:  IV Contrast: Omnipaque 350  90 cc administered   10 cc discarded  Oral Contrast: NONE  Complications: None reported at time of study completion    PROCEDURE:  CT of the Abdomen and Pelvis was performed.  Sagittal and coronal reformats were performed.    FINDINGS:  LOWER CHEST: Within normal limits.    LIVER: Within normal limits.  BILE DUCTS: Normal caliber.  GALLBLADDER: Within normal limits.  SPLEEN: Within normal limits.  PANCREAS: Within normal limits.  ADRENALS: Within normal limits.  KIDNEYS/URETERS: Heterogeneous enhancement of bilateral kidneys.   Bilateral urothelial enhancement. No drainable fluid collections. No   hydronephrosis.    BLADDER: Circumferential bladder wall thickening and ossicular   infiltration..  REPRODUCTIVE ORGANS: Uterus and adnexa within normal limits.    BOWEL: No bowel obstruction. Appendix is normal.  PERITONEUM: No ascites.  VESSELS: Within normal limits.  RETROPERITONEUM/LYMPH NODES: No lymphadenopathy.  ABDOMINAL WALL: Within normal limits.  BONES: Degenerative changes. Straightening of the lumbar lordosis.    IMPRESSION:  Bilateral pyelonephritis concomitant pyelitis and cystitis. No drainable   fluid collection.    Assessment :  74YO F PMH hypertension and hypothyroidism admitted with Ecoli ESBL bactreremia sec Acute Pyelonephritis  CT abd showed Bilateral pyelonephritis concomitant pyelitis and cystitis.   Febrile      Plan :   Change antibiotic to Ertapenem  Fu cultures  Repeat blood cultures  Trend temps and cbc  Pulm toileting    D/w Dr Carrington      Continue with present regiment.  Appropriate use of antibiotics and adverse effects reviewed.      I have discussed the above plan of care with patient's son and daughter in law. They expressed understanding of the  treatment plan . Risks, benefits and alternatives discussed in detail. I have asked if they have any questions or concerns and appropriately addressed them to the best of my ability .    > 35 minutes were spent in direct patient care reviewing notes, medications ,labs data/ imaging , discussion with multidisciplinary team.    Thank you for allowing me to participate in care of your patient .    James Elias MD  Infectious Disease  [phone]

## 2024-01-13 NOTE — PROGRESS NOTE ADULT - ASSESSMENT
REASON FOR VISIT  .. Management of problems listed below        REVIEW OF SYMPTOMS   Able to give ROS  Yes     RELIABILITY +/-   CONSTITUTIONAL Weakness Yes    ENDOCRINE  No heat or cold intolerance    ALLERGY No hives  Sore throat No stridor  RESP Shortness of breath YES   NEURO New weakness No   CARDIAC   Palpitations No         PHYSICAL EXAM    HEENT Unremarkable  atraumatic   RESP Fair air entry  Harsh breath sound   CARDIAC S1 S2 No S3     NO JVD    ABDOMEN No hepatosplenomegaly   PEDAL EDEMA present No calf tenderness  NO rash       GENERAL DATA .   GOC.  ..  1/12/2024 full code   ICU STAY.  .. no   COVID. .. 1/11 (-)       BEST PRACTICE ISSUES.    HOB ELEVATN.  .. Yes  DVT PPLX.  ..   1/11 lvnx 40         DIET.   ..  1/11 dash   IV fl. .. 1/11 ns 70    ALLGY. ..     nka  WT. ..   1/11/2024 83  BMI. ..   1/11/2024 31    ABGS.   .  VS/ PO/IO/ VENT/ DRIPS.   1/13/2024 afeb 70 120/60   1/13/2024 ra 98%     PRESENTATION.  . CC 1/11/2024.pain abdomen uti   . HPI 1/11/2024.  . 73 f with pain abdomen several days diffuse pain more l flank with dysuria and fever 101   . Pt was given bactrim for uti at urgent care on 1/9   . PMHx . hytn hypothyroid   . ER MEDS .  . 1/11/2024 8p rocephin   . 1/11/2024 ns 1700     PROBLEM DATA.  INFECTION  . E COLI BACTEREMIA 1/11 ESBL (+)Ctx M (+)   . SEPSIS   . PYELONEPHRITIS   .. W 1/11-1/12-1/13/2024 w 11.6-10 - 7.8   .. pr 1/12/2024 pr .3   .. ua 1/11/2024 ua e 9 le mod n (+)   .. cxr 1/12/2024   .... susp rml infiltr/atelectasis   .. CTAP ic 1/11/2024   .... bl pyelonephritis concomitant pyelitis and cystitis   .. 1/11-1/13/2024 rocephin  .. 1/13/2024 ertapenem    . RML ATELECTASIS 1/11 CXR     . HEMODYNAMICS  .. la 1/11/2024 la 1   . 1/11/2024 BP ok    . ATELECTASIS   .. CT ch 1/12/2024   .... no consolidatn  .... subsegmental lingular and rml atelectasis     HEMAT  .. Hb 1/11-1/12-1/13/2024 Hb 12.5 - 11 - 11.2   .. inr 1/11/2024 inr 121     . HYPONATREMIA   .. Na 1/11-1/12-1/13/2024 na 134 - 136- 135  .. K 1/11/2024 K 4  .. Cr 1/11/2024 cr .9     . ELEVATED LFTS   .. ap 1/11-1/13/2024 ap 169 - 139  .. ast 1/11-1/13/2024 ast 30 - 18  .. alt 1/11-1/13/2024 alt 41 - 28   . .  .    . SUMMARY.     . 73 f PMH Hytn hypothyroid admitted 1/11/2024 with pain abdomen several days diffuse pain more l flank with dysuria and fever 101 CT cw bl pyelonephritis started on rocephin 1/11/2024   . Pulm Crit Care consulted 1/11/2024 for sepsis  . 1/11 bc grew E coli esbl ctx m (+) and escalated to ertapenem      . OVERALL PLAN.  . . E COLI BACTEREMIA 1/11 ESBL (+)Ctx M (+)    . Pyelonephritis  .. Rocephin    . RML ATELECTASIS 1/11 CXR   .. CT ch 1/12/2024   .... no consolidatn  .... subsegmental lingular and rml atelectasis   .. 1/13/2024 dw son bedside advised repeat ct 2 mGiven my office card     . Hyponatremia  .. IV NS   .. resolvd     . DISPO.  .. Rx . E COLI BACTEREMIA 1/11 ESBL (+)Ctx M (+)  pyelonephritis with ertapenem started 1/13/2024    TIME SPENT.  . Over 36 minutes aggregate care time spent on encounter; activities included   direct patient care, counseling and/or coordinating care reviewing notes, lab data/ imaging , discussion with multidisciplinary team/ patient  /family and explaining in detail risks, benefits, alternatives  of the recommendations     PATIENT.  . ELO BERGMAN 73 f 1/11/2024 1951 DR EDMOND GASCA

## 2024-01-13 NOTE — CARE COORDINATION ASSESSMENT. - PATIENT PROFILE REVIEW
[facility] DIVISION OF KIDNEY DISEASES AND HYPERTENSION -- FOLLOW UP NOTE  --------------------------------------------------------------------------------    24 hour events/subjective: urine output 35 cc/hr. Currently net 2L positive. Patient was seen and examined at bedside. Currently intubated. Unable to obtain ROS    PAST HISTORY  --------------------------------------------------------------------------------  No significant changes to PMH, PSH, FHx, SHx, unless otherwise noted    ALLERGIES & MEDICATIONS  --------------------------------------------------------------------------------  Allergies    No Known Allergies    Intolerances    Standing Inpatient Medications  artificial  tears Solution 1 Drop(s) Both EYES three times a day  chlorhexidine 4% Liquid 1 Application(s) Topical <User Schedule>  insulin lispro (ADMELOG) corrective regimen sliding scale   SubCutaneous every 6 hours  insulin NPH human recombinant 5 Unit(s) SubCutaneous every 6 hours  lactulose Syrup 30 Gram(s) Oral every 8 hours  levothyroxine Injectable 44 MICROGram(s) IV Push at bedtime  midodrine 20 milliGRAM(s) Oral every 8 hours  norepinephrine Infusion 0.03 MICROgram(s)/kG/Min IV Continuous <Continuous>  nystatin Powder 1 Application(s) Topical three times a day  pantoprazole  Injectable 40 milliGRAM(s) IV Push every 12 hours  petrolatum Ophthalmic Ointment 1 Application(s) Both EYES daily  piperacillin/tazobactam IVPB.. 3.375 Gram(s) IV Intermittent every 8 hours  rifAXIMin 550 milliGRAM(s) Oral two times a day  sucralfate suspension 1 Gram(s) Oral every 6 hours    PRN Inpatient Medications  albuterol/ipratropium for Nebulization 3 milliLiter(s) Nebulizer every 6 hours PRN    REVIEW OF SYSTEMS  --------------------------------------------------------------------------------  Unable to obtain ROS    VITALS/PHYSICAL EXAM  --------------------------------------------------------------------------------  T(C): 36.6 (06-07-21 @ 07:00), Max: 37.2 (06-06-21 @ 16:00)  HR: 69 (06-07-21 @ 08:52) (69 - 91)  BP: 104/53 (06-07-21 @ 08:45) (81/49 - 152/66)  RR: 18 (06-07-21 @ 08:45) (12 - 31)  SpO2: 100% (06-07-21 @ 08:52) (91% - 100%)  Wt(kg): --    06-06-21 @ 07:01  -  06-07-21 @ 07:00  --------------------------------------------------------  IN: 2064 mL / OUT: 35 mL / NET: 2029 mL    06-07-21 @ 07:01  -  06-07-21 @ 09:32  --------------------------------------------------------  IN: 100 mL / OUT: 0 mL / NET: 100 ml    Physical Exam:  	Gen: intubated  	HEENT: ETT  	Pulm: CTA B/L, no crackles   	CV: S1S2  	Abd: Soft, +BS   	Ext: No LE edema B/L  	Neuro: poor mental status   	Skin: Warm and dry    LABS/STUDIES  --------------------------------------------------------------------------------              8.2    5.01  >-----------<  83       [06-07-21 @ 00:31]              25.4     141  |  105  |  74  ----------------------------<  270      [06-07-21 @ 00:31]  4.0   |  18  |  3.47        Ca     9.2     [06-07-21 @ 00:31]    TPro  5.6  /  Alb  3.3  /  TBili  1.3  /  DBili  x   /  AST  23  /  ALT  15  /  AlkPhos  53  [06-07-21 @ 00:31]    PT/INR: PT 17.8 , INR 1.51       [06-07-21 @ 00:31]  PTT: 46.9       [06-07-21 @ 00:31]    Creatinine Trend:  SCr 3.47 [06-07 @ 00:31]  SCr 2.60 [06-06 @ 00:28]  SCr 1.88 [06-05 @ 00:54]  SCr 1.83 [06-04 @ 01:01]  SCr 1.64 [06-03 @ 00:11]    Urinalysis - [06-01-21 @ 18:55]      Color Light Yellow / Appearance Clear / SG 1.011 / pH 5.5      Gluc Negative / Ketone Negative  / Bili Negative / Urobili Negative       Blood Negative / Protein Negative / Leuk Est Negative / Nitrite Negative      RBC 1 / WBC 0 / Hyaline 6 / Gran  / Sq Epi  / Non Sq Epi 0 / Bacteria Negative      HbA1c 7.1      [01-12-19 @ 07:22]  TSH 1.00      [05-17-21 @ 08:41]    HBsAb 32.4      [05-17-21 @ 23:13]  HBsAg Nonreact      [05-17-21 @ 23:13]  HBcAb Reactive      [05-17-21 @ 23:13]  HCV 0.17, Nonreact      [05-17-21 @ 23:13]  HIV Nonreact      [05-18-21 @ 01:05]     Yes

## 2024-01-14 LAB
-  AMPICILLIN/SULBACTAM: SIGNIFICANT CHANGE UP
-  AMPICILLIN/SULBACTAM: SIGNIFICANT CHANGE UP
-  AMPICILLIN: SIGNIFICANT CHANGE UP
-  AMPICILLIN: SIGNIFICANT CHANGE UP
-  AZTREONAM: SIGNIFICANT CHANGE UP
-  AZTREONAM: SIGNIFICANT CHANGE UP
-  CEFAZOLIN: SIGNIFICANT CHANGE UP
-  CEFAZOLIN: SIGNIFICANT CHANGE UP
-  CEFEPIME: SIGNIFICANT CHANGE UP
-  CEFEPIME: SIGNIFICANT CHANGE UP
-  CEFTRIAXONE: SIGNIFICANT CHANGE UP
-  CEFTRIAXONE: SIGNIFICANT CHANGE UP
-  CIPROFLOXACIN: SIGNIFICANT CHANGE UP
-  CIPROFLOXACIN: SIGNIFICANT CHANGE UP
-  ERTAPENEM: SIGNIFICANT CHANGE UP
-  ERTAPENEM: SIGNIFICANT CHANGE UP
-  GENTAMICIN: SIGNIFICANT CHANGE UP
-  GENTAMICIN: SIGNIFICANT CHANGE UP
-  IMIPENEM: SIGNIFICANT CHANGE UP
-  IMIPENEM: SIGNIFICANT CHANGE UP
-  LEVOFLOXACIN: SIGNIFICANT CHANGE UP
-  LEVOFLOXACIN: SIGNIFICANT CHANGE UP
-  MEROPENEM: SIGNIFICANT CHANGE UP
-  MEROPENEM: SIGNIFICANT CHANGE UP
-  PIPERACILLIN/TAZOBACTAM: SIGNIFICANT CHANGE UP
-  PIPERACILLIN/TAZOBACTAM: SIGNIFICANT CHANGE UP
-  TOBRAMYCIN: SIGNIFICANT CHANGE UP
-  TOBRAMYCIN: SIGNIFICANT CHANGE UP
-  TRIMETHOPRIM/SULFAMETHOXAZOLE: SIGNIFICANT CHANGE UP
-  TRIMETHOPRIM/SULFAMETHOXAZOLE: SIGNIFICANT CHANGE UP
ANION GAP SERPL CALC-SCNC: 11 MMOL/L — SIGNIFICANT CHANGE UP (ref 5–17)
ANION GAP SERPL CALC-SCNC: 11 MMOL/L — SIGNIFICANT CHANGE UP (ref 5–17)
BUN SERPL-MCNC: 11 MG/DL — SIGNIFICANT CHANGE UP (ref 7–23)
BUN SERPL-MCNC: 11 MG/DL — SIGNIFICANT CHANGE UP (ref 7–23)
CALCIUM SERPL-MCNC: 9.1 MG/DL — SIGNIFICANT CHANGE UP (ref 8.4–10.5)
CALCIUM SERPL-MCNC: 9.1 MG/DL — SIGNIFICANT CHANGE UP (ref 8.4–10.5)
CHLORIDE SERPL-SCNC: 101 MMOL/L — SIGNIFICANT CHANGE UP (ref 96–108)
CHLORIDE SERPL-SCNC: 101 MMOL/L — SIGNIFICANT CHANGE UP (ref 96–108)
CO2 SERPL-SCNC: 25 MMOL/L — SIGNIFICANT CHANGE UP (ref 22–31)
CO2 SERPL-SCNC: 25 MMOL/L — SIGNIFICANT CHANGE UP (ref 22–31)
CREAT SERPL-MCNC: 0.78 MG/DL — SIGNIFICANT CHANGE UP (ref 0.5–1.3)
CREAT SERPL-MCNC: 0.78 MG/DL — SIGNIFICANT CHANGE UP (ref 0.5–1.3)
CULTURE RESULTS: ABNORMAL
CULTURE RESULTS: ABNORMAL
EGFR: 81 ML/MIN/1.73M2 — SIGNIFICANT CHANGE UP
EGFR: 81 ML/MIN/1.73M2 — SIGNIFICANT CHANGE UP
GLUCOSE SERPL-MCNC: 97 MG/DL — SIGNIFICANT CHANGE UP (ref 70–99)
GLUCOSE SERPL-MCNC: 97 MG/DL — SIGNIFICANT CHANGE UP (ref 70–99)
HCT VFR BLD CALC: 38.9 % — SIGNIFICANT CHANGE UP (ref 34.5–45)
HCT VFR BLD CALC: 38.9 % — SIGNIFICANT CHANGE UP (ref 34.5–45)
HGB BLD-MCNC: 12.6 G/DL — SIGNIFICANT CHANGE UP (ref 11.5–15.5)
HGB BLD-MCNC: 12.6 G/DL — SIGNIFICANT CHANGE UP (ref 11.5–15.5)
MCHC RBC-ENTMCNC: 29.2 PG — SIGNIFICANT CHANGE UP (ref 27–34)
MCHC RBC-ENTMCNC: 29.2 PG — SIGNIFICANT CHANGE UP (ref 27–34)
MCHC RBC-ENTMCNC: 32.4 GM/DL — SIGNIFICANT CHANGE UP (ref 32–36)
MCHC RBC-ENTMCNC: 32.4 GM/DL — SIGNIFICANT CHANGE UP (ref 32–36)
MCV RBC AUTO: 90 FL — SIGNIFICANT CHANGE UP (ref 80–100)
MCV RBC AUTO: 90 FL — SIGNIFICANT CHANGE UP (ref 80–100)
METHOD TYPE: SIGNIFICANT CHANGE UP
METHOD TYPE: SIGNIFICANT CHANGE UP
NRBC # BLD: 0 /100 WBCS — SIGNIFICANT CHANGE UP (ref 0–0)
NRBC # BLD: 0 /100 WBCS — SIGNIFICANT CHANGE UP (ref 0–0)
ORGANISM # SPEC MICROSCOPIC CNT: ABNORMAL
PLATELET # BLD AUTO: 326 K/UL — SIGNIFICANT CHANGE UP (ref 150–400)
PLATELET # BLD AUTO: 326 K/UL — SIGNIFICANT CHANGE UP (ref 150–400)
POTASSIUM SERPL-MCNC: 4.4 MMOL/L — SIGNIFICANT CHANGE UP (ref 3.5–5.3)
POTASSIUM SERPL-MCNC: 4.4 MMOL/L — SIGNIFICANT CHANGE UP (ref 3.5–5.3)
POTASSIUM SERPL-SCNC: 4.4 MMOL/L — SIGNIFICANT CHANGE UP (ref 3.5–5.3)
POTASSIUM SERPL-SCNC: 4.4 MMOL/L — SIGNIFICANT CHANGE UP (ref 3.5–5.3)
RBC # BLD: 4.32 M/UL — SIGNIFICANT CHANGE UP (ref 3.8–5.2)
RBC # BLD: 4.32 M/UL — SIGNIFICANT CHANGE UP (ref 3.8–5.2)
RBC # FLD: 14.4 % — SIGNIFICANT CHANGE UP (ref 10.3–14.5)
RBC # FLD: 14.4 % — SIGNIFICANT CHANGE UP (ref 10.3–14.5)
SODIUM SERPL-SCNC: 137 MMOL/L — SIGNIFICANT CHANGE UP (ref 135–145)
SODIUM SERPL-SCNC: 137 MMOL/L — SIGNIFICANT CHANGE UP (ref 135–145)
SPECIMEN SOURCE: SIGNIFICANT CHANGE UP
SPECIMEN SOURCE: SIGNIFICANT CHANGE UP
WBC # BLD: 5.96 K/UL — SIGNIFICANT CHANGE UP (ref 3.8–10.5)
WBC # BLD: 5.96 K/UL — SIGNIFICANT CHANGE UP (ref 3.8–10.5)
WBC # FLD AUTO: 5.96 K/UL — SIGNIFICANT CHANGE UP (ref 3.8–10.5)
WBC # FLD AUTO: 5.96 K/UL — SIGNIFICANT CHANGE UP (ref 3.8–10.5)

## 2024-01-14 RX ADMIN — GABAPENTIN 300 MILLIGRAM(S): 400 CAPSULE ORAL at 17:25

## 2024-01-14 RX ADMIN — Medication 30 MILLILITER(S): at 17:47

## 2024-01-14 RX ADMIN — ZOLPIDEM TARTRATE 5 MILLIGRAM(S): 10 TABLET ORAL at 22:06

## 2024-01-14 RX ADMIN — Medication 1 TABLET(S): at 17:24

## 2024-01-14 RX ADMIN — Medication 50 MICROGRAM(S): at 05:33

## 2024-01-14 RX ADMIN — Medication 1 TABLET(S): at 08:26

## 2024-01-14 RX ADMIN — Medication 3 MILLIGRAM(S): at 00:24

## 2024-01-14 RX ADMIN — Medication 650 MILLIGRAM(S): at 17:47

## 2024-01-14 RX ADMIN — GABAPENTIN 300 MILLIGRAM(S): 400 CAPSULE ORAL at 08:26

## 2024-01-14 RX ADMIN — ATORVASTATIN CALCIUM 10 MILLIGRAM(S): 80 TABLET, FILM COATED ORAL at 22:05

## 2024-01-14 RX ADMIN — SENNA PLUS 2 TABLET(S): 8.6 TABLET ORAL at 22:05

## 2024-01-14 RX ADMIN — ERTAPENEM SODIUM 120 MILLIGRAM(S): 1 INJECTION, POWDER, LYOPHILIZED, FOR SOLUTION INTRAMUSCULAR; INTRAVENOUS at 11:28

## 2024-01-14 RX ADMIN — Medication 650 MILLIGRAM(S): at 18:15

## 2024-01-14 RX ADMIN — ENOXAPARIN SODIUM 40 MILLIGRAM(S): 100 INJECTION SUBCUTANEOUS at 05:33

## 2024-01-14 RX ADMIN — PANTOPRAZOLE SODIUM 40 MILLIGRAM(S): 20 TABLET, DELAYED RELEASE ORAL at 05:34

## 2024-01-14 RX ADMIN — AMLODIPINE BESYLATE 10 MILLIGRAM(S): 2.5 TABLET ORAL at 08:26

## 2024-01-14 NOTE — PHYSICAL THERAPY INITIAL EVALUATION ADULT - PERTINENT HX OF CURRENT PROBLEM, REHAB EVAL
Patient is a 73-year-old female with past medical history of hypertension and hypothyroidism presents with abdominal pain for days.  Patient reports diffuse abdominal pain rating to the left flank with dysuria.  Patient reports associated fever 101 Tmax for the past week.  Patient went to urgent care 2 days ago and was diagnosed with a UTI and given Bactrim which patient took 3 doses.  Patient took Tylenol for fever last dose was at 6 PM today.  Patient denies chest pain, shortness of breath, cough, nausea, vomiting, diarrhea, rash. CT abd showed Bilateral pyelonephritis concomitant pyelitis and cystitis. No drainable

## 2024-01-14 NOTE — PROGRESS NOTE ADULT - ASSESSMENT
REASON FOR VISIT  .. Management of problems listed below        REVIEW OF SYMPTOMS   Able to give ROS  Yes     RELIABILITY +/-   CONSTITUTIONAL Weakness Yes    ENDOCRINE  No heat or cold intolerance    ALLERGY No hives  Sore throat No stridor  RESP Shortness of breath YES   NEURO New weakness No   CARDIAC   Palpitations No         PHYSICAL EXAM    HEENT Unremarkable  atraumatic   RESP Fair air entry  Harsh breath sound   CARDIAC S1 S2 No S3     NO JVD    ABDOMEN No hepatosplenomegaly   PEDAL EDEMA present No calf tenderness  NO rash       GENERAL DATA .   GOC.  ..  1/12/2024 full code   ICU STAY.  .. no   COVID. .. 1/11 (-)       BEST PRACTICE ISSUES.    HOB ELEVATN.  .. Yes  DVT PPLX.  ..   1/11 lvnx 40         DIET.   ..  1/11 dash       ALLGY. ..     nka  WT. ..   1/11/2024 83  BMI. ..   1/11/2024 31    ABGS.   .  VS/ PO/IO/ VENT/ DRIPS.   1/14/2024 99f 73 150/80   1/14/2024 ra 100%     PRESENTATION.  . CC 1/11/2024.pain abdomen uti   . HPI 1/11/2024.  . 73 f with pain abdomen several days diffuse pain more l flank with dysuria and fever 101   . Pt was given bactrim for uti at urgent care on 1/9   . PMHx . hytn hypothyroid   . ER MEDS .  . 1/11/2024 8p rocephin   . 1/11/2024 ns 1700     PROBLEM DATA.  INFECTION  . E COLI BACTEREMIA 1/11 ESBL (+)Ctx M (+)   . SEPSIS   . PYELONEPHRITIS   .. W 1/11-1/12-1/13-1/14/2024 w 11.6-10 - 7.8 - 5.9   .. pr 1/12/2024 pr .3   .. ua 1/11/2024 ua e 9 le mod n (+)   .. cxr 1/12/2024   .... susp rml infiltr/atelectasis   .. CTAP ic 1/11/2024   .... bl pyelonephritis concomitant pyelitis and cystitis   .. bc 1/11 E coli esbl ctx m (+)   .. uc 1/11 10-49 E coli   .. 1/11-1/13/2024 rocephin  .. 1/13/2024 ertapenem    . RML ATELECTASIS 1/11 CXR     . HEMODYNAMICS  .. la 1/11/2024 la 1   . 1/11/2024 BP ok    . ATELECTASIS   .. CT ch 1/12/2024   .... no consolidatn  .... subsegmental lingular and rml atelectasis     HEMAT  .. Hb 1/11-1/12-1/13/2024 Hb 12.5 - 11 - 11.2   .. inr 1/11/2024 inr 121     . HYPONATREMIA   .. Na 1/11-1/12-1/13/2024 na 134 - 136- 135  .. K 1/11/2024 K 4  .. Cr 1/11/2024 cr .9     . ELEVATED LFTS   .. ap 1/11-1/13/2024 ap 169 - 139  .. ast 1/11-1/13/2024 ast 30 - 18  .. alt 1/11-1/13/2024 alt 41 - 28     . FAMILY COMMUNICATION  .. 1/14/2024 dw family bedside answered questions   . .  .    . SUMMARY.     . 73 f PMH Hytn hypothyroid admitted 1/11/2024 with pain abdomen several days diffuse pain more l flank with dysuria and fever 101 CT cw bl pyelonephritis started on rocephin 1/11/2024   . Pulm Crit Care consulted 1/11/2024 for sepsis  . 1/11 bc grew E coli esbl ctx m (+) and escalated to ertapenem      . OVERALL PLAN.  . . E COLI BACTEREMIA 1/11 ESBL (+)Ctx M (+)    . Pyelonephritis  .. Rocephin -> ertapenem     . RML ATELECTASIS 1/11 CXR   .. CT ch 1/12/2024   .... no consolidatn  .... subsegmental lingular and rml atelectasis   .. 1/13/2024 dw son bedside advised repeat ct 2 m Given my office card     . ACTIVE ISSUE(S).  .. Rx . E COLI BACTEREMIA 1/11 ESBL (+)Ctx M (+)  pyelonephritis with ertapenem started 1/13/2024    TIME SPENT.  . Over 36 minutes aggregate care time spent on encounter; activities included   direct patient care, counseling and/or coordinating care reviewing notes, lab data/ imaging , discussion with multidisciplinary team/ patient  /family and explaining in detail risks, benefits, alternatives  of the recommendations     PATIENT.  . ELO BERGMAN 73 f 1/11/2024 1951 DR EDMOND GASCA

## 2024-01-14 NOTE — PROGRESS NOTE ADULT - SUBJECTIVE AND OBJECTIVE BOX
[facility], Division of Infectious Diseases  LAY Landaverde Y. Patel, S. Shah, G. [facility]  [phone]    Name: PACHECO GASCA  Age: 73y  Gender: Female  MRN: [id number]    Interval History:  Patient seen and examined at bedside  No acute overnight events. Afebrile  No complaints  Notes reviewed    Antibiotics:  ertapenem  IVPB      ertapenem  IVPB 1000 milliGRAM(s) IV Intermittent every 24 hours      Medications:  acetaminophen     Tablet .. 650 milliGRAM(s) Oral every 6 hours PRN  aluminum hydroxide/magnesium hydroxide/simethicone Suspension 30 milliLiter(s) Oral every 4 hours PRN  amLODIPine   Tablet 10 milliGRAM(s) Oral daily  atorvastatin 10 milliGRAM(s) Oral at bedtime  enoxaparin Injectable 40 milliGRAM(s) SubCutaneous every 24 hours  ertapenem  IVPB      ertapenem  IVPB 1000 milliGRAM(s) IV Intermittent every 24 hours  gabapentin 300 milliGRAM(s) Oral two times a day  hydrALAZINE 10 milliGRAM(s) Oral three times a day PRN  lactobacillus acidophilus 1 Tablet(s) Oral every 12 hours  levothyroxine 50 MICROGram(s) Oral daily  magnesium hydroxide Suspension 30 milliLiter(s) Oral daily PRN  melatonin 3 milliGRAM(s) Oral at bedtime PRN  ondansetron Injectable 4 milliGRAM(s) IV Push every 8 hours PRN  pantoprazole    Tablet 40 milliGRAM(s) Oral before breakfast  propranolol 20 milliGRAM(s) Oral at bedtime  senna 2 Tablet(s) Oral at bedtime  traMADol 50 milliGRAM(s) Oral three times a day PRN  zolpidem 5 milliGRAM(s) Oral at bedtime PRN  zolpidem 5 milliGRAM(s) Oral at bedtime PRN      Review of Systems:  A 10-point review of systems was obtained.   Review of systems otherwise negative except as previously noted.    Allergies: No Known Allergies    For details regarding the patient's past medical history, social history, family history, and other miscellaneous elements, please refer the initial infectious diseases consultation and/or the admitting history and physical examination for this admission.    Objective:  Vitals:   T(C): 36.4 (01-14-24 @ 13:15), Max: 37.4 (01-13-24 @ 18:56)  HR: 73 (01-14-24 @ 13:15) (59 - 79)  BP: 150/80 (01-14-24 @ 13:15) (125/69 - 150/80)  RR: 16 (01-14-24 @ 13:15) (16 - 18)  SpO2: 100% (01-14-24 @ 13:15) (95% - 100%)    Physical Examination:  General: no acute distress  HEENT: NC/AT, EOMI,   Cardio: S1, S2 heard, RRR, no murmurs  Resp: decreased breath sounds  Abd: soft, NT, ND,   Ext: no edema or cyanosis  Skin: warm, dry, no visible rash      Laboratory Studies:  CBC:                       12.6   5.96  )-----------( 326      ( 14 Jan 2024 06:15 )             38.9     CMP: 01-14    137  |  101  |  11  ----------------------------<  97  4.4   |  25  |  0.78    Ca    9.1      14 Jan 2024 06:15    TPro  7.1  /  Alb  2.8<L>  /  TBili  0.4  /  DBili  x   /  AST  18  /  ALT  28  /  AlkPhos  139<H>  01-13    LIVER FUNCTIONS - ( 13 Jan 2024 06:15 )  Alb: 2.8 g/dL / Pro: 7.1 g/dL / ALK PHOS: 139 U/L / ALT: 28 U/L / AST: 18 U/L / GGT: x           Urinalysis Basic - ( 14 Jan 2024 06:15 )    Color: x / Appearance: x / SG: x / pH: x  Gluc: 97 mg/dL / Ketone: x  / Bili: x / Urobili: x   Blood: x / Protein: x / Nitrite: x   Leuk Esterase: x / RBC: x / WBC x   Sq Epi: x / Non Sq Epi: x / Bacteria: x        Microbiology: reviewed    Culture - Urine (collected 01-11-24 @ 22:44)  Source: Clean Catch Clean Catch (Midstream)  Preliminary Report (01-13-24 @ 22:53):    10,000 - 49,000 CFU/mL Escherichia coli    Culture - Blood (collected 01-11-24 @ 20:48)  Source: .Blood Blood-Peripheral  Preliminary Report (01-14-24 @ 01:02):    No growth at 48 Hours    Culture - Blood (collected 01-11-24 @ 20:48)  Source: .Blood Blood-Peripheral  Gram Stain (01-13-24 @ 00:23):    Growth in aerobic bottle: Gram Negative Rods  Preliminary Report (01-13-24 @ 19:40):    Growth in aerobic bottle: Escherichia coli    Direct identification is available within approximately 3-5    hours either by Blood Panel Multiplexed PCR or Direct    MALDI-TOF. Details: [email or web address]  Organism: Blood Culture PCR (01-13-24 @ 02:01)  Organism: Blood Culture PCR (01-13-24 @ 02:01)      Method Type: PCR      -  Escherichia coli: Detec      -  ESBL: Detec      -  CTX-M Resistance Marker: Detec          Radiology: reviewed       [facility], Division of Infectious Diseases  LAY Landaverde Y. Patel, S. Shah, G. [facility]  [phone]    Name: PACHECO GASCA  Age: 73y  Gender: Female  MRN: [id number]    Interval History:  Patient seen and examined at bedside  No acute overnight events. Afebrile  No complaints  Notes reviewed    Antibiotics:  ertapenem  IVPB      ertapenem  IVPB 1000 milliGRAM(s) IV Intermittent every 24 hours      Medications:  acetaminophen     Tablet .. 650 milliGRAM(s) Oral every 6 hours PRN  aluminum hydroxide/magnesium hydroxide/simethicone Suspension 30 milliLiter(s) Oral every 4 hours PRN  amLODIPine   Tablet 10 milliGRAM(s) Oral daily  atorvastatin 10 milliGRAM(s) Oral at bedtime  enoxaparin Injectable 40 milliGRAM(s) SubCutaneous every 24 hours  ertapenem  IVPB      ertapenem  IVPB 1000 milliGRAM(s) IV Intermittent every 24 hours  gabapentin 300 milliGRAM(s) Oral two times a day  hydrALAZINE 10 milliGRAM(s) Oral three times a day PRN  lactobacillus acidophilus 1 Tablet(s) Oral every 12 hours  levothyroxine 50 MICROGram(s) Oral daily  magnesium hydroxide Suspension 30 milliLiter(s) Oral daily PRN  melatonin 3 milliGRAM(s) Oral at bedtime PRN  ondansetron Injectable 4 milliGRAM(s) IV Push every 8 hours PRN  pantoprazole    Tablet 40 milliGRAM(s) Oral before breakfast  propranolol 20 milliGRAM(s) Oral at bedtime  senna 2 Tablet(s) Oral at bedtime  traMADol 50 milliGRAM(s) Oral three times a day PRN  zolpidem 5 milliGRAM(s) Oral at bedtime PRN  zolpidem 5 milliGRAM(s) Oral at bedtime PRN      Review of Systems:  A 10-point review of systems was obtained.   Review of systems otherwise negative except as previously noted.    Allergies: No Known Allergies    For details regarding the patient's past medical history, social history, family history, and other miscellaneous elements, please refer the initial infectious diseases consultation and/or the admitting history and physical examination for this admission.    Objective:  Vitals:   T(C): 36.4 (01-14-24 @ 13:15), Max: 37.4 (01-13-24 @ 18:56)  HR: 73 (01-14-24 @ 13:15) (59 - 79)  BP: 150/80 (01-14-24 @ 13:15) (125/69 - 150/80)  RR: 16 (01-14-24 @ 13:15) (16 - 18)  SpO2: 100% (01-14-24 @ 13:15) (95% - 100%)    Physical Examination:  General: no acute distress  HEENT: NC/AT, EOMI,   Cardio: S1, S2 heard, RRR, no murmurs  Resp: decreased breath sounds  Abd: soft, NT, ND,   Ext: no edema or cyanosis  Skin: warm, dry, no visible rash      Laboratory Studies:  CBC:                       12.6   5.96  )-----------( 326      ( 14 Jan 2024 06:15 )             38.9     CMP: 01-14    137  |  101  |  11  ----------------------------<  97  4.4   |  25  |  0.78    Ca    9.1      14 Jan 2024 06:15    TPro  7.1  /  Alb  2.8<L>  /  TBili  0.4  /  DBili  x   /  AST  18  /  ALT  28  /  AlkPhos  139<H>  01-13    LIVER FUNCTIONS - ( 13 Jan 2024 06:15 )  Alb: 2.8 g/dL / Pro: 7.1 g/dL / ALK PHOS: 139 U/L / ALT: 28 U/L / AST: 18 U/L / GGT: x           Urinalysis Basic - ( 14 Jan 2024 06:15 )    Color: x / Appearance: x / SG: x / pH: x  Gluc: 97 mg/dL / Ketone: x  / Bili: x / Urobili: x   Blood: x / Protein: x / Nitrite: x   Leuk Esterase: x / RBC: x / WBC x   Sq Epi: x / Non Sq Epi: x / Bacteria: x        Microbiology: reviewed    Culture - Urine (collected 01-11-24 @ 22:44)  Source: Clean Catch Clean Catch (Midstream)  Preliminary Report (01-13-24 @ 22:53):    10,000 - 49,000 CFU/mL Escherichia coli    Culture - Blood (collected 01-11-24 @ 20:48)  Source: .Blood Blood-Peripheral  Preliminary Report (01-14-24 @ 01:02):    No growth at 48 Hours    Culture - Blood (collected 01-11-24 @ 20:48)  Source: .Blood Blood-Peripheral  Gram Stain (01-13-24 @ 00:23):    Growth in aerobic bottle: Gram Negative Rods  Preliminary Report (01-13-24 @ 19:40):    Growth in aerobic bottle: Escherichia coli    Direct identification is available within approximately 3-5    hours either by Blood Panel Multiplexed PCR or Direct    MALDI-TOF. Details: [email or web address]  Organism: Blood Culture PCR (01-13-24 @ 02:01)  Organism: Blood Culture PCR (01-13-24 @ 02:01)      Method Type: PCR      -  Escherichia coli: Detec      -  ESBL: Detec      -  CTX-M Resistance Marker: Detec          Radiology: reviewed

## 2024-01-14 NOTE — PROGRESS NOTE ADULT - SUBJECTIVE AND OBJECTIVE BOX
PROGRESS NOTE  Patient is a 73y old  Female who presents with a chief complaint of dysuria , fever and left flank pain (14 Jan 2024 11:04)    Chart and available morning labs /imaging are reviewed electronically , urgent issues addressed . More information  is being added upon completion of rounds , when more information is collected and management discussed with consultants , medical staff and social service/case management on the floor   OVERNIGHT    No new issues reported by medical staff . All above noted Patient is resting in a bed comfortably .Feels much better  .No distress noted  is at bedside , poc discussed   HPI:  Patient is a 73-year-old female with past medical history of hypertension and hypothyroidism presents with abdominal pain for days.  Patient reports diffuse abdominal pain rating to the left flank with dysuria.  Patient reports associated fever 101 Tmax for the past week.  Patient went to urgent care 2 days ago and was diagnosed with a UTI and given Bactrim which patient took 3 doses.  Patient took Tylenol for fever last dose was at 6 PM today.  Patient denies chest pain, shortness of breath, cough, nausea, vomiting, diarrhea, rash. CT abd showed Bilateral pyelonephritis concomitant pyelitis and cystitis. No drainable   fluid collection. Started on iv abx , septic workup sent , ID cons requested  (12 Jan 2024 06:44)    PAST MEDICAL & SURGICAL HISTORY:  HTN (hypertension)      Hypothyroid      Insomnia      GERD (gastroesophageal reflux disease)      HLD (hyperlipidemia)      Neuropathy          MEDICATIONS  (STANDING):  amLODIPine   Tablet 10 milliGRAM(s) Oral daily  atorvastatin 10 milliGRAM(s) Oral at bedtime  enoxaparin Injectable 40 milliGRAM(s) SubCutaneous every 24 hours  ertapenem  IVPB      ertapenem  IVPB 1000 milliGRAM(s) IV Intermittent every 24 hours  gabapentin 300 milliGRAM(s) Oral two times a day  lactobacillus acidophilus 1 Tablet(s) Oral every 12 hours  levothyroxine 50 MICROGram(s) Oral daily  pantoprazole    Tablet 40 milliGRAM(s) Oral before breakfast  propranolol 20 milliGRAM(s) Oral at bedtime  senna 2 Tablet(s) Oral at bedtime    MEDICATIONS  (PRN):  acetaminophen     Tablet .. 650 milliGRAM(s) Oral every 6 hours PRN Temp greater or equal to 38C (100.4F), Mild Pain (1 - 3)  aluminum hydroxide/magnesium hydroxide/simethicone Suspension 30 milliLiter(s) Oral every 4 hours PRN Dyspepsia  hydrALAZINE 10 milliGRAM(s) Oral three times a day PRN Systolic blood pressure >160  magnesium hydroxide Suspension 30 milliLiter(s) Oral daily PRN Constipation  melatonin 3 milliGRAM(s) Oral at bedtime PRN Insomnia  ondansetron Injectable 4 milliGRAM(s) IV Push every 8 hours PRN Nausea and/or Vomiting  traMADol 50 milliGRAM(s) Oral three times a day PRN Moderate Pain (4 - 6)  zolpidem 5 milliGRAM(s) Oral at bedtime PRN Insomnia  zolpidem 5 milliGRAM(s) Oral at bedtime PRN Insomnia      OBJECTIVE    T(C): 36.8 (01-14-24 @ 05:00), Max: 37.4 (01-13-24 @ 18:56)  HR: 59 (01-14-24 @ 05:00) (59 - 79)  BP: 134/77 (01-14-24 @ 05:00) (125/69 - 134/77)  RR: 18 (01-14-24 @ 05:00) (17 - 18)  SpO2: 97% (01-14-24 @ 05:00) (95% - 97%)  Wt(kg): --  I&O's Summary        REVIEW OF SYSTEMS:  CONSTITUTIONAL: No fever, weight loss, or fatigue  EYES: No eye pain, visual disturbances, or discharge  ENMT:   No sinus or throat pain  NECK: No pain or stiffness  RESPIRATORY: No cough, wheezing, chills or hemoptysis; No shortness of breath  CARDIOVASCULAR: No chest pain, palpitations, dizziness, or leg swelling  GASTROINTESTINAL: No abdominal pain. No nausea, vomiting; No diarrhea or constipation. No melena or hematochezia.  GENITOURINARY: No dysuria, frequency, hematuria, or incontinence  NEUROLOGICAL: No headaches, memory loss, loss of strength, numbness, or tremors  SKIN: No itching, burning, rashes, or lesions   MUSCULOSKELETAL: No joint pain or swelling; No muscle, back, or extremity pain    PHYSICAL EXAM:  Appearance: NAD. VS past 24 hrs -as above   HEENT:   Moist oral mucosa. Conjunctiva clear b/l.   Neck : supple  Respiratory: Lungs CTAB.  Gastrointestinal:  Soft, nontender. No rebound. No rigidity. BS present	  Cardiovascular: RRR ,S1S2 present  Neurologic: Non-focal. Moving all extremities.  Extremities: No edema. No erythema. No calf tenderness.  Skin: No rashes, No ecchymoses, No cyanosis.	  wounds ,skin lesions-See skin assesment flow sheet   LABS:                        12.6   5.96  )-----------( 326      ( 14 Jan 2024 06:15 )             38.9     01-14    137  |  101  |  11  ----------------------------<  97  4.4   |  25  |  0.78    Ca    9.1      14 Jan 2024 06:15    TPro  7.1  /  Alb  2.8<L>  /  TBili  0.4  /  DBili  x   /  AST  18  /  ALT  28  /  AlkPhos  139<H>  01-13    CAPILLARY BLOOD GLUCOSE          Urinalysis Basic - ( 14 Jan 2024 06:15 )    Color: x / Appearance: x / SG: x / pH: x  Gluc: 97 mg/dL / Ketone: x  / Bili: x / Urobili: x   Blood: x / Protein: x / Nitrite: x   Leuk Esterase: x / RBC: x / WBC x   Sq Epi: x / Non Sq Epi: x / Bacteria: x        Culture - Urine (collected 11 Jan 2024 22:44)  Source: Clean Catch Clean Catch (Midstream)  Preliminary Report (13 Jan 2024 22:53):    10,000 - 49,000 CFU/mL Escherichia coli    Culture - Blood (collected 11 Jan 2024 20:48)  Source: .Blood Blood-Peripheral  Preliminary Report (14 Jan 2024 01:02):    No growth at 48 Hours    Culture - Blood (collected 11 Jan 2024 20:48)  Source: .Blood Blood-Peripheral  Gram Stain (13 Jan 2024 00:23):    Growth in aerobic bottle: Gram Negative Rods  Preliminary Report (13 Jan 2024 19:40):    Growth in aerobic bottle: Escherichia coli    Direct identification is available within approximately 3-5    hours either by Blood Panel Multiplexed PCR or Direct    MALDI-TOF. Details: [email or web address]  Organism: Blood Culture PCR (13 Jan 2024 02:01)  Organism: Blood Culture PCR (13 Jan 2024 02:01)      RADIOLOGY & ADDITIONAL TESTS:   reviewed elctronically  ASSESSMENT/PLAN: 	    25 minutes aggregate time was spent on this visit, 50% visit time spent in care co-ordination with other attendings and counselling patient .I have discussed care plan with patient / HCP/family member ,who expressed understanding of problems treatment and their effect and side effects, alternatives in details. I have asked if they have any questions and concerns and appropriately addressed them to best of my ability.  PROGRESS NOTE  Patient is a 73y old  Female who presents with a chief complaint of dysuria , fever and left flank pain (14 Jan 2024 11:04)    Chart and available morning labs /imaging are reviewed electronically , urgent issues addressed . More information  is being added upon completion of rounds , when more information is collected and management discussed with consultants , medical staff and social service/case management on the floor   OVERNIGHT    No new issues reported by medical staff . All above noted Patient is resting in a bed comfortably .Feels much better  .No distress noted  is at bedside , poc discussed   HPI:  Patient is a 73-year-old female with past medical history of hypertension and hypothyroidism presents with abdominal pain for days.  Patient reports diffuse abdominal pain rating to the left flank with dysuria.  Patient reports associated fever 101 Tmax for the past week.  Patient went to urgent care 2 days ago and was diagnosed with a UTI and given Bactrim which patient took 3 doses.  Patient took Tylenol for fever last dose was at 6 PM today.  Patient denies chest pain, shortness of breath, cough, nausea, vomiting, diarrhea, rash. CT abd showed Bilateral pyelonephritis concomitant pyelitis and cystitis. No drainable   fluid collection. Started on iv abx , septic workup sent , ID cons requested  (12 Jan 2024 06:44)    PAST MEDICAL & SURGICAL HISTORY:  HTN (hypertension)      Hypothyroid      Insomnia      GERD (gastroesophageal reflux disease)      HLD (hyperlipidemia)      Neuropathy          MEDICATIONS  (STANDING):  amLODIPine   Tablet 10 milliGRAM(s) Oral daily  atorvastatin 10 milliGRAM(s) Oral at bedtime  enoxaparin Injectable 40 milliGRAM(s) SubCutaneous every 24 hours  ertapenem  IVPB      ertapenem  IVPB 1000 milliGRAM(s) IV Intermittent every 24 hours  gabapentin 300 milliGRAM(s) Oral two times a day  lactobacillus acidophilus 1 Tablet(s) Oral every 12 hours  levothyroxine 50 MICROGram(s) Oral daily  pantoprazole    Tablet 40 milliGRAM(s) Oral before breakfast  propranolol 20 milliGRAM(s) Oral at bedtime  senna 2 Tablet(s) Oral at bedtime    MEDICATIONS  (PRN):  acetaminophen     Tablet .. 650 milliGRAM(s) Oral every 6 hours PRN Temp greater or equal to 38C (100.4F), Mild Pain (1 - 3)  aluminum hydroxide/magnesium hydroxide/simethicone Suspension 30 milliLiter(s) Oral every 4 hours PRN Dyspepsia  hydrALAZINE 10 milliGRAM(s) Oral three times a day PRN Systolic blood pressure >160  magnesium hydroxide Suspension 30 milliLiter(s) Oral daily PRN Constipation  melatonin 3 milliGRAM(s) Oral at bedtime PRN Insomnia  ondansetron Injectable 4 milliGRAM(s) IV Push every 8 hours PRN Nausea and/or Vomiting  traMADol 50 milliGRAM(s) Oral three times a day PRN Moderate Pain (4 - 6)  zolpidem 5 milliGRAM(s) Oral at bedtime PRN Insomnia  zolpidem 5 milliGRAM(s) Oral at bedtime PRN Insomnia      OBJECTIVE    T(C): 36.8 (01-14-24 @ 05:00), Max: 37.4 (01-13-24 @ 18:56)  HR: 59 (01-14-24 @ 05:00) (59 - 79)  BP: 134/77 (01-14-24 @ 05:00) (125/69 - 134/77)  RR: 18 (01-14-24 @ 05:00) (17 - 18)  SpO2: 97% (01-14-24 @ 05:00) (95% - 97%)  Wt(kg): --  I&O's Summary        REVIEW OF SYSTEMS:  CONSTITUTIONAL: No fever, weight loss, or fatigue  EYES: No eye pain, visual disturbances, or discharge  ENMT:   No sinus or throat pain  NECK: No pain or stiffness  RESPIRATORY: No cough, wheezing, chills or hemoptysis; No shortness of breath  CARDIOVASCULAR: No chest pain, palpitations, dizziness, or leg swelling  GASTROINTESTINAL: No abdominal pain. No nausea, vomiting; No diarrhea or constipation. No melena or hematochezia.  GENITOURINARY: No dysuria, frequency, hematuria, or incontinence  NEUROLOGICAL: No headaches, memory loss, loss of strength, numbness, or tremors  SKIN: No itching, burning, rashes, or lesions   MUSCULOSKELETAL: No joint pain or swelling; No muscle, back, or extremity pain    PHYSICAL EXAM:  Appearance: NAD. VS past 24 hrs -as above   HEENT:   Moist oral mucosa. Conjunctiva clear b/l.   Neck : supple  Respiratory: Lungs CTAB.  Gastrointestinal:  Soft, nontender. No rebound. No rigidity. BS present	  Cardiovascular: RRR ,S1S2 present  Neurologic: Non-focal. Moving all extremities.  Extremities: No edema. No erythema. No calf tenderness.  Skin: No rashes, No ecchymoses, No cyanosis.	  wounds ,skin lesions-See skin assesment flow sheet   LABS:                        12.6   5.96  )-----------( 326      ( 14 Jan 2024 06:15 )             38.9     01-14    137  |  101  |  11  ----------------------------<  97  4.4   |  25  |  0.78    Ca    9.1      14 Jan 2024 06:15    TPro  7.1  /  Alb  2.8<L>  /  TBili  0.4  /  DBili  x   /  AST  18  /  ALT  28  /  AlkPhos  139<H>  01-13    CAPILLARY BLOOD GLUCOSE          Urinalysis Basic - ( 14 Jan 2024 06:15 )    Color: x / Appearance: x / SG: x / pH: x  Gluc: 97 mg/dL / Ketone: x  / Bili: x / Urobili: x   Blood: x / Protein: x / Nitrite: x   Leuk Esterase: x / RBC: x / WBC x   Sq Epi: x / Non Sq Epi: x / Bacteria: x        Culture - Urine (collected 11 Jan 2024 22:44)  Source: Clean Catch Clean Catch (Midstream)  Preliminary Report (13 Jan 2024 22:53):    10,000 - 49,000 CFU/mL Escherichia coli    Culture - Blood (collected 11 Jan 2024 20:48)  Source: .Blood Blood-Peripheral  Preliminary Report (14 Jan 2024 01:02):    No growth at 48 Hours    Culture - Blood (collected 11 Jan 2024 20:48)  Source: .Blood Blood-Peripheral  Gram Stain (13 Jan 2024 00:23):    Growth in aerobic bottle: Gram Negative Rods  Preliminary Report (13 Jan 2024 19:40):    Growth in aerobic bottle: Escherichia coli    Direct identification is available within approximately 3-5    hours either by Blood Panel Multiplexed PCR or Direct    MALDI-TOF. Details: [email or web address]  Organism: Blood Culture PCR (13 Jan 2024 02:01)  Organism: Blood Culture PCR (13 Jan 2024 02:01)      RADIOLOGY & ADDITIONAL TESTS:   reviewed elctronically  ASSESSMENT/PLAN: 	    25 minutes aggregate time was spent on this visit, 50% visit time spent in care co-ordination with other attendings and counselling patient .I have discussed care plan with patient / HCP/family member ,who expressed understanding of problems treatment and their effect and side effects, alternatives in details. I have asked if they have any questions and concerns and appropriately addressed them to best of my ability.

## 2024-01-14 NOTE — PHYSICAL THERAPY INITIAL EVALUATION ADULT - LIVES WITH, PROFILE
lives with her son private home with no steps outside, inside, has a tub shower . DME - None ./children

## 2024-01-14 NOTE — PROGRESS NOTE ADULT - ASSESSMENT
72YO F PMH hypertension and hypothyroidism admitted with Ecoli ESBL bactreremia sec Acute Pyelonephritis  CT abd showed Bilateral pyelonephritis concomitant pyelitis and cystitis.   Febrile      Recommendations:  Continue Ertapenem  Fu cultures  Repeat blood cultures  Trend temps and cbc  Pulm toileting    Infectious Diseases will continue to follow. Please call with any questions.   Kim Mac M.D.  [facility] of [facility]ious [facility] [phone]  For after 5 P.M. and weekends, please call [phone]   72YO F PMH hypertension and hypothyroidism admitted with Ecoli ESBL bactreremia sec Acute Pyelonephritis  CT abd showed Bilateral pyelonephritis concomitant pyelitis and cystitis.   Febrile      Recommendations:  Continue Ertapenem  Fu cultures  Repeat blood cultures  Trend temps and cbc  Pulm toileting    Infectious Diseases will continue to follow. Please call with any questions.   Kim Mac M.D.  [facility] of Infectious [facility] [phone]  For after 5 P.M. and weekends, please call [phone]

## 2024-01-14 NOTE — PHYSICAL THERAPY INITIAL EVALUATION ADULT - NSPTDISCHREC_GEN_A_CORE
Patient is performing at maximum functional potential , not recommended for PT at this time, HEP and safety issues educated ./No skilled PT needs

## 2024-01-14 NOTE — PROGRESS NOTE ADULT - SUBJECTIVE AND OBJECTIVE BOX
CHIEF COMPLAINT/ REASON FOR VISIT  .. Patient was seen to address the  issue listed under PROBLEM LIST which is located toward bottom of this note     PACHECO GASCA    SY 1EST 103 D1    Allergies    No Known Allergies    Intolerances        PAST MEDICAL & SURGICAL HISTORY:  HTN (hypertension)      Hypothyroid      Insomnia      GERD (gastroesophageal reflux disease)      HLD (hyperlipidemia)      Neuropathy          FAMILY HISTORY:      Home Medications:  Ambien 10 mg oral tablet: 1 tab(s) orally once a day (at bedtime) (11 Jan 2024 23:07)  amLODIPine 10 mg oral tablet: 1 tab(s) orally once a day (11 Jan 2024 23:07)  atorvastatin 10 mg oral tablet: 1 tab(s) orally once a day (at bedtime) (11 Jan 2024 23:07)  gabapentin 300 mg oral capsule: 1 cap(s) orally 2 times a day (11 Jan 2024 23:07)  levothyroxine 50 mcg (0.05 mg) oral tablet: 1 tab(s) orally once a day (11 Jan 2024 23:07)  pantoprazole 20 mg oral delayed release tablet: 1 tab(s) orally once a day (at bedtime) (11 Jan 2024 23:07)  propranolol 20 mg oral tablet: 1 tab(s) orally once a day (at bedtime) (11 Jan 2024 23:07)      MEDICATIONS  (STANDING):  amLODIPine   Tablet 10 milliGRAM(s) Oral daily  atorvastatin 10 milliGRAM(s) Oral at bedtime  enoxaparin Injectable 40 milliGRAM(s) SubCutaneous every 24 hours  ertapenem  IVPB      ertapenem  IVPB 1000 milliGRAM(s) IV Intermittent every 24 hours  gabapentin 300 milliGRAM(s) Oral two times a day  lactobacillus acidophilus 1 Tablet(s) Oral every 12 hours  levothyroxine 50 MICROGram(s) Oral daily  pantoprazole    Tablet 40 milliGRAM(s) Oral before breakfast  propranolol 20 milliGRAM(s) Oral at bedtime  senna 2 Tablet(s) Oral at bedtime    MEDICATIONS  (PRN):  acetaminophen     Tablet .. 650 milliGRAM(s) Oral every 6 hours PRN Temp greater or equal to 38C (100.4F), Mild Pain (1 - 3)  aluminum hydroxide/magnesium hydroxide/simethicone Suspension 30 milliLiter(s) Oral every 4 hours PRN Dyspepsia  hydrALAZINE 10 milliGRAM(s) Oral three times a day PRN Systolic blood pressure >160  magnesium hydroxide Suspension 30 milliLiter(s) Oral daily PRN Constipation  melatonin 3 milliGRAM(s) Oral at bedtime PRN Insomnia  ondansetron Injectable 4 milliGRAM(s) IV Push every 8 hours PRN Nausea and/or Vomiting  traMADol 50 milliGRAM(s) Oral three times a day PRN Moderate Pain (4 - 6)  zolpidem 5 milliGRAM(s) Oral at bedtime PRN Insomnia  zolpidem 5 milliGRAM(s) Oral at bedtime PRN Insomnia      Diet, DASH/TLC:   Sodium & Cholesterol Restricted (01-11-24 @ 23:45) [Active]          Vital Signs Last 24 Hrs  T(C): 36.8 (14 Jan 2024 05:00), Max: 37.4 (13 Jan 2024 18:56)  T(F): 98.2 (14 Jan 2024 05:00), Max: 99.4 (13 Jan 2024 18:56)  HR: 59 (14 Jan 2024 05:00) (59 - 79)  BP: 134/77 (14 Jan 2024 05:00) (125/69 - 134/77)  BP(mean): --  RR: 18 (14 Jan 2024 05:00) (17 - 18)  SpO2: 97% (14 Jan 2024 05:00) (95% - 97%)    Parameters below as of 14 Jan 2024 05:00  Patient On (Oxygen Delivery Method): room air                  LABS:                        12.6   5.96  )-----------( 326      ( 14 Jan 2024 06:15 )             38.9     01-14    137  |  101  |  11  ----------------------------<  97  4.4   |  25  |  0.78    Ca    9.1      14 Jan 2024 06:15    TPro  7.1  /  Alb  2.8<L>  /  TBili  0.4  /  DBili  x   /  AST  18  /  ALT  28  /  AlkPhos  139<H>  01-13      Urinalysis Basic - ( 14 Jan 2024 06:15 )    Color: x / Appearance: x / SG: x / pH: x  Gluc: 97 mg/dL / Ketone: x  / Bili: x / Urobili: x   Blood: x / Protein: x / Nitrite: x   Leuk Esterase: x / RBC: x / WBC x   Sq Epi: x / Non Sq Epi: x / Bacteria: x            WBC:  WBC Count: 5.96 K/uL (01-14 @ 06:15)  WBC Count: 7.81 K/uL (01-13 @ 06:15)  WBC Count: 10.04 K/uL (01-12 @ 06:30)  WBC Count: 11.60 K/uL (01-11 @ 20:48)      MICROBIOLOGY:  RECENT CULTURES:  01-11 Clean Catch Clean Catch (Midstream) XXXX XXXX   10,000 - 49,000 CFU/mL Escherichia coli    01-11 .Blood Blood-Peripheral Blood Culture PCR   Growth in aerobic bottle: Gram Negative Rods   No growth at 48 Hours                    Sodium:  Sodium: 137 mmol/L (01-14 @ 06:15)  Sodium: 135 mmol/L (01-13 @ 06:15)  Sodium: 136 mmol/L (01-12 @ 06:30)  Sodium: 134 mmol/L (01-11 @ 20:48)      0.78 mg/dL 01-14 @ 06:15  0.80 mg/dL 01-13 @ 06:15  0.77 mg/dL 01-12 @ 06:30  0.96 mg/dL 01-11 @ 20:48      Hemoglobin:  Hemoglobin: 12.6 g/dL (01-14 @ 06:15)  Hemoglobin: 11.2 g/dL (01-13 @ 06:15)  Hemoglobin: 11.3 g/dL (01-12 @ 06:30)  Hemoglobin: 12.5 g/dL (01-11 @ 20:48)      Platelets: Platelet Count - Automated: 326 K/uL (01-14 @ 06:15)  Platelet Count - Automated: 283 K/uL (01-13 @ 06:15)  Platelet Count - Automated: 257 K/uL (01-12 @ 06:30)  Platelet Count - Automated: 290 K/uL (01-11 @ 20:48)      LIVER FUNCTIONS - ( 13 Jan 2024 06:15 )  Alb: 2.8 g/dL / Pro: 7.1 g/dL / ALK PHOS: 139 U/L / ALT: 28 U/L / AST: 18 U/L / GGT: x             Urinalysis Basic - ( 14 Jan 2024 06:15 )    Color: x / Appearance: x / SG: x / pH: x  Gluc: 97 mg/dL / Ketone: x  / Bili: x / Urobili: x   Blood: x / Protein: x / Nitrite: x   Leuk Esterase: x / RBC: x / WBC x   Sq Epi: x / Non Sq Epi: x / Bacteria: x        RADIOLOGY & ADDITIONAL STUDIES:      MICROBIOLOGY:  RECENT CULTURES:  01-11 Clean Catch Clean Catch (Midstream) XXXX XXXX   10,000 - 49,000 CFU/mL Escherichia coli    01-11 .Blood Blood-Peripheral Blood Culture PCR   Growth in aerobic bottle: Gram Negative Rods   No growth at 48 Hours

## 2024-01-15 LAB
-  AMOXICILLIN/CLAVULANIC ACID: SIGNIFICANT CHANGE UP
-  AMOXICILLIN/CLAVULANIC ACID: SIGNIFICANT CHANGE UP
-  AMPICILLIN/SULBACTAM: SIGNIFICANT CHANGE UP
-  AMPICILLIN/SULBACTAM: SIGNIFICANT CHANGE UP
-  AMPICILLIN: SIGNIFICANT CHANGE UP
-  AMPICILLIN: SIGNIFICANT CHANGE UP
-  AZTREONAM: SIGNIFICANT CHANGE UP
-  AZTREONAM: SIGNIFICANT CHANGE UP
-  CEFAZOLIN: SIGNIFICANT CHANGE UP
-  CEFAZOLIN: SIGNIFICANT CHANGE UP
-  CEFEPIME: SIGNIFICANT CHANGE UP
-  CEFEPIME: SIGNIFICANT CHANGE UP
-  CEFTRIAXONE: SIGNIFICANT CHANGE UP
-  CEFTRIAXONE: SIGNIFICANT CHANGE UP
-  CEFUROXIME: SIGNIFICANT CHANGE UP
-  CEFUROXIME: SIGNIFICANT CHANGE UP
-  CIPROFLOXACIN: SIGNIFICANT CHANGE UP
-  CIPROFLOXACIN: SIGNIFICANT CHANGE UP
-  ERTAPENEM: SIGNIFICANT CHANGE UP
-  ERTAPENEM: SIGNIFICANT CHANGE UP
-  GENTAMICIN: SIGNIFICANT CHANGE UP
-  GENTAMICIN: SIGNIFICANT CHANGE UP
-  IMIPENEM: SIGNIFICANT CHANGE UP
-  IMIPENEM: SIGNIFICANT CHANGE UP
-  LEVOFLOXACIN: SIGNIFICANT CHANGE UP
-  LEVOFLOXACIN: SIGNIFICANT CHANGE UP
-  MEROPENEM: SIGNIFICANT CHANGE UP
-  MEROPENEM: SIGNIFICANT CHANGE UP
-  NITROFURANTOIN: SIGNIFICANT CHANGE UP
-  NITROFURANTOIN: SIGNIFICANT CHANGE UP
-  PIPERACILLIN/TAZOBACTAM: SIGNIFICANT CHANGE UP
-  PIPERACILLIN/TAZOBACTAM: SIGNIFICANT CHANGE UP
-  TOBRAMYCIN: SIGNIFICANT CHANGE UP
-  TOBRAMYCIN: SIGNIFICANT CHANGE UP
-  TRIMETHOPRIM/SULFAMETHOXAZOLE: SIGNIFICANT CHANGE UP
-  TRIMETHOPRIM/SULFAMETHOXAZOLE: SIGNIFICANT CHANGE UP
ANION GAP SERPL CALC-SCNC: 10 MMOL/L — SIGNIFICANT CHANGE UP (ref 5–17)
ANION GAP SERPL CALC-SCNC: 10 MMOL/L — SIGNIFICANT CHANGE UP (ref 5–17)
BUN SERPL-MCNC: 9 MG/DL — SIGNIFICANT CHANGE UP (ref 7–23)
BUN SERPL-MCNC: 9 MG/DL — SIGNIFICANT CHANGE UP (ref 7–23)
CALCIUM SERPL-MCNC: 9 MG/DL — SIGNIFICANT CHANGE UP (ref 8.4–10.5)
CALCIUM SERPL-MCNC: 9 MG/DL — SIGNIFICANT CHANGE UP (ref 8.4–10.5)
CHLORIDE SERPL-SCNC: 100 MMOL/L — SIGNIFICANT CHANGE UP (ref 96–108)
CHLORIDE SERPL-SCNC: 100 MMOL/L — SIGNIFICANT CHANGE UP (ref 96–108)
CO2 SERPL-SCNC: 26 MMOL/L — SIGNIFICANT CHANGE UP (ref 22–31)
CO2 SERPL-SCNC: 26 MMOL/L — SIGNIFICANT CHANGE UP (ref 22–31)
CREAT SERPL-MCNC: 0.82 MG/DL — SIGNIFICANT CHANGE UP (ref 0.5–1.3)
CREAT SERPL-MCNC: 0.82 MG/DL — SIGNIFICANT CHANGE UP (ref 0.5–1.3)
CULTURE RESULTS: ABNORMAL
EGFR: 76 ML/MIN/1.73M2 — SIGNIFICANT CHANGE UP
EGFR: 76 ML/MIN/1.73M2 — SIGNIFICANT CHANGE UP
GLUCOSE SERPL-MCNC: 118 MG/DL — HIGH (ref 70–99)
GLUCOSE SERPL-MCNC: 118 MG/DL — HIGH (ref 70–99)
GRAM STN FLD: ABNORMAL
GRAM STN FLD: ABNORMAL
HCT VFR BLD CALC: 38.1 % — SIGNIFICANT CHANGE UP (ref 34.5–45)
HCT VFR BLD CALC: 38.1 % — SIGNIFICANT CHANGE UP (ref 34.5–45)
HGB BLD-MCNC: 12.5 G/DL — SIGNIFICANT CHANGE UP (ref 11.5–15.5)
HGB BLD-MCNC: 12.5 G/DL — SIGNIFICANT CHANGE UP (ref 11.5–15.5)
MCHC RBC-ENTMCNC: 29.3 PG — SIGNIFICANT CHANGE UP (ref 27–34)
MCHC RBC-ENTMCNC: 29.3 PG — SIGNIFICANT CHANGE UP (ref 27–34)
MCHC RBC-ENTMCNC: 32.8 GM/DL — SIGNIFICANT CHANGE UP (ref 32–36)
MCHC RBC-ENTMCNC: 32.8 GM/DL — SIGNIFICANT CHANGE UP (ref 32–36)
MCV RBC AUTO: 89.4 FL — SIGNIFICANT CHANGE UP (ref 80–100)
MCV RBC AUTO: 89.4 FL — SIGNIFICANT CHANGE UP (ref 80–100)
METHOD TYPE: SIGNIFICANT CHANGE UP
METHOD TYPE: SIGNIFICANT CHANGE UP
NRBC # BLD: 0 /100 WBCS — SIGNIFICANT CHANGE UP (ref 0–0)
NRBC # BLD: 0 /100 WBCS — SIGNIFICANT CHANGE UP (ref 0–0)
ORGANISM # SPEC MICROSCOPIC CNT: ABNORMAL
PLATELET # BLD AUTO: 381 K/UL — SIGNIFICANT CHANGE UP (ref 150–400)
PLATELET # BLD AUTO: 381 K/UL — SIGNIFICANT CHANGE UP (ref 150–400)
POTASSIUM SERPL-MCNC: 4.3 MMOL/L — SIGNIFICANT CHANGE UP (ref 3.5–5.3)
POTASSIUM SERPL-MCNC: 4.3 MMOL/L — SIGNIFICANT CHANGE UP (ref 3.5–5.3)
POTASSIUM SERPL-SCNC: 4.3 MMOL/L — SIGNIFICANT CHANGE UP (ref 3.5–5.3)
POTASSIUM SERPL-SCNC: 4.3 MMOL/L — SIGNIFICANT CHANGE UP (ref 3.5–5.3)
RBC # BLD: 4.26 M/UL — SIGNIFICANT CHANGE UP (ref 3.8–5.2)
RBC # BLD: 4.26 M/UL — SIGNIFICANT CHANGE UP (ref 3.8–5.2)
RBC # FLD: 14.2 % — SIGNIFICANT CHANGE UP (ref 10.3–14.5)
RBC # FLD: 14.2 % — SIGNIFICANT CHANGE UP (ref 10.3–14.5)
SODIUM SERPL-SCNC: 136 MMOL/L — SIGNIFICANT CHANGE UP (ref 135–145)
SODIUM SERPL-SCNC: 136 MMOL/L — SIGNIFICANT CHANGE UP (ref 135–145)
SPECIMEN SOURCE: SIGNIFICANT CHANGE UP
WBC # BLD: 6.2 K/UL — SIGNIFICANT CHANGE UP (ref 3.8–10.5)
WBC # BLD: 6.2 K/UL — SIGNIFICANT CHANGE UP (ref 3.8–10.5)
WBC # FLD AUTO: 6.2 K/UL — SIGNIFICANT CHANGE UP (ref 3.8–10.5)
WBC # FLD AUTO: 6.2 K/UL — SIGNIFICANT CHANGE UP (ref 3.8–10.5)

## 2024-01-15 RX ADMIN — Medication 650 MILLIGRAM(S): at 14:50

## 2024-01-15 RX ADMIN — ZOLPIDEM TARTRATE 5 MILLIGRAM(S): 10 TABLET ORAL at 01:31

## 2024-01-15 RX ADMIN — Medication 650 MILLIGRAM(S): at 23:29

## 2024-01-15 RX ADMIN — ENOXAPARIN SODIUM 40 MILLIGRAM(S): 100 INJECTION SUBCUTANEOUS at 06:08

## 2024-01-15 RX ADMIN — ZOLPIDEM TARTRATE 5 MILLIGRAM(S): 10 TABLET ORAL at 21:58

## 2024-01-15 RX ADMIN — ATORVASTATIN CALCIUM 10 MILLIGRAM(S): 80 TABLET, FILM COATED ORAL at 21:58

## 2024-01-15 RX ADMIN — SENNA PLUS 2 TABLET(S): 8.6 TABLET ORAL at 21:58

## 2024-01-15 RX ADMIN — Medication 1 TABLET(S): at 08:12

## 2024-01-15 RX ADMIN — Medication 1 TABLET(S): at 17:27

## 2024-01-15 RX ADMIN — Medication 650 MILLIGRAM(S): at 14:18

## 2024-01-15 RX ADMIN — GABAPENTIN 300 MILLIGRAM(S): 400 CAPSULE ORAL at 08:11

## 2024-01-15 RX ADMIN — AMLODIPINE BESYLATE 10 MILLIGRAM(S): 2.5 TABLET ORAL at 08:12

## 2024-01-15 RX ADMIN — Medication 50 MICROGRAM(S): at 06:08

## 2024-01-15 RX ADMIN — ZOLPIDEM TARTRATE 5 MILLIGRAM(S): 10 TABLET ORAL at 23:29

## 2024-01-15 RX ADMIN — GABAPENTIN 300 MILLIGRAM(S): 400 CAPSULE ORAL at 17:26

## 2024-01-15 RX ADMIN — ERTAPENEM SODIUM 120 MILLIGRAM(S): 1 INJECTION, POWDER, LYOPHILIZED, FOR SOLUTION INTRAMUSCULAR; INTRAVENOUS at 11:15

## 2024-01-15 RX ADMIN — PANTOPRAZOLE SODIUM 40 MILLIGRAM(S): 20 TABLET, DELAYED RELEASE ORAL at 06:08

## 2024-01-15 NOTE — PROGRESS NOTE ADULT - ASSESSMENT
74YO F PMH hypertension and hypothyroidism admitted with Ecoli ESBL bactreremia sec Acute Pyelonephritis  CT abd showed Bilateral pyelonephritis concomitant pyelitis and cystitis.   ESBl E.coli  Repeat Bcx clear    Recommendations:  Continue Ertapenem x10 day course until 1/22  Pt will need midline  Trend temps and cbc    Stable from ID standpoint  D/c planning once midline placed    D/w son      Infectious Diseases will continue to follow. Please call with any questions.   Kim Mac M.D.  [facility] [phone]  For after 5 P.M. and weekends, please call [phone]   74YO F PMH hypertension and hypothyroidism admitted with Ecoli ESBL bactreremia sec Acute Pyelonephritis  CT abd showed Bilateral pyelonephritis concomitant pyelitis and cystitis.   ESBl E.coli  Repeat Bcx clear    Recommendations:  Continue Ertapenem x10 day course until 1/22  Pt will need midline  Trend temps and cbc    Stable from ID standpoint  D/c planning once midline placed    D/w son      Infectious Diseases will continue to follow. Please call with any questions.   Kim Mac M.D.  [facility] [phone]  For after 5 P.M. and weekends, please call [phone]   72YO F PMH hypertension and hypothyroidism admitted with Ecoli ESBL bactreremia sec Acute Pyelonephritis  CT abd showed Bilateral pyelonephritis concomitant pyelitis and cystitis.   ESBl E.coli  Repeat Bcx clear    Recommendations:  Continue Ertapenem x10 day course until 1/22  Pt will need midline  Trend temps and cbc    Stable from ID standpoint  D/c planning once midline placed    D/w son      Infectious Diseases will continue to follow. Please call with any questions.   Kim Mac M.D.  [facility] [phone]  For after 5 P.M. and weekends, please call [phone]

## 2024-01-15 NOTE — PROGRESS NOTE ADULT - SUBJECTIVE AND OBJECTIVE BOX
[facility], Division of Infectious Diseases  LAY Landaverde Y. Patel, S. Shah, G. [facility]  [phone]    Name: PACHECO GASCA  Age: 73y  Gender: Female  MRN: [id number]    Interval History:  Patient seen and examined at bedside  No acute overnight events. Afebrile  Notes reviewed    Antibiotics:  ertapenem  IVPB      ertapenem  IVPB 1000 milliGRAM(s) IV Intermittent every 24 hours      Medications:  acetaminophen     Tablet .. 650 milliGRAM(s) Oral every 6 hours PRN  aluminum hydroxide/magnesium hydroxide/simethicone Suspension 30 milliLiter(s) Oral every 4 hours PRN  amLODIPine   Tablet 10 milliGRAM(s) Oral daily  atorvastatin 10 milliGRAM(s) Oral at bedtime  enoxaparin Injectable 40 milliGRAM(s) SubCutaneous every 24 hours  ertapenem  IVPB      ertapenem  IVPB 1000 milliGRAM(s) IV Intermittent every 24 hours  gabapentin 300 milliGRAM(s) Oral two times a day  hydrALAZINE 10 milliGRAM(s) Oral three times a day PRN  lactobacillus acidophilus 1 Tablet(s) Oral every 12 hours  levothyroxine 50 MICROGram(s) Oral daily  magnesium hydroxide Suspension 30 milliLiter(s) Oral daily PRN  melatonin 3 milliGRAM(s) Oral at bedtime PRN  ondansetron Injectable 4 milliGRAM(s) IV Push every 8 hours PRN  pantoprazole    Tablet 40 milliGRAM(s) Oral before breakfast  propranolol 20 milliGRAM(s) Oral at bedtime  senna 2 Tablet(s) Oral at bedtime  traMADol 50 milliGRAM(s) Oral three times a day PRN  zolpidem 5 milliGRAM(s) Oral at bedtime PRN  zolpidem 5 milliGRAM(s) Oral at bedtime PRN      Review of Systems:  Review of systems otherwise negative except as previously noted.    Allergies: No Known Allergies    For details regarding the patient's past medical history, social history, family history, and other miscellaneous elements, please refer the initial infectious diseases consultation and/or the admitting history and physical examination for this admission.    Objective:  Vitals:   T(C): 36.8 (01-15-24 @ 05:32), Max: 37.3 (01-14-24 @ 17:48)  HR: 78 (01-15-24 @ 05:32) (73 - 78)  BP: 156/80 (01-15-24 @ 05:32) (146/83 - 156/80)  RR: 18 (01-15-24 @ 05:32) (16 - 18)  SpO2: 98% (01-15-24 @ 05:32) (96% - 100%)    Physical Examination:  General: no acute distress  HEENT: NC/AT, EOMI,   Cardio: RRR  Resp: decreased b/l breath sounds  Abd: soft, NT, ND  Ext: no edema or cyanosis  Skin: warm, dry, no visible rash      Laboratory Studies:  CBC:                       12.5   6.20  )-----------( 381      ( 15 Benny 2024 07:57 )             38.1     CMP: 01-15    136  |  100  |  9   ----------------------------<  118<H>  4.3   |  26  |  0.82    Ca    9.0      15 Benny 2024 07:57        Urinalysis Basic - ( 15 Benny 2024 07:57 )    Color: x / Appearance: x / SG: x / pH: x  Gluc: 118 mg/dL / Ketone: x  / Bili: x / Urobili: x   Blood: x / Protein: x / Nitrite: x   Leuk Esterase: x / RBC: x / WBC x   Sq Epi: x / Non Sq Epi: x / Bacteria: x        Microbiology: reviewed    Culture - Blood (collected 01-13-24 @ 16:49)  Source: .Blood Blood-Peripheral  Preliminary Report (01-14-24 @ 22:02):    No growth at 24 hours    Culture - Urine (collected 01-11-24 @ 22:44)  Source: Clean Catch Clean Catch (Midstream)  Preliminary Report (01-13-24 @ 22:53):    10,000 - 49,000 CFU/mL Escherichia coli    Culture - Blood (collected 01-11-24 @ 20:48)  Source: .Blood Blood-Peripheral  Gram Stain (01-15-24 @ 06:06):    Growth in anaerobic bottle: Gram Negative Rods  Preliminary Report (01-15-24 @ 06:06):    Growth in anaerobic bottle: Gram Negative Rods    Culture - Blood (collected 01-11-24 @ 20:48)  Source: .Blood Blood-Peripheral  Gram Stain (01-13-24 @ 00:23):    Growth in aerobic bottle: Gram Negative Rods  Final Report (01-14-24 @ 16:48):    Growth in aerobic bottle: Escherichia coli ESBL    Direct identification is available within approximately 3-5    hours either by Blood Panel Multiplexed PCR or Direct    MALDI-TOF. Details: [email or web address]  Organism: Blood Culture PCR  Escherichia coli ESBL (01-14-24 @ 16:48)  Organism: Escherichia coli ESBL (01-14-24 @ 16:48)      Method Type: DARLENE      -  Ampicillin: R >16 These ampicillin results predict results for amoxicillin      -  Ampicillin/Sulbactam: R >16/8      -  Aztreonam: R >16      -  Cefazolin: R >16      -  Cefepime: R >16      -  Ceftriaxone: R >32      -  Ciprofloxacin: R >2      -  Ertapenem: S <=0.5      -  Gentamicin: S <=2      -  Imipenem: S <=1      -  Levofloxacin: R >4      -  Meropenem: S <=1      -  Piperacillin/Tazobactam: R <=8      -  Tobramycin: R >8      -  Trimethoprim/Sulfamethoxazole: R >2/38  Organism: Blood Culture PCR (01-14-24 @ 16:48)      Method Type: PCR      -  Escherichia coli: Detec      -  ESBL: Detec      -  CTX-M Resistance Marker: Detec          Radiology: reviewed       [facility], Division of Infectious Diseases  LAY Landaverde Y. Patel, S. Shah, G. [facility]  [phone]    Name: PACHECO GASCA  Age: 73y  Gender: Female  MRN: [id number]    Interval History:  Patient seen and examined at bedside  No acute overnight events. Afebrile  Notes reviewed    Antibiotics:  ertapenem  IVPB      ertapenem  IVPB 1000 milliGRAM(s) IV Intermittent every 24 hours      Medications:  acetaminophen     Tablet .. 650 milliGRAM(s) Oral every 6 hours PRN  aluminum hydroxide/magnesium hydroxide/simethicone Suspension 30 milliLiter(s) Oral every 4 hours PRN  amLODIPine   Tablet 10 milliGRAM(s) Oral daily  atorvastatin 10 milliGRAM(s) Oral at bedtime  enoxaparin Injectable 40 milliGRAM(s) SubCutaneous every 24 hours  ertapenem  IVPB      ertapenem  IVPB 1000 milliGRAM(s) IV Intermittent every 24 hours  gabapentin 300 milliGRAM(s) Oral two times a day  hydrALAZINE 10 milliGRAM(s) Oral three times a day PRN  lactobacillus acidophilus 1 Tablet(s) Oral every 12 hours  levothyroxine 50 MICROGram(s) Oral daily  magnesium hydroxide Suspension 30 milliLiter(s) Oral daily PRN  melatonin 3 milliGRAM(s) Oral at bedtime PRN  ondansetron Injectable 4 milliGRAM(s) IV Push every 8 hours PRN  pantoprazole    Tablet 40 milliGRAM(s) Oral before breakfast  propranolol 20 milliGRAM(s) Oral at bedtime  senna 2 Tablet(s) Oral at bedtime  traMADol 50 milliGRAM(s) Oral three times a day PRN  zolpidem 5 milliGRAM(s) Oral at bedtime PRN  zolpidem 5 milliGRAM(s) Oral at bedtime PRN      Review of Systems:  Review of systems otherwise negative except as previously noted.    Allergies: No Known Allergies    For details regarding the patient's past medical history, social history, family history, and other miscellaneous elements, please refer the initial infectious diseases consultation and/or the admitting history and physical examination for this admission.    Objective:  Vitals:   T(C): 36.8 (01-15-24 @ 05:32), Max: 37.3 (01-14-24 @ 17:48)  HR: 78 (01-15-24 @ 05:32) (73 - 78)  BP: 156/80 (01-15-24 @ 05:32) (146/83 - 156/80)  RR: 18 (01-15-24 @ 05:32) (16 - 18)  SpO2: 98% (01-15-24 @ 05:32) (96% - 100%)    Physical Examination:  General: no acute distress  HEENT: NC/AT, EOMI,   Cardio: RRR  Resp: decreased b/l breath sounds  Abd: soft, NT, ND  Ext: no edema or cyanosis  Skin: warm, dry, no visible rash      Laboratory Studies:  CBC:                       12.5   6.20  )-----------( 381      ( 15 Benny 2024 07:57 )             38.1     CMP: 01-15    136  |  100  |  9   ----------------------------<  118<H>  4.3   |  26  |  0.82    Ca    9.0      15 Benny 2024 07:57        Urinalysis Basic - ( 15 Benny 2024 07:57 )    Color: x / Appearance: x / SG: x / pH: x  Gluc: 118 mg/dL / Ketone: x  / Bili: x / Urobili: x   Blood: x / Protein: x / Nitrite: x   Leuk Esterase: x / RBC: x / WBC x   Sq Epi: x / Non Sq Epi: x / Bacteria: x        Microbiology: reviewed    Culture - Blood (collected 01-13-24 @ 16:49)  Source: .Blood Blood-Peripheral  Preliminary Report (01-14-24 @ 22:02):    No growth at 24 hours    Culture - Urine (collected 01-11-24 @ 22:44)  Source: Clean Catch Clean Catch (Midstream)  Preliminary Report (01-13-24 @ 22:53):    10,000 - 49,000 CFU/mL Escherichia coli    Culture - Blood (collected 01-11-24 @ 20:48)  Source: .Blood Blood-Peripheral  Gram Stain (01-15-24 @ 06:06):    Growth in anaerobic bottle: Gram Negative Rods  Preliminary Report (01-15-24 @ 06:06):    Growth in anaerobic bottle: Gram Negative Rods    Culture - Blood (collected 01-11-24 @ 20:48)  Source: .Blood Blood-Peripheral  Gram Stain (01-13-24 @ 00:23):    Growth in aerobic bottle: Gram Negative Rods  Final Report (01-14-24 @ 16:48):    Growth in aerobic bottle: Escherichia coli ESBL    Direct identification is available within approximately 3-5    hours either by Blood Panel Multiplexed PCR or Direct    MALDI-TOF. Details: [email or web address]  Organism: Blood Culture PCR  Escherichia coli ESBL (01-14-24 @ 16:48)  Organism: Escherichia coli ESBL (01-14-24 @ 16:48)      Method Type: DARLENE      -  Ampicillin: R >16 These ampicillin results predict results for amoxicillin      -  Ampicillin/Sulbactam: R >16/8      -  Aztreonam: R >16      -  Cefazolin: R >16      -  Cefepime: R >16      -  Ceftriaxone: R >32      -  Ciprofloxacin: R >2      -  Ertapenem: S <=0.5      -  Gentamicin: S <=2      -  Imipenem: S <=1      -  Levofloxacin: R >4      -  Meropenem: S <=1      -  Piperacillin/Tazobactam: R <=8      -  Tobramycin: R >8      -  Trimethoprim/Sulfamethoxazole: R >2/38  Organism: Blood Culture PCR (01-14-24 @ 16:48)      Method Type: PCR      -  Escherichia coli: Detec      -  ESBL: Detec      -  CTX-M Resistance Marker: Detec          Radiology: reviewed       [facility], Division of Infectious Diseases  LAY Landaverde Y. Patel, S. Shah, G. [facility]  [phone]    Name: PACHECO GASCA  Age: 73y  Gender: Female  MRN: [id number]    Interval History:  Patient seen and examined at bedside  No acute overnight events. Afebrile  Notes reviewed    Antibiotics:  ertapenem  IVPB      ertapenem  IVPB 1000 milliGRAM(s) IV Intermittent every 24 hours      Medications:  acetaminophen     Tablet .. 650 milliGRAM(s) Oral every 6 hours PRN  aluminum hydroxide/magnesium hydroxide/simethicone Suspension 30 milliLiter(s) Oral every 4 hours PRN  amLODIPine   Tablet 10 milliGRAM(s) Oral daily  atorvastatin 10 milliGRAM(s) Oral at bedtime  enoxaparin Injectable 40 milliGRAM(s) SubCutaneous every 24 hours  ertapenem  IVPB      ertapenem  IVPB 1000 milliGRAM(s) IV Intermittent every 24 hours  gabapentin 300 milliGRAM(s) Oral two times a day  hydrALAZINE 10 milliGRAM(s) Oral three times a day PRN  lactobacillus acidophilus 1 Tablet(s) Oral every 12 hours  levothyroxine 50 MICROGram(s) Oral daily  magnesium hydroxide Suspension 30 milliLiter(s) Oral daily PRN  melatonin 3 milliGRAM(s) Oral at bedtime PRN  ondansetron Injectable 4 milliGRAM(s) IV Push every 8 hours PRN  pantoprazole    Tablet 40 milliGRAM(s) Oral before breakfast  propranolol 20 milliGRAM(s) Oral at bedtime  senna 2 Tablet(s) Oral at bedtime  traMADol 50 milliGRAM(s) Oral three times a day PRN  zolpidem 5 milliGRAM(s) Oral at bedtime PRN  zolpidem 5 milliGRAM(s) Oral at bedtime PRN      Review of Systems:  Review of systems otherwise negative except as previously noted.    Allergies: No Known Allergies    For details regarding the patient's past medical history, social history, family history, and other miscellaneous elements, please refer the initial infectious diseases consultation and/or the admitting history and physical examination for this admission.    Objective:  Vitals:   T(C): 36.8 (01-15-24 @ 05:32), Max: 37.3 (01-14-24 @ 17:48)  HR: 78 (01-15-24 @ 05:32) (73 - 78)  BP: 156/80 (01-15-24 @ 05:32) (146/83 - 156/80)  RR: 18 (01-15-24 @ 05:32) (16 - 18)  SpO2: 98% (01-15-24 @ 05:32) (96% - 100%)    Physical Examination:  General: no acute distress  HEENT: NC/AT, EOMI,   Cardio: RRR  Resp: decreased b/l breath sounds  Abd: soft, NT, ND  Ext: no edema or cyanosis  Skin: warm, dry, no visible rash      Laboratory Studies:  CBC:                       12.5   6.20  )-----------( 381      ( 15 Benny 2024 07:57 )             38.1     CMP: 01-15    136  |  100  |  9   ----------------------------<  118<H>  4.3   |  26  |  0.82    Ca    9.0      15 Benny 2024 07:57        Urinalysis Basic - ( 15 Benny 2024 07:57 )    Color: x / Appearance: x / SG: x / pH: x  Gluc: 118 mg/dL / Ketone: x  / Bili: x / Urobili: x   Blood: x / Protein: x / Nitrite: x   Leuk Esterase: x / RBC: x / WBC x   Sq Epi: x / Non Sq Epi: x / Bacteria: x        Microbiology: reviewed    Culture - Blood (collected 01-13-24 @ 16:49)  Source: .Blood Blood-Peripheral  Preliminary Report (01-14-24 @ 22:02):    No growth at 24 hours    Culture - Urine (collected 01-11-24 @ 22:44)  Source: Clean Catch Clean Catch (Midstream)  Preliminary Report (01-13-24 @ 22:53):    10,000 - 49,000 CFU/mL Escherichia coli    Culture - Blood (collected 01-11-24 @ 20:48)  Source: .Blood Blood-Peripheral  Gram Stain (01-15-24 @ 06:06):    Growth in anaerobic bottle: Gram Negative Rods  Preliminary Report (01-15-24 @ 06:06):    Growth in anaerobic bottle: Gram Negative Rods    Culture - Blood (collected 01-11-24 @ 20:48)  Source: .Blood Blood-Peripheral  Gram Stain (01-13-24 @ 00:23):    Growth in aerobic bottle: Gram Negative Rods  Final Report (01-14-24 @ 16:48):    Growth in aerobic bottle: Escherichia coli ESBL    Direct identification is available within approximately 3-5    hours either by Blood Panel Multiplexed PCR or Direct    MALDI-TOF. Details: [email or web address]  Organism: Blood Culture PCR  Escherichia coli ESBL (01-14-24 @ 16:48)  Organism: Escherichia coli ESBL (01-14-24 @ 16:48)      Method Type: DARLENE      -  Ampicillin: R >16 These ampicillin results predict results for amoxicillin      -  Ampicillin/Sulbactam: R >16/8      -  Aztreonam: R >16      -  Cefazolin: R >16      -  Cefepime: R >16      -  Ceftriaxone: R >32      -  Ciprofloxacin: R >2      -  Ertapenem: S <=0.5      -  Gentamicin: S <=2      -  Imipenem: S <=1      -  Levofloxacin: R >4      -  Meropenem: S <=1      -  Piperacillin/Tazobactam: R <=8      -  Tobramycin: R >8      -  Trimethoprim/Sulfamethoxazole: R >2/38  Organism: Blood Culture PCR (01-14-24 @ 16:48)      Method Type: PCR      -  Escherichia coli: Detec      -  ESBL: Detec      -  CTX-M Resistance Marker: Detec          Radiology: reviewed

## 2024-01-15 NOTE — PROGRESS NOTE ADULT - ASSESSMENT
REASON FOR VISIT  .. Management of problems listed below        REVIEW OF SYMPTOMS   Able to give ROS  Yes     RELIABILITY +/-   CONSTITUTIONAL Weakness Yes    ENDOCRINE  No heat or cold intolerance    ALLERGY No hives  Sore throat No stridor  RESP Shortness of breath YES   NEURO New weakness No   CARDIAC   Palpitations No         PHYSICAL EXAM    HEENT Unremarkable  atraumatic   RESP Fair air entry  Harsh breath sound   CARDIAC S1 S2 No S3     NO JVD    ABDOMEN No hepatosplenomegaly   PEDAL EDEMA present No calf tenderness  NO rash       GENERAL DATA .   GOC.  ..  1/12/2024 full code   ICU STAY.  .. no   COVID. .. 1/11 (-)       BEST PRACTICE ISSUES.    HOB ELEVATN.  .. Yes  DVT PPLX.  ..   1/11 lvnx 40         DIET.   ..  1/11 dash       ALLGY. ..     nka  WT. ..   1/11/2024 83  BMI. ..   1/11/2024 31    ABGS.   .  VS/ PO/IO/ VENT/ DRIPS.   1/15/2024 afeb 78 150/80   1/15/2024 ra 98%     PRESENTATION.  . CC 1/11/2024.pain abdomen uti   . HPI 1/11/2024.  . 73 f with pain abdomen several days diffuse pain more l flank with dysuria and fever 101   . Pt was given bactrim for uti at urgent care on 1/9   . PMHx . hytn hypothyroid   . ER MEDS .  . 1/11/2024 8p rocephin   . 1/11/2024 ns 1700     PROBLEM DATA.  INFECTION  . E COLI BACTEREMIA 1/11 ESBL (+)Ctx M (+)   . SEPSIS   . PYELONEPHRITIS   .. W 1/11-1/12-1/13-1/14-1/15/2024 w 11.6-10 - 7.8 - 5.9 - 6.2   .. pr 1/12/2024 pr .3   .. ua 1/11/2024 ua e 9 le mod n (+)   .. cxr 1/12/2024   .... susp rml infiltr/atelectasis   .. CTAP ic 1/11/2024   .... bl pyelonephritis concomitant pyelitis and cystitis   .. bc 1/11 E coli esbl ctx m (+)   .. uc 1/11 10-49 E coli   .. bc 1/14 (-)   .. 1/11-1/13/2024 rocephin  .. 1/13/2024 ertapenem    . RML ATELECTASIS 1/11 CXR     . HEMODYNAMICS  .. la 1/11/2024 la 1   . 1/11/2024 BP ok    . ATELECTASIS   .. CT ch 1/12/2024   .... no consolidatn  .... subsegmental lingular and rml atelectasis     HEMAT  .. Hb 1/11-1/12-1/13-1/15/2024 Hb 12.5 - 11 - 11.2 - 12.5   .. inr 1/11/2024 inr 121     . HYPONATREMIA   .. Na 1/11-1/12-1/13/2024 na 134 - 136- 135  .. K 1/11/2024 K 4  .. Cr 1/11/2024 cr .9     . ELEVATED LFTS   .. ap 1/11-1/13/2024 ap 169 - 139  .. ast 1/11-1/13/2024 ast 30 - 18  .. alt 1/11-1/13/2024 alt 41 - 28     . FAMILY COMMUNICATION  .. 1/14/2024 dw family bedside answered questions   . .    . SUMMARY.     . 73 f PMH Hytn hypothyroid admitted 1/11/2024 with pain abdomen several days diffuse pain more l flank with dysuria and fever 101 CT cw bl pyelonephritis started on rocephin 1/11/2024   . Pulm Crit Care consulted 1/11/2024 for sepsis  . 1/11 bc grew E coli esbl ctx m (+) and escalated to ertapenem      . OVERALL PLAN.  . . E COLI BACTEREMIA 1/11 ESBL (+)Ctx M (+)    . Pyelonephritis  .. Rocephin -> ertapenem   .. 1/14 bc (-)     . RML ATELECTASIS 1/11 CXR   .. CT ch 1/12/2024   .... no consolidatn  .... subsegmental lingular and rml atelectasis   .. 1/13/2024 dw son bedside advised repeat ct 2 m Given my office card     . ACTIVE ISSUE(S).  .. Rx . E COLI BACTEREMIA 1/11 ESBL (+)Ctx M (+)  pyelonephritis with ertapenem started 1/13/2024    TIME SPENT.  . Over 36 minutes aggregate care time spent on encounter; activities included   direct patient care, counseling and/or coordinating care reviewing notes, lab data/ imaging , discussion with multidisciplinary team/ patient  /family and explaining in detail risks, benefits, alternatives  of the recommendations     PATIENT.  . ELO BERGMAN 73 f 1/11/2024 1951 DR EDMOND GASCA       REASON FOR VISIT  .. Management of problems listed below        REVIEW OF SYMPTOMS   Able to give ROS  Yes     RELIABILITY +/-   CONSTITUTIONAL Weakness Yes    ENDOCRINE  No heat or cold intolerance    ALLERGY No hives  Sore throat No stridor  RESP Shortness of breath YES   NEURO New weakness No   CARDIAC   Palpitations No         PHYSICAL EXAM    HEENT Unremarkable  atraumatic   RESP Fair air entry  Harsh breath sound   CARDIAC S1 S2 No S3     NO JVD    ABDOMEN No hepatosplenomegaly   PEDAL EDEMA present No calf tenderness  NO rash       GENERAL DATA .   GOC.  ..  1/12/2024 full code   ICU STAY.  .. no   COVID. .. 1/11 (-)       BEST PRACTICE ISSUES.    HOB ELEVATN.  .. Yes  DVT PPLX.  ..   1/11 lvnx 40         DIET.   ..  1/11 dash       ALLGY. ..     nka  WT. ..   1/11/2024 83  BMI. ..   1/11/2024 31    ABGS.   .  VS/ PO/IO/ VENT/ DRIPS.   1/15/2024 afeb 78 150/80   1/15/2024 ra 98%     PRESENTATION.  . CC 1/11/2024.pain abdomen uti   . HPI 1/11/2024.  . 73 f with pain abdomen several days diffuse pain more l flank with dysuria and fever 101   . Pt was given bactrim for uti at urgent care on 1/9   . PMHx . hytn hypothyroid   . ER MEDS .  . 1/11/2024 8p rocephin   . 1/11/2024 ns 1700     PROBLEM DATA.  INFECTION  . E COLI BACTEREMIA 1/11 ESBL (+)Ctx M (+)   . SEPSIS   . PYELONEPHRITIS   .. W 1/11-1/12-1/13-1/14-1/15/2024 w 11.6-10 - 7.8 - 5.9 - 6.2   .. pr 1/12/2024 pr .3   .. ua 1/11/2024 ua e 9 le mod n (+)   .. cxr 1/12/2024   .... susp rml infiltr/atelectasis   .. CTAP ic 1/11/2024   .... bl pyelonephritis concomitant pyelitis and cystitis   .. bc 1/11 E coli esbl ctx m (+)   .. uc 1/11 10-49 E coli   .. bc 1/14 (-)   .. 1/11-1/13/2024 rocephin  .. 1/13/2024 ertapenem    . RML ATELECTASIS 1/11 CXR     . HEMODYNAMICS  .. la 1/11/2024 la 1   . 1/11/2024 BP ok    . ATELECTASIS   .. CT ch 1/12/2024   .... no consolidatn  .... subsegmental lingular and rml atelectasis     HEMAT  .. Hb 1/11-1/12-1/13-1/15/2024 Hb 12.5 - 11 - 11.2 - 12.5   .. inr 1/11/2024 inr 121     . HYPONATREMIA   .. Na 1/11-1/12-1/13/2024 na 134 - 136- 135  .. K 1/11/2024 K 4  .. Cr 1/11/2024 cr .9     . ELEVATED LFTS   .. ap 1/11-1/13/2024 ap 169 - 139  .. ast 1/11-1/13/2024 ast 30 - 18  .. alt 1/11-1/13/2024 alt 41 - 28     . FAMILY COMMUNICATION  .. 1/14/2024 dw family bedside answered questions   . .    . SUMMARY.     . 73 f PMH Hytn hypothyroid admitted 1/11/2024 with pain abdomen several days diffuse pain more l flank with dysuria and fever 101 CT cw bl pyelonephritis started on rocephin 1/11/2024   . Pulm Crit Care consulted 1/11/2024 for sepsis  . 1/11 bc grew E coli esbl ctx m (+) and escalated to ertapenem      . OVERALL PLAN.  . . E COLI BACTEREMIA 1/11 ESBL (+)Ctx M (+)    . Pyelonephritis  .. Rocephin -> ertapenem   .. 1/14 bc (-)     . RML ATELECTASIS 1/11 CXR   .. CT ch 1/12/2024   .... no consolidatn  .... subsegmental lingular and rml atelectasis   .. 1/13/2024 dw son bedside advised repeat ct 2 m Given my office card     . ACTIVE ISSUE(S).  .. Rx . E COLI BACTEREMIA 1/11 ESBL (+)Ctx M (+)  pyelonephritis with ertapenem started 1/13/2024    TIME SPENT.  . Over 36 minutes aggregate care time spent on encounter; activities included   direct patient care, counseling and/or coordinating care reviewing notes, lab data/ imaging , discussion with multidisciplinary team/ patient  /family and explaining in detail risks, benefits, alternatives  of the recommendations     PATIENT.  . ELO BERGMAN 73 f 1/11/2024 1950 DR EMDOND GASCA

## 2024-01-15 NOTE — PROGRESS NOTE ADULT - SUBJECTIVE AND OBJECTIVE BOX
PROGRESS NOTE  Patient is a 73y old  Female who presents with a chief complaint of dysuria , fever and left flank pain (15 Benny 2024 10:55)      OVERNIGHT      HPI:  Patient is a 73-year-old female with past medical history of hypertension and hypothyroidism presents with abdominal pain for days.  Patient reports diffuse abdominal pain rating to the left flank with dysuria.  Patient reports associated fever 101 Tmax for the past week.  Patient went to urgent care 2 days ago and was diagnosed with a UTI and given Bactrim which patient took 3 doses.  Patient took Tylenol for fever last dose was at 6 PM today.  Patient denies chest pain, shortness of breath, cough, nausea, vomiting, diarrhea, rash. CT abd showed Bilateral pyelonephritis concomitant pyelitis and cystitis. No drainable   fluid collection. Started on iv abx , septic workup sent , ID cons requested  (12 Jan 2024 06:44)    PAST MEDICAL & SURGICAL HISTORY:  HTN (hypertension)      Hypothyroid      Insomnia      GERD (gastroesophageal reflux disease)      HLD (hyperlipidemia)      Neuropathy          MEDICATIONS  (STANDING):  amLODIPine   Tablet 10 milliGRAM(s) Oral daily  atorvastatin 10 milliGRAM(s) Oral at bedtime  enoxaparin Injectable 40 milliGRAM(s) SubCutaneous every 24 hours  ertapenem  IVPB      ertapenem  IVPB 1000 milliGRAM(s) IV Intermittent every 24 hours  gabapentin 300 milliGRAM(s) Oral two times a day  lactobacillus acidophilus 1 Tablet(s) Oral every 12 hours  levothyroxine 50 MICROGram(s) Oral daily  pantoprazole    Tablet 40 milliGRAM(s) Oral before breakfast  propranolol 20 milliGRAM(s) Oral at bedtime  senna 2 Tablet(s) Oral at bedtime    MEDICATIONS  (PRN):  acetaminophen     Tablet .. 650 milliGRAM(s) Oral every 6 hours PRN Temp greater or equal to 38C (100.4F), Mild Pain (1 - 3)  aluminum hydroxide/magnesium hydroxide/simethicone Suspension 30 milliLiter(s) Oral every 4 hours PRN Dyspepsia  hydrALAZINE 10 milliGRAM(s) Oral three times a day PRN Systolic blood pressure >160  magnesium hydroxide Suspension 30 milliLiter(s) Oral daily PRN Constipation  melatonin 3 milliGRAM(s) Oral at bedtime PRN Insomnia  ondansetron Injectable 4 milliGRAM(s) IV Push every 8 hours PRN Nausea and/or Vomiting  traMADol 50 milliGRAM(s) Oral three times a day PRN Moderate Pain (4 - 6)  zolpidem 5 milliGRAM(s) Oral at bedtime PRN Insomnia  zolpidem 5 milliGRAM(s) Oral at bedtime PRN Insomnia      OBJECTIVE    T(C): 36.8 (01-15-24 @ 05:32), Max: 37.3 (01-14-24 @ 17:48)  HR: 78 (01-15-24 @ 05:32) (73 - 78)  BP: 156/80 (01-15-24 @ 05:32) (146/83 - 156/80)  RR: 18 (01-15-24 @ 05:32) (16 - 18)  SpO2: 98% (01-15-24 @ 05:32) (96% - 100%)  Wt(kg): --  I&O's Summary        REVIEW OF SYSTEMS:  CONSTITUTIONAL: No fever, weight loss, or fatigue  EYES: No eye pain, visual disturbances, or discharge  ENMT:   No sinus or throat pain  NECK: No pain or stiffness  RESPIRATORY: No cough, wheezing, chills or hemoptysis; No shortness of breath  CARDIOVASCULAR: No chest pain, palpitations, dizziness, or leg swelling  GASTROINTESTINAL: No abdominal pain. No nausea, vomiting; No diarrhea or constipation. No melena or hematochezia.  GENITOURINARY: No dysuria, frequency, hematuria, or incontinence  NEUROLOGICAL: No headaches, memory loss, loss of strength, numbness, or tremors  SKIN: No itching, burning, rashes, or lesions   MUSCULOSKELETAL: No joint pain or swelling; No muscle, back, or extremity pain    PHYSICAL EXAM:  Appearance: NAD. VS past 24 hrs -as above   HEENT:   Moist oral mucosa. Conjunctiva clear b/l.   Neck : supple  Respiratory: Lungs CTAB.  Gastrointestinal:  Soft, nontender. No rebound. No rigidity. BS present	  Cardiovascular: RRR ,S1S2 present  Neurologic: Non-focal. Moving all extremities.  Extremities: No edema. No erythema. No calf tenderness.  Skin: No rashes, No ecchymoses, No cyanosis.	  wounds ,skin lesions-See skin assesment flow sheet   LABS:                        12.5   6.20  )-----------( 381      ( 15 Benny 2024 07:57 )             38.1     01-15    136  |  100  |  9   ----------------------------<  118<H>  4.3   |  26  |  0.82    Ca    9.0      15 Benny 2024 07:57      CAPILLARY BLOOD GLUCOSE          Urinalysis Basic - ( 15 Benny 2024 07:57 )    Color: x / Appearance: x / SG: x / pH: x  Gluc: 118 mg/dL / Ketone: x  / Bili: x / Urobili: x   Blood: x / Protein: x / Nitrite: x   Leuk Esterase: x / RBC: x / WBC x   Sq Epi: x / Non Sq Epi: x / Bacteria: x        Culture - Blood (collected 13 Jan 2024 16:49)  Source: .Blood Blood-Peripheral  Preliminary Report (14 Jan 2024 22:02):    No growth at 24 hours    Culture - Urine (collected 11 Jan 2024 22:44)  Source: Clean Catch Clean Catch (Midstream)  Final Report (15 Benny 2024 11:02):    10,000 - 49,000 CFU/mL Escherichia coli ESBL  Organism: Escherichia coli ESBL (15 Benny 2024 11:02)  Organism: Escherichia coli ESBL (15 Benny 2024 11:02)    Culture - Blood (collected 11 Jan 2024 20:48)  Source: .Blood Blood-Peripheral  Gram Stain (15 Benny 2024 06:06):    Growth in anaerobic bottle: Gram Negative Rods  Preliminary Report (15 Benny 2024 06:06):    Growth in anaerobic bottle: Gram Negative Rods    Culture - Blood (collected 11 Jan 2024 20:48)  Source: .Blood Blood-Peripheral  Gram Stain (13 Jan 2024 00:23):    Growth in aerobic bottle: Gram Negative Rods  Final Report (14 Jan 2024 16:48):    Growth in aerobic bottle: Escherichia coli ESBL    Direct identification is available within approximately 3-5    hours either by Blood Panel Multiplexed PCR or Direct    MALDI-TOF. Details: [email or web address]  Organism: Blood Culture PCR  Escherichia coli ESBL (14 Jan 2024 16:48)  Organism: Escherichia coli ESBL (14 Jan 2024 16:48)  Organism: Blood Culture PCR (14 Jan 2024 16:48)      RADIOLOGY & ADDITIONAL TESTS:   reviewed elctronically  ASSESSMENT/PLAN: 	     PROGRESS NOTE  Patient is a 73y old  Female who presents with a chief complaint of dysuria , fever and left flank pain (15 Benny 2024 10:55)      OVERNIGHT      HPI:  Patient is a 73-year-old female with past medical history of hypertension and hypothyroidism presents with abdominal pain for days.  Patient reports diffuse abdominal pain rating to the left flank with dysuria.  Patient reports associated fever 101 Tmax for the past week.  Patient went to urgent care 2 days ago and was diagnosed with a UTI and given Bactrim which patient took 3 doses.  Patient took Tylenol for fever last dose was at 6 PM today.  Patient denies chest pain, shortness of breath, cough, nausea, vomiting, diarrhea, rash. CT abd showed Bilateral pyelonephritis concomitant pyelitis and cystitis. No drainable   fluid collection. Started on iv abx , septic workup sent , ID cons requested  (12 Jan 2024 06:44)    PAST MEDICAL & SURGICAL HISTORY:  HTN (hypertension)      Hypothyroid      Insomnia      GERD (gastroesophageal reflux disease)      HLD (hyperlipidemia)      Neuropathy          MEDICATIONS  (STANDING):  amLODIPine   Tablet 10 milliGRAM(s) Oral daily  atorvastatin 10 milliGRAM(s) Oral at bedtime  enoxaparin Injectable 40 milliGRAM(s) SubCutaneous every 24 hours  ertapenem  IVPB      ertapenem  IVPB 1000 milliGRAM(s) IV Intermittent every 24 hours  gabapentin 300 milliGRAM(s) Oral two times a day  lactobacillus acidophilus 1 Tablet(s) Oral every 12 hours  levothyroxine 50 MICROGram(s) Oral daily  pantoprazole    Tablet 40 milliGRAM(s) Oral before breakfast  propranolol 20 milliGRAM(s) Oral at bedtime  senna 2 Tablet(s) Oral at bedtime    MEDICATIONS  (PRN):  acetaminophen     Tablet .. 650 milliGRAM(s) Oral every 6 hours PRN Temp greater or equal to 38C (100.4F), Mild Pain (1 - 3)  aluminum hydroxide/magnesium hydroxide/simethicone Suspension 30 milliLiter(s) Oral every 4 hours PRN Dyspepsia  hydrALAZINE 10 milliGRAM(s) Oral three times a day PRN Systolic blood pressure >160  magnesium hydroxide Suspension 30 milliLiter(s) Oral daily PRN Constipation  melatonin 3 milliGRAM(s) Oral at bedtime PRN Insomnia  ondansetron Injectable 4 milliGRAM(s) IV Push every 8 hours PRN Nausea and/or Vomiting  traMADol 50 milliGRAM(s) Oral three times a day PRN Moderate Pain (4 - 6)  zolpidem 5 milliGRAM(s) Oral at bedtime PRN Insomnia  zolpidem 5 milliGRAM(s) Oral at bedtime PRN Insomnia      OBJECTIVE    T(C): 36.8 (01-15-24 @ 05:32), Max: 37.3 (01-14-24 @ 17:48)  HR: 78 (01-15-24 @ 05:32) (73 - 78)  BP: 156/80 (01-15-24 @ 05:32) (146/83 - 156/80)  RR: 18 (01-15-24 @ 05:32) (16 - 18)  SpO2: 98% (01-15-24 @ 05:32) (96% - 100%)  Wt(kg): --  I&O's Summary        REVIEW OF SYSTEMS:  CONSTITUTIONAL: No fever, weight loss, or fatigue  EYES: No eye pain, visual disturbances, or discharge  ENMT:   No sinus or throat pain  NECK: No pain or stiffness  RESPIRATORY: No cough, wheezing, chills or hemoptysis; No shortness of breath  CARDIOVASCULAR: No chest pain, palpitations, dizziness, or leg swelling  GASTROINTESTINAL: No abdominal pain. No nausea, vomiting; No diarrhea or constipation. No melena or hematochezia.  GENITOURINARY: No dysuria, frequency, hematuria, or incontinence  NEUROLOGICAL: No headaches, memory loss, loss of strength, numbness, or tremors  SKIN: No itching, burning, rashes, or lesions   MUSCULOSKELETAL: No joint pain or swelling; No muscle, back, or extremity pain    PHYSICAL EXAM:  Appearance: NAD. VS past 24 hrs -as above   HEENT:   Moist oral mucosa. Conjunctiva clear b/l.   Neck : supple  Respiratory: Lungs CTAB.  Gastrointestinal:  Soft, nontender. No rebound. No rigidity. BS present	  Cardiovascular: RRR ,S1S2 present  Neurologic: Non-focal. Moving all extremities.  Extremities: No edema. No erythema. No calf tenderness.  Skin: No rashes, No ecchymoses, No cyanosis.	  wounds ,skin lesions-See skin assesment flow sheet   LABS:                        12.5   6.20  )-----------( 381      ( 15 Benny 2024 07:57 )             38.1     01-15    136  |  100  |  9   ----------------------------<  118<H>  4.3   |  26  |  0.82    Ca    9.0      15 Benny 2024 07:57      CAPILLARY BLOOD GLUCOSE          Urinalysis Basic - ( 15 Benny 2024 07:57 )    Color: x / Appearance: x / SG: x / pH: x  Gluc: 118 mg/dL / Ketone: x  / Bili: x / Urobili: x   Blood: x / Protein: x / Nitrite: x   Leuk Esterase: x / RBC: x / WBC x   Sq Epi: x / Non Sq Epi: x / Bacteria: x        Culture - Blood (collected 13 Jan 2024 16:49)  Source: .Blood Blood-Peripheral  Preliminary Report (14 Jan 2024 22:02):    No growth at 24 hours    Culture - Urine (collected 11 Jan 2024 22:44)  Source: Clean Catch Clean Catch (Midstream)  Final Report (15 Benny 2024 11:02):    10,000 - 49,000 CFU/mL Escherichia coli ESBL  Organism: Escherichia coli ESBL (15 Benny 2024 11:02)  Organism: Escherichia coli ESBL (15 Benny 2024 11:02)    Culture - Blood (collected 11 Jan 2024 20:48)  Source: .Blood Blood-Peripheral  Gram Stain (15 Benny 2024 06:06):    Growth in anaerobic bottle: Gram Negative Rods  Preliminary Report (15 Benny 2024 06:06):    Growth in anaerobic bottle: Gram Negative Rods    Culture - Blood (collected 11 Jan 2024 20:48)  Source: .Blood Blood-Peripheral  Gram Stain (13 Jan 2024 00:23):    Growth in aerobic bottle: Gram Negative Rods  Final Report (14 Jan 2024 16:48):    Growth in aerobic bottle: Escherichia coli ESBL    Direct identification is available within approximately 3-5    hours either by Blood Panel Multiplexed PCR or Direct    MALDI-TOF. Details: [email or web address]  Organism: Blood Culture PCR  Escherichia coli ESBL (14 Jan 2024 16:48)  Organism: Escherichia coli ESBL (14 Jan 2024 16:48)  Organism: Blood Culture PCR (14 Jan 2024 16:48)      RADIOLOGY & ADDITIONAL TESTS:   reviewed elctronically  ASSESSMENT/PLAN: 	     PROGRESS NOTE  Patient is a 73y old  Female who presents with a chief complaint of dysuria , fever and left flank pain (15 Benny 2024 10:55)      OVERNIGHT      HPI:  Patient is a 73-year-old female with past medical history of hypertension and hypothyroidism presents with abdominal pain for days.  Patient reports diffuse abdominal pain rating to the left flank with dysuria.  Patient reports associated fever 101 Tmax for the past week.  Patient went to urgent care 2 days ago and was diagnosed with a UTI and given Bactrim which patient took 3 doses.  Patient took Tylenol for fever last dose was at 6 PM today.  Patient denies chest pain, shortness of breath, cough, nausea, vomiting, diarrhea, rash. CT abd showed Bilateral pyelonephritis concomitant pyelitis and cystitis. No drainable   fluid collection. Started on iv abx , septic workup sent , ID cons requested  (12 Jan 2024 06:44)    PAST MEDICAL & SURGICAL HISTORY:  HTN (hypertension)      Hypothyroid      Insomnia      GERD (gastroesophageal reflux disease)      HLD (hyperlipidemia)      Neuropathy          MEDICATIONS  (STANDING):  amLODIPine   Tablet 10 milliGRAM(s) Oral daily  atorvastatin 10 milliGRAM(s) Oral at bedtime  enoxaparin Injectable 40 milliGRAM(s) SubCutaneous every 24 hours  ertapenem  IVPB      ertapenem  IVPB 1000 milliGRAM(s) IV Intermittent every 24 hours  gabapentin 300 milliGRAM(s) Oral two times a day  lactobacillus acidophilus 1 Tablet(s) Oral every 12 hours  levothyroxine 50 MICROGram(s) Oral daily  pantoprazole    Tablet 40 milliGRAM(s) Oral before breakfast  propranolol 20 milliGRAM(s) Oral at bedtime  senna 2 Tablet(s) Oral at bedtime    MEDICATIONS  (PRN):  acetaminophen     Tablet .. 650 milliGRAM(s) Oral every 6 hours PRN Temp greater or equal to 38C (100.4F), Mild Pain (1 - 3)  aluminum hydroxide/magnesium hydroxide/simethicone Suspension 30 milliLiter(s) Oral every 4 hours PRN Dyspepsia  hydrALAZINE 10 milliGRAM(s) Oral three times a day PRN Systolic blood pressure >160  magnesium hydroxide Suspension 30 milliLiter(s) Oral daily PRN Constipation  melatonin 3 milliGRAM(s) Oral at bedtime PRN Insomnia  ondansetron Injectable 4 milliGRAM(s) IV Push every 8 hours PRN Nausea and/or Vomiting  traMADol 50 milliGRAM(s) Oral three times a day PRN Moderate Pain (4 - 6)  zolpidem 5 milliGRAM(s) Oral at bedtime PRN Insomnia  zolpidem 5 milliGRAM(s) Oral at bedtime PRN Insomnia      OBJECTIVE    T(C): 36.8 (01-15-24 @ 05:32), Max: 37.3 (01-14-24 @ 17:48)  HR: 78 (01-15-24 @ 05:32) (73 - 78)  BP: 156/80 (01-15-24 @ 05:32) (146/83 - 156/80)  RR: 18 (01-15-24 @ 05:32) (16 - 18)  SpO2: 98% (01-15-24 @ 05:32) (96% - 100%)  Wt(kg): --  I&O's Summary        REVIEW OF SYSTEMS:  CONSTITUTIONAL: No fever, weight loss, or fatigue  EYES: No eye pain, visual disturbances, or discharge  ENMT:   No sinus or throat pain  NECK: No pain or stiffness  RESPIRATORY: No cough, wheezing, chills or hemoptysis; No shortness of breath  CARDIOVASCULAR: No chest pain, palpitations, dizziness, or leg swelling  GASTROINTESTINAL: No abdominal pain. No nausea, vomiting; No diarrhea or constipation. No melena or hematochezia.  GENITOURINARY: No dysuria, frequency, hematuria, or incontinence  NEUROLOGICAL: No headaches, memory loss, loss of strength, numbness, or tremors  SKIN: No itching, burning, rashes, or lesions   MUSCULOSKELETAL: No joint pain or swelling; No muscle, back, or extremity pain    PHYSICAL EXAM:  Appearance: NAD. VS past 24 hrs -as above   HEENT:   Moist oral mucosa. Conjunctiva clear b/l.   Neck : supple  Respiratory: Lungs CTAB.  Gastrointestinal:  Soft, nontender. No rebound. No rigidity. BS present	  Cardiovascular: RRR ,S1S2 present  Neurologic: Non-focal. Moving all extremities.  Extremities: No edema. No erythema. No calf tenderness.  Skin: No rashes, No ecchymoses, No cyanosis.	  wounds ,skin lesions-See skin assesment flow sheet   LABS:                        12.5   6.20  )-----------( 381      ( 15 Benny 2024 07:57 )             38.1     01-15    136  |  100  |  9   ----------------------------<  118<H>  4.3   |  26  |  0.82    Ca    9.0      15 Benny 2024 07:57      CAPILLARY BLOOD GLUCOSE          Urinalysis Basic - ( 15 Benny 2024 07:57 )    Color: x / Appearance: x / SG: x / pH: x  Gluc: 118 mg/dL / Ketone: x  / Bili: x / Urobili: x   Blood: x / Protein: x / Nitrite: x   Leuk Esterase: x / RBC: x / WBC x   Sq Epi: x / Non Sq Epi: x / Bacteria: x        Culture - Blood (collected 13 Jan 2024 16:49)  Source: .Blood Blood-Peripheral  Preliminary Report (14 Jan 2024 22:02):    No growth at 24 hours    Culture - Urine (collected 11 Jan 2024 22:44)  Source: Clean Catch Clean Catch (Midstream)  Final Report (15 Benny 2024 11:02):    10,000 - 49,000 CFU/mL Escherichia coli ESBL  Organism: Escherichia coli ESBL (15 Benny 2024 11:02)  Organism: Escherichia coli ESBL (15 Benny 2024 11:02)    Culture - Blood (collected 11 Jan 2024 20:48)  Source: .Blood Blood-Peripheral  Gram Stain (15 Benny 2024 06:06):    Growth in anaerobic bottle: Gram Negative Rods  Preliminary Report (15 Benny 2024 06:06):    Growth in anaerobic bottle: Gram Negative Rods    Culture - Blood (collected 11 Jan 2024 20:48)  Source: .Blood Blood-Peripheral  Gram Stain (13 Jan 2024 00:23):    Growth in aerobic bottle: Gram Negative Rods  Final Report (14 Jan 2024 16:48):    Growth in aerobic bottle: Escherichia coli ESBL    Direct identification is available within approximately 3-5    hours either by Blood Panel Multiplexed PCR or Direct    MALDI-TOF. Details: [email or web address]  Organism: Blood Culture PCR  Escherichia coli ESBL (14 Jan 2024 16:48)  Organism: Escherichia coli ESBL (14 Jan 2024 16:48)  Organism: Blood Culture PCR (14 Jan 2024 16:48)      RADIOLOGY & ADDITIONAL TESTS:   reviewed elctronically  ASSESSMENT/PLAN: 	     PROGRESS NOTE  Patient is a 73y old  Female who presents with a chief complaint of dysuria , fever and left flank pain (15 Benny 2024 10:55)    Chart and available morning labs /imaging are reviewed electronically , urgent issues addressed . More information  is being added upon completion of rounds , when more information is collected and management discussed with consultants , medical staff and social service/case management on the floor   OVERNIGHT  No new issues reported by medical staff . All above noted Patient is resting in a bed comfortably . .No distress noted     HPI:  Patient is a 73-year-old female with past medical history of hypertension and hypothyroidism presents with abdominal pain for days.  Patient reports diffuse abdominal pain rating to the left flank with dysuria.  Patient reports associated fever 101 Tmax for the past week.  Patient went to urgent care 2 days ago and was diagnosed with a UTI and given Bactrim which patient took 3 doses.  Patient took Tylenol for fever last dose was at 6 PM today.  Patient denies chest pain, shortness of breath, cough, nausea, vomiting, diarrhea, rash. CT abd showed Bilateral pyelonephritis concomitant pyelitis and cystitis. No drainable   fluid collection. Started on iv abx , septic workup sent , ID cons requested  (12 Jan 2024 06:44)    PAST MEDICAL & SURGICAL HISTORY:  HTN (hypertension)      Hypothyroid      Insomnia      GERD (gastroesophageal reflux disease)      HLD (hyperlipidemia)      Neuropathy          MEDICATIONS  (STANDING):  amLODIPine   Tablet 10 milliGRAM(s) Oral daily  atorvastatin 10 milliGRAM(s) Oral at bedtime  enoxaparin Injectable 40 milliGRAM(s) SubCutaneous every 24 hours  ertapenem  IVPB      ertapenem  IVPB 1000 milliGRAM(s) IV Intermittent every 24 hours  gabapentin 300 milliGRAM(s) Oral two times a day  lactobacillus acidophilus 1 Tablet(s) Oral every 12 hours  levothyroxine 50 MICROGram(s) Oral daily  pantoprazole    Tablet 40 milliGRAM(s) Oral before breakfast  propranolol 20 milliGRAM(s) Oral at bedtime  senna 2 Tablet(s) Oral at bedtime    MEDICATIONS  (PRN):  acetaminophen     Tablet .. 650 milliGRAM(s) Oral every 6 hours PRN Temp greater or equal to 38C (100.4F), Mild Pain (1 - 3)  aluminum hydroxide/magnesium hydroxide/simethicone Suspension 30 milliLiter(s) Oral every 4 hours PRN Dyspepsia  hydrALAZINE 10 milliGRAM(s) Oral three times a day PRN Systolic blood pressure >160  magnesium hydroxide Suspension 30 milliLiter(s) Oral daily PRN Constipation  melatonin 3 milliGRAM(s) Oral at bedtime PRN Insomnia  ondansetron Injectable 4 milliGRAM(s) IV Push every 8 hours PRN Nausea and/or Vomiting  traMADol 50 milliGRAM(s) Oral three times a day PRN Moderate Pain (4 - 6)  zolpidem 5 milliGRAM(s) Oral at bedtime PRN Insomnia  zolpidem 5 milliGRAM(s) Oral at bedtime PRN Insomnia      OBJECTIVE    T(C): 36.8 (01-15-24 @ 05:32), Max: 37.3 (01-14-24 @ 17:48)  HR: 78 (01-15-24 @ 05:32) (73 - 78)  BP: 156/80 (01-15-24 @ 05:32) (146/83 - 156/80)  RR: 18 (01-15-24 @ 05:32) (16 - 18)  SpO2: 98% (01-15-24 @ 05:32) (96% - 100%)  Wt(kg): --  I&O's Summary        REVIEW OF SYSTEMS:  CONSTITUTIONAL: No fever, weight loss, or fatigue  EYES: No eye pain, visual disturbances, or discharge  ENMT:   No sinus or throat pain  NECK: No pain or stiffness  RESPIRATORY: No cough, wheezing, chills or hemoptysis; No shortness of breath  CARDIOVASCULAR: No chest pain, palpitations, dizziness, or leg swelling  GASTROINTESTINAL: No abdominal pain. No nausea, vomiting; No diarrhea or constipation. No melena or hematochezia.  GENITOURINARY: No dysuria, frequency, hematuria, or incontinence  NEUROLOGICAL: No headaches, memory loss, loss of strength, numbness, or tremors  SKIN: No itching, burning, rashes, or lesions   MUSCULOSKELETAL: No joint pain or swelling; No muscle, back, or extremity pain    PHYSICAL EXAM:  Appearance: NAD. VS past 24 hrs -as above   HEENT:   Moist oral mucosa. Conjunctiva clear b/l.   Neck : supple  Respiratory: Lungs CTAB.  Gastrointestinal:  Soft, nontender. No rebound. No rigidity. BS present	  Cardiovascular: RRR ,S1S2 present  Neurologic: Non-focal. Moving all extremities.  Extremities: No edema. No erythema. No calf tenderness.  Skin: No rashes, No ecchymoses, No cyanosis.	  wounds ,skin lesions-See skin assesment flow sheet   LABS:                        12.5   6.20  )-----------( 381      ( 15 Benny 2024 07:57 )             38.1     01-15    136  |  100  |  9   ----------------------------<  118<H>  4.3   |  26  |  0.82    Ca    9.0      15 Benny 2024 07:57      CAPILLARY BLOOD GLUCOSE          Urinalysis Basic - ( 15 Benny 2024 07:57 )    Color: x / Appearance: x / SG: x / pH: x  Gluc: 118 mg/dL / Ketone: x  / Bili: x / Urobili: x   Blood: x / Protein: x / Nitrite: x   Leuk Esterase: x / RBC: x / WBC x   Sq Epi: x / Non Sq Epi: x / Bacteria: x        Culture - Blood (collected 13 Jan 2024 16:49)  Source: .Blood Blood-Peripheral  Preliminary Report (14 Jan 2024 22:02):    No growth at 24 hours    Culture - Urine (collected 11 Jan 2024 22:44)  Source: Clean Catch Clean Catch (Midstream)  Final Report (15 Benny 2024 11:02):    10,000 - 49,000 CFU/mL Escherichia coli ESBL  Organism: Escherichia coli ESBL (15 Benny 2024 11:02)  Organism: Escherichia coli ESBL (15 Benny 2024 11:02)    Culture - Blood (collected 11 Jan 2024 20:48)  Source: .Blood Blood-Peripheral  Gram Stain (15 Benny 2024 06:06):    Growth in anaerobic bottle: Gram Negative Rods  Preliminary Report (15 Benny 2024 06:06):    Growth in anaerobic bottle: Gram Negative Rods    Culture - Blood (collected 11 Jan 2024 20:48)  Source: .Blood Blood-Peripheral  Gram Stain (13 Jan 2024 00:23):    Growth in aerobic bottle: Gram Negative Rods  Final Report (14 Jan 2024 16:48):    Growth in aerobic bottle: Escherichia coli ESBL    Direct identification is available within approximately 3-5    hours either by Blood Panel Multiplexed PCR or Direct    MALDI-TOF. Details: [email or web address]  Organism: Blood Culture PCR  Escherichia coli ESBL (14 Jan 2024 16:48)  Organism: Escherichia coli ESBL (14 Jan 2024 16:48)  Organism: Blood Culture PCR (14 Jan 2024 16:48)      RADIOLOGY & ADDITIONAL TESTS:   reviewed elctronically  ASSESSMENT/PLAN: 	    25 minutes aggregate time was spent on this visit, 50% visit time spent in care co-ordination with other attendings and counselling patient .I have discussed care plan with patient / HCP/family member ,who expressed understanding of problems treatment and their effect and side effects, alternatives in details. I have asked if they have any questions and concerns and appropriately addressed them to best of my ability.  PROGRESS NOTE  Patient is a 73y old  Female who presents with a chief complaint of dysuria , fever and left flank pain (15 Benny 2024 10:55)    Chart and available morning labs /imaging are reviewed electronically , urgent issues addressed . More information  is being added upon completion of rounds , when more information is collected and management discussed with consultants , medical staff and social service/case management on the floor   OVERNIGHT  No new issues reported by medical staff . All above noted Patient is resting in a bed comfortably . .No distress noted     HPI:  Patient is a 73-year-old female with past medical history of hypertension and hypothyroidism presents with abdominal pain for days.  Patient reports diffuse abdominal pain rating to the left flank with dysuria.  Patient reports associated fever 101 Tmax for the past week.  Patient went to urgent care 2 days ago and was diagnosed with a UTI and given Bactrim which patient took 3 doses.  Patient took Tylenol for fever last dose was at 6 PM today.  Patient denies chest pain, shortness of breath, cough, nausea, vomiting, diarrhea, rash. CT abd showed Bilateral pyelonephritis concomitant pyelitis and cystitis. No drainable   fluid collection. Started on iv abx , septic workup sent , ID cons requested  (12 Jan 2024 06:44)    PAST MEDICAL & SURGICAL HISTORY:  HTN (hypertension)      Hypothyroid      Insomnia      GERD (gastroesophageal reflux disease)      HLD (hyperlipidemia)      Neuropathy          MEDICATIONS  (STANDING):  amLODIPine   Tablet 10 milliGRAM(s) Oral daily  atorvastatin 10 milliGRAM(s) Oral at bedtime  enoxaparin Injectable 40 milliGRAM(s) SubCutaneous every 24 hours  ertapenem  IVPB      ertapenem  IVPB 1000 milliGRAM(s) IV Intermittent every 24 hours  gabapentin 300 milliGRAM(s) Oral two times a day  lactobacillus acidophilus 1 Tablet(s) Oral every 12 hours  levothyroxine 50 MICROGram(s) Oral daily  pantoprazole    Tablet 40 milliGRAM(s) Oral before breakfast  propranolol 20 milliGRAM(s) Oral at bedtime  senna 2 Tablet(s) Oral at bedtime    MEDICATIONS  (PRN):  acetaminophen     Tablet .. 650 milliGRAM(s) Oral every 6 hours PRN Temp greater or equal to 38C (100.4F), Mild Pain (1 - 3)  aluminum hydroxide/magnesium hydroxide/simethicone Suspension 30 milliLiter(s) Oral every 4 hours PRN Dyspepsia  hydrALAZINE 10 milliGRAM(s) Oral three times a day PRN Systolic blood pressure >160  magnesium hydroxide Suspension 30 milliLiter(s) Oral daily PRN Constipation  melatonin 3 milliGRAM(s) Oral at bedtime PRN Insomnia  ondansetron Injectable 4 milliGRAM(s) IV Push every 8 hours PRN Nausea and/or Vomiting  traMADol 50 milliGRAM(s) Oral three times a day PRN Moderate Pain (4 - 6)  zolpidem 5 milliGRAM(s) Oral at bedtime PRN Insomnia  zolpidem 5 milliGRAM(s) Oral at bedtime PRN Insomnia      OBJECTIVE    T(C): 36.8 (01-15-24 @ 05:32), Max: 37.3 (01-14-24 @ 17:48)  HR: 78 (01-15-24 @ 05:32) (73 - 78)  BP: 156/80 (01-15-24 @ 05:32) (146/83 - 156/80)  RR: 18 (01-15-24 @ 05:32) (16 - 18)  SpO2: 98% (01-15-24 @ 05:32) (96% - 100%)  Wt(kg): --  I&O's Summary        REVIEW OF SYSTEMS:  CONSTITUTIONAL: No fever, weight loss, or fatigue  EYES: No eye pain, visual disturbances, or discharge  ENMT:   No sinus or throat pain  NECK: No pain or stiffness  RESPIRATORY: No cough, wheezing, chills or hemoptysis; No shortness of breath  CARDIOVASCULAR: No chest pain, palpitations, dizziness, or leg swelling  GASTROINTESTINAL: No abdominal pain. No nausea, vomiting; No diarrhea or constipation. No melena or hematochezia.  GENITOURINARY: No dysuria, frequency, hematuria, or incontinence  NEUROLOGICAL: No headaches, memory loss, loss of strength, numbness, or tremors  SKIN: No itching, burning, rashes, or lesions   MUSCULOSKELETAL: No joint pain or swelling; No muscle, back, or extremity pain    PHYSICAL EXAM:  Appearance: NAD. VS past 24 hrs -as above   HEENT:   Moist oral mucosa. Conjunctiva clear b/l.   Neck : supple  Respiratory: Lungs CTAB.  Gastrointestinal:  Soft, nontender. No rebound. No rigidity. BS present	  Cardiovascular: RRR ,S1S2 present  Neurologic: Non-focal. Moving all extremities.  Extremities: No edema. No erythema. No calf tenderness.  Skin: No rashes, No ecchymoses, No cyanosis.	  wounds ,skin lesions-See skin assesment flow sheet   LABS:                        12.5   6.20  )-----------( 381      ( 15 Benny 2024 07:57 )             38.1     01-15    136  |  100  |  9   ----------------------------<  118<H>  4.3   |  26  |  0.82    Ca    9.0      15 Benny 2024 07:57      CAPILLARY BLOOD GLUCOSE          Urinalysis Basic - ( 15 Benny 2024 07:57 )    Color: x / Appearance: x / SG: x / pH: x  Gluc: 118 mg/dL / Ketone: x  / Bili: x / Urobili: x   Blood: x / Protein: x / Nitrite: x   Leuk Esterase: x / RBC: x / WBC x   Sq Epi: x / Non Sq Epi: x / Bacteria: x        Culture - Blood (collected 13 Jan 2024 16:49)  Source: .Blood Blood-Peripheral  Preliminary Report (14 Jan 2024 22:02):    No growth at 24 hours    Culture - Urine (collected 11 Jan 2024 22:44)  Source: Clean Catch Clean Catch (Midstream)  Final Report (15 Benny 2024 11:02):    10,000 - 49,000 CFU/mL Escherichia coli ESBL  Organism: Escherichia coli ESBL (15 Benny 2024 11:02)  Organism: Escherichia coli ESBL (15 Benny 2024 11:02)    Culture - Blood (collected 11 Jan 2024 20:48)  Source: .Blood Blood-Peripheral  Gram Stain (15 Benny 2024 06:06):    Growth in anaerobic bottle: Gram Negative Rods  Preliminary Report (15 Benny 2024 06:06):    Growth in anaerobic bottle: Gram Negative Rods    Culture - Blood (collected 11 Jan 2024 20:48)  Source: .Blood Blood-Peripheral  Gram Stain (13 Jan 2024 00:23):    Growth in aerobic bottle: Gram Negative Rods  Final Report (14 Jan 2024 16:48):    Growth in aerobic bottle: Escherichia coli ESBL    Direct identification is available within approximately 3-5    hours either by Blood Panel Multiplexed PCR or Direct    MALDI-TOF. Details: [email or web address]  Organism: Blood Culture PCR  Escherichia coli ESBL (14 Jan 2024 16:48)  Organism: Escherichia coli ESBL (14 Jan 2024 16:48)  Organism: Blood Culture PCR (14 Jan 2024 16:48)      RADIOLOGY & ADDITIONAL TESTS:   reviewed elctronically  ASSESSMENT/PLAN: 	    25 minutes aggregate time was spent on this visit, 50% visit time spent in care co-ordination with other attendings and counselling patient .I have discussed care plan with patient / HCP/family member ,who expressed understanding of problems treatment and their effect and side effects, alternatives in details. I have asked if they have any questions and concerns and appropriately addressed them to best of my ability.  PROGRESS NOTE  Patient is a 73y old  Female who presents with a chief complaint of dysuria , fever and left flank pain (15 Benny 2024 10:55)    Chart and available morning labs /imaging are reviewed electronically , urgent issues addressed . More information  is being added upon completion of rounds , when more information is collected and management discussed with consultants , medical staff and social service/case management on the floor   OVERNIGHT  No new issues reported by medical staff . All above noted Patient is resting in a bed comfortably . .No distress noted     HPI:  Patient is a 73-year-old female with past medical history of hypertension and hypothyroidism presents with abdominal pain for days.  Patient reports diffuse abdominal pain rating to the left flank with dysuria.  Patient reports associated fever 101 Tmax for the past week.  Patient went to urgent care 2 days ago and was diagnosed with a UTI and given Bactrim which patient took 3 doses.  Patient took Tylenol for fever last dose was at 6 PM today.  Patient denies chest pain, shortness of breath, cough, nausea, vomiting, diarrhea, rash. CT abd showed Bilateral pyelonephritis concomitant pyelitis and cystitis. No drainable   fluid collection. Started on iv abx , septic workup sent , ID cons requested  (12 Jan 2024 06:44)    PAST MEDICAL & SURGICAL HISTORY:  HTN (hypertension)      Hypothyroid      Insomnia      GERD (gastroesophageal reflux disease)      HLD (hyperlipidemia)      Neuropathy          MEDICATIONS  (STANDING):  amLODIPine   Tablet 10 milliGRAM(s) Oral daily  atorvastatin 10 milliGRAM(s) Oral at bedtime  enoxaparin Injectable 40 milliGRAM(s) SubCutaneous every 24 hours  ertapenem  IVPB      ertapenem  IVPB 1000 milliGRAM(s) IV Intermittent every 24 hours  gabapentin 300 milliGRAM(s) Oral two times a day  lactobacillus acidophilus 1 Tablet(s) Oral every 12 hours  levothyroxine 50 MICROGram(s) Oral daily  pantoprazole    Tablet 40 milliGRAM(s) Oral before breakfast  propranolol 20 milliGRAM(s) Oral at bedtime  senna 2 Tablet(s) Oral at bedtime    MEDICATIONS  (PRN):  acetaminophen     Tablet .. 650 milliGRAM(s) Oral every 6 hours PRN Temp greater or equal to 38C (100.4F), Mild Pain (1 - 3)  aluminum hydroxide/magnesium hydroxide/simethicone Suspension 30 milliLiter(s) Oral every 4 hours PRN Dyspepsia  hydrALAZINE 10 milliGRAM(s) Oral three times a day PRN Systolic blood pressure >160  magnesium hydroxide Suspension 30 milliLiter(s) Oral daily PRN Constipation  melatonin 3 milliGRAM(s) Oral at bedtime PRN Insomnia  ondansetron Injectable 4 milliGRAM(s) IV Push every 8 hours PRN Nausea and/or Vomiting  traMADol 50 milliGRAM(s) Oral three times a day PRN Moderate Pain (4 - 6)  zolpidem 5 milliGRAM(s) Oral at bedtime PRN Insomnia  zolpidem 5 milliGRAM(s) Oral at bedtime PRN Insomnia      OBJECTIVE    T(C): 36.8 (01-15-24 @ 05:32), Max: 37.3 (01-14-24 @ 17:48)  HR: 78 (01-15-24 @ 05:32) (73 - 78)  BP: 156/80 (01-15-24 @ 05:32) (146/83 - 156/80)  RR: 18 (01-15-24 @ 05:32) (16 - 18)  SpO2: 98% (01-15-24 @ 05:32) (96% - 100%)  Wt(kg): --  I&O's Summary        REVIEW OF SYSTEMS:  CONSTITUTIONAL: No fever, weight loss, or fatigue  EYES: No eye pain, visual disturbances, or discharge  ENMT:   No sinus or throat pain  NECK: No pain or stiffness  RESPIRATORY: No cough, wheezing, chills or hemoptysis; No shortness of breath  CARDIOVASCULAR: No chest pain, palpitations, dizziness, or leg swelling  GASTROINTESTINAL: No abdominal pain. No nausea, vomiting; No diarrhea or constipation. No melena or hematochezia.  GENITOURINARY: No dysuria, frequency, hematuria, or incontinence  NEUROLOGICAL: No headaches, memory loss, loss of strength, numbness, or tremors  SKIN: No itching, burning, rashes, or lesions   MUSCULOSKELETAL: No joint pain or swelling; No muscle, back, or extremity pain    PHYSICAL EXAM:  Appearance: NAD. VS past 24 hrs -as above   HEENT:   Moist oral mucosa. Conjunctiva clear b/l.   Neck : supple  Respiratory: Lungs CTAB.  Gastrointestinal:  Soft, nontender. No rebound. No rigidity. BS present	  Cardiovascular: RRR ,S1S2 present  Neurologic: Non-focal. Moving all extremities.  Extremities: No edema. No erythema. No calf tenderness.  Skin: No rashes, No ecchymoses, No cyanosis.	  wounds ,skin lesions-See skin assesment flow sheet   LABS:                        12.5   6.20  )-----------( 381      ( 15 Benny 2024 07:57 )             38.1     01-15    136  |  100  |  9   ----------------------------<  118<H>  4.3   |  26  |  0.82    Ca    9.0      15 Benny 2024 07:57      CAPILLARY BLOOD GLUCOSE          Urinalysis Basic - ( 15 Benny 2024 07:57 )    Color: x / Appearance: x / SG: x / pH: x  Gluc: 118 mg/dL / Ketone: x  / Bili: x / Urobili: x   Blood: x / Protein: x / Nitrite: x   Leuk Esterase: x / RBC: x / WBC x   Sq Epi: x / Non Sq Epi: x / Bacteria: x        Culture - Blood (collected 13 Jan 2024 16:49)  Source: .Blood Blood-Peripheral  Preliminary Report (14 Jan 2024 22:02):    No growth at 24 hours    Culture - Urine (collected 11 Jan 2024 22:44)  Source: Clean Catch Clean Catch (Midstream)  Final Report (15 Benny 2024 11:02):    10,000 - 49,000 CFU/mL Escherichia coli ESBL  Organism: Escherichia coli ESBL (15 Benny 2024 11:02)  Organism: Escherichia coli ESBL (15 Benny 2024 11:02)    Culture - Blood (collected 11 Jan 2024 20:48)  Source: .Blood Blood-Peripheral  Gram Stain (15 Benny 2024 06:06):    Growth in anaerobic bottle: Gram Negative Rods  Preliminary Report (15 Benny 2024 06:06):    Growth in anaerobic bottle: Gram Negative Rods    Culture - Blood (collected 11 Jan 2024 20:48)  Source: .Blood Blood-Peripheral  Gram Stain (13 Jan 2024 00:23):    Growth in aerobic bottle: Gram Negative Rods  Final Report (14 Jan 2024 16:48):    Growth in aerobic bottle: Escherichia coli ESBL    Direct identification is available within approximately 3-5    hours either by Blood Panel Multiplexed PCR or Direct    MALDI-TOF. Details: [email or web address]  Organism: Blood Culture PCR  Escherichia coli ESBL (14 Jan 2024 16:48)  Organism: Escherichia coli ESBL (14 Jan 2024 16:48)  Organism: Blood Culture PCR (14 Jan 2024 16:48)      RADIOLOGY & ADDITIONAL TESTS:   reviewed elctronically  ASSESSMENT/PLAN: 	    25 minutes aggregate time was spent on this visit, 50% visit time spent in care co-ordination with other attendings and counselling patient .I have discussed care plan with patient / HCP/family member ,who expressed understanding of problems treatment and their effect and side effects, alternatives in details. I have asked if they have any questions and concerns and appropriately addressed them to best of my ability.

## 2024-01-15 NOTE — PROGRESS NOTE ADULT - SUBJECTIVE AND OBJECTIVE BOX
CHIEF COMPLAINT/ REASON FOR VISIT  .. Patient was seen to address the  issue listed under PROBLEM LIST which is located toward bottom of this note     PACHECO GASCA    SY 1EST 103 D1    Allergies    No Known Allergies    Intolerances        PAST MEDICAL & SURGICAL HISTORY:  HTN (hypertension)      Hypothyroid      Insomnia      GERD (gastroesophageal reflux disease)      HLD (hyperlipidemia)      Neuropathy          FAMILY HISTORY:      Home Medications:  Ambien 10 mg oral tablet: 1 tab(s) orally once a day (at bedtime) (11 Jan 2024 23:07)  amLODIPine 10 mg oral tablet: 1 tab(s) orally once a day (11 Jan 2024 23:07)  atorvastatin 10 mg oral tablet: 1 tab(s) orally once a day (at bedtime) (11 Jan 2024 23:07)  gabapentin 300 mg oral capsule: 1 cap(s) orally 2 times a day (11 Jan 2024 23:07)  levothyroxine 50 mcg (0.05 mg) oral tablet: 1 tab(s) orally once a day (11 Jan 2024 23:07)  pantoprazole 20 mg oral delayed release tablet: 1 tab(s) orally once a day (at bedtime) (11 Jan 2024 23:07)  propranolol 20 mg oral tablet: 1 tab(s) orally once a day (at bedtime) (11 Jan 2024 23:07)      MEDICATIONS  (STANDING):  amLODIPine   Tablet 10 milliGRAM(s) Oral daily  atorvastatin 10 milliGRAM(s) Oral at bedtime  enoxaparin Injectable 40 milliGRAM(s) SubCutaneous every 24 hours  ertapenem  IVPB      ertapenem  IVPB 1000 milliGRAM(s) IV Intermittent every 24 hours  gabapentin 300 milliGRAM(s) Oral two times a day  lactobacillus acidophilus 1 Tablet(s) Oral every 12 hours  levothyroxine 50 MICROGram(s) Oral daily  pantoprazole    Tablet 40 milliGRAM(s) Oral before breakfast  propranolol 20 milliGRAM(s) Oral at bedtime  senna 2 Tablet(s) Oral at bedtime    MEDICATIONS  (PRN):  acetaminophen     Tablet .. 650 milliGRAM(s) Oral every 6 hours PRN Temp greater or equal to 38C (100.4F), Mild Pain (1 - 3)  aluminum hydroxide/magnesium hydroxide/simethicone Suspension 30 milliLiter(s) Oral every 4 hours PRN Dyspepsia  hydrALAZINE 10 milliGRAM(s) Oral three times a day PRN Systolic blood pressure >160  magnesium hydroxide Suspension 30 milliLiter(s) Oral daily PRN Constipation  melatonin 3 milliGRAM(s) Oral at bedtime PRN Insomnia  ondansetron Injectable 4 milliGRAM(s) IV Push every 8 hours PRN Nausea and/or Vomiting  traMADol 50 milliGRAM(s) Oral three times a day PRN Moderate Pain (4 - 6)  zolpidem 5 milliGRAM(s) Oral at bedtime PRN Insomnia  zolpidem 5 milliGRAM(s) Oral at bedtime PRN Insomnia      Diet, DASH/TLC:   Sodium & Cholesterol Restricted (01-11-24 @ 23:45) [Active]          Vital Signs Last 24 Hrs  T(C): 36.8 (15 Benny 2024 05:32), Max: 37.3 (14 Jan 2024 17:48)  T(F): 98.2 (15 Benny 2024 05:32), Max: 99.2 (14 Jan 2024 17:48)  HR: 78 (15 Benny 2024 05:32) (73 - 78)  BP: 156/80 (15 Benny 2024 05:32) (146/83 - 156/80)  BP(mean): --  RR: 18 (15 Benny 2024 05:32) (16 - 18)  SpO2: 98% (15 Benny 2024 05:32) (96% - 100%)    Parameters below as of 15 Benny 2024 05:32  Patient On (Oxygen Delivery Method): room air                  LABS:                        12.5   6.20  )-----------( 381      ( 15 Benny 2024 07:57 )             38.1     01-15    136  |  100  |  9   ----------------------------<  118<H>  4.3   |  26  |  0.82    Ca    9.0      15 Benny 2024 07:57        Urinalysis Basic - ( 15 Benny 2024 07:57 )    Color: x / Appearance: x / SG: x / pH: x  Gluc: 118 mg/dL / Ketone: x  / Bili: x / Urobili: x   Blood: x / Protein: x / Nitrite: x   Leuk Esterase: x / RBC: x / WBC x   Sq Epi: x / Non Sq Epi: x / Bacteria: x            WBC:  WBC Count: 6.20 K/uL (01-15 @ 07:57)  WBC Count: 5.96 K/uL (01-14 @ 06:15)  WBC Count: 7.81 K/uL (01-13 @ 06:15)  WBC Count: 10.04 K/uL (01-12 @ 06:30)  WBC Count: 11.60 K/uL (01-11 @ 20:48)      MICROBIOLOGY:  RECENT CULTURES:  01-13 .Blood Blood-Peripheral XXXX XXXX   No growth at 24 hours    01-11 Clean Catch Clean Catch (Midstream) XXXX XXXX   10,000 - 49,000 CFU/mL Escherichia coli    01-11 .Blood Blood-Peripheral Blood Culture PCR  Escherichia coli ESBL   Growth in anaerobic bottle: Gram Negative Rods   Growth in anaerobic bottle: Gram Negative Rods                    Sodium:  Sodium: 136 mmol/L (01-15 @ 07:57)  Sodium: 137 mmol/L (01-14 @ 06:15)  Sodium: 135 mmol/L (01-13 @ 06:15)  Sodium: 136 mmol/L (01-12 @ 06:30)  Sodium: 134 mmol/L (01-11 @ 20:48)      0.82 mg/dL 01-15 @ 07:57  0.78 mg/dL 01-14 @ 06:15  0.80 mg/dL 01-13 @ 06:15  0.77 mg/dL 01-12 @ 06:30  0.96 mg/dL 01-11 @ 20:48      Hemoglobin:  Hemoglobin: 12.5 g/dL (01-15 @ 07:57)  Hemoglobin: 12.6 g/dL (01-14 @ 06:15)  Hemoglobin: 11.2 g/dL (01-13 @ 06:15)  Hemoglobin: 11.3 g/dL (01-12 @ 06:30)  Hemoglobin: 12.5 g/dL (01-11 @ 20:48)      Platelets: Platelet Count - Automated: 381 K/uL (01-15 @ 07:57)  Platelet Count - Automated: 326 K/uL (01-14 @ 06:15)  Platelet Count - Automated: 283 K/uL (01-13 @ 06:15)  Platelet Count - Automated: 257 K/uL (01-12 @ 06:30)  Platelet Count - Automated: 290 K/uL (01-11 @ 20:48)          Urinalysis Basic - ( 15 Benny 2024 07:57 )    Color: x / Appearance: x / SG: x / pH: x  Gluc: 118 mg/dL / Ketone: x  / Bili: x / Urobili: x   Blood: x / Protein: x / Nitrite: x   Leuk Esterase: x / RBC: x / WBC x   Sq Epi: x / Non Sq Epi: x / Bacteria: x        RADIOLOGY & ADDITIONAL STUDIES:      MICROBIOLOGY:  RECENT CULTURES:  01-13 .Blood Blood-Peripheral XXXX XXXX   No growth at 24 hours    01-11 Clean Catch Clean Catch (Midstream) XXXX XXXX   10,000 - 49,000 CFU/mL Escherichia coli    01-11 .Blood Blood-Peripheral Blood Culture PCR  Escherichia coli ESBL   Growth in anaerobic bottle: Gram Negative Rods   Growth in anaerobic bottle: Gram Negative Rods

## 2024-01-16 ENCOUNTER — TRANSCRIPTION ENCOUNTER (OUTPATIENT)
Age: 74
End: 2024-01-16

## 2024-01-16 VITALS
DIASTOLIC BLOOD PRESSURE: 78 MMHG | RESPIRATION RATE: 18 BRPM | TEMPERATURE: 98 F | OXYGEN SATURATION: 97 % | HEART RATE: 69 BPM | SYSTOLIC BLOOD PRESSURE: 126 MMHG

## 2024-01-16 LAB
ANION GAP SERPL CALC-SCNC: 10 MMOL/L — SIGNIFICANT CHANGE UP (ref 5–17)
BUN SERPL-MCNC: 14 MG/DL — SIGNIFICANT CHANGE UP (ref 7–23)
CALCIUM SERPL-MCNC: 9.2 MG/DL — SIGNIFICANT CHANGE UP (ref 8.4–10.5)
CHLORIDE SERPL-SCNC: 100 MMOL/L — SIGNIFICANT CHANGE UP (ref 96–108)
CO2 SERPL-SCNC: 27 MMOL/L — SIGNIFICANT CHANGE UP (ref 22–31)
CREAT SERPL-MCNC: 0.79 MG/DL — SIGNIFICANT CHANGE UP (ref 0.5–1.3)
CULTURE RESULTS: ABNORMAL
EGFR: 79 ML/MIN/1.73M2 — SIGNIFICANT CHANGE UP
GLUCOSE SERPL-MCNC: 105 MG/DL — HIGH (ref 70–99)
HCT VFR BLD CALC: 37.2 % — SIGNIFICANT CHANGE UP (ref 34.5–45)
HGB BLD-MCNC: 12 G/DL — SIGNIFICANT CHANGE UP (ref 11.5–15.5)
MCHC RBC-ENTMCNC: 29.1 PG — SIGNIFICANT CHANGE UP (ref 27–34)
MCHC RBC-ENTMCNC: 32.3 GM/DL — SIGNIFICANT CHANGE UP (ref 32–36)
MCV RBC AUTO: 90.3 FL — SIGNIFICANT CHANGE UP (ref 80–100)
NRBC # BLD: 0 /100 WBCS — SIGNIFICANT CHANGE UP (ref 0–0)
PLATELET # BLD AUTO: 404 K/UL — HIGH (ref 150–400)
POTASSIUM SERPL-MCNC: 4.6 MMOL/L — SIGNIFICANT CHANGE UP (ref 3.5–5.3)
POTASSIUM SERPL-SCNC: 4.6 MMOL/L — SIGNIFICANT CHANGE UP (ref 3.5–5.3)
RBC # BLD: 4.12 M/UL — SIGNIFICANT CHANGE UP (ref 3.8–5.2)
RBC # FLD: 14 % — SIGNIFICANT CHANGE UP (ref 10.3–14.5)
SODIUM SERPL-SCNC: 137 MMOL/L — SIGNIFICANT CHANGE UP (ref 135–145)
WBC # BLD: 6.29 K/UL — SIGNIFICANT CHANGE UP (ref 3.8–10.5)
WBC # FLD AUTO: 6.29 K/UL — SIGNIFICANT CHANGE UP (ref 3.8–10.5)

## 2024-01-16 PROCEDURE — 83605 ASSAY OF LACTIC ACID: CPT

## 2024-01-16 PROCEDURE — 76937 US GUIDE VASCULAR ACCESS: CPT

## 2024-01-16 PROCEDURE — 71045 X-RAY EXAM CHEST 1 VIEW: CPT

## 2024-01-16 PROCEDURE — 85025 COMPLETE CBC W/AUTO DIFF WBC: CPT

## 2024-01-16 PROCEDURE — C1751: CPT

## 2024-01-16 PROCEDURE — 71250 CT THORAX DX C-: CPT

## 2024-01-16 PROCEDURE — 96365 THER/PROPH/DIAG IV INF INIT: CPT

## 2024-01-16 PROCEDURE — 87186 SC STD MICRODIL/AGAR DIL: CPT

## 2024-01-16 PROCEDURE — 87077 CULTURE AEROBIC IDENTIFY: CPT

## 2024-01-16 PROCEDURE — 85730 THROMBOPLASTIN TIME PARTIAL: CPT

## 2024-01-16 PROCEDURE — 96366 THER/PROPH/DIAG IV INF ADDON: CPT

## 2024-01-16 PROCEDURE — 84145 PROCALCITONIN (PCT): CPT

## 2024-01-16 PROCEDURE — 36415 COLL VENOUS BLD VENIPUNCTURE: CPT

## 2024-01-16 PROCEDURE — 87040 BLOOD CULTURE FOR BACTERIA: CPT

## 2024-01-16 PROCEDURE — 86803 HEPATITIS C AB TEST: CPT

## 2024-01-16 PROCEDURE — 80048 BASIC METABOLIC PNL TOTAL CA: CPT

## 2024-01-16 PROCEDURE — 87150 DNA/RNA AMPLIFIED PROBE: CPT

## 2024-01-16 PROCEDURE — 85027 COMPLETE CBC AUTOMATED: CPT

## 2024-01-16 PROCEDURE — 85610 PROTHROMBIN TIME: CPT

## 2024-01-16 PROCEDURE — 99285 EMERGENCY DEPT VISIT HI MDM: CPT | Mod: 25

## 2024-01-16 PROCEDURE — 0225U NFCT DS DNA&RNA 21 SARSCOV2: CPT

## 2024-01-16 PROCEDURE — 74177 CT ABD & PELVIS W/CONTRAST: CPT | Mod: MA

## 2024-01-16 PROCEDURE — 87086 URINE CULTURE/COLONY COUNT: CPT

## 2024-01-16 PROCEDURE — 93005 ELECTROCARDIOGRAM TRACING: CPT

## 2024-01-16 PROCEDURE — 80053 COMPREHEN METABOLIC PANEL: CPT

## 2024-01-16 PROCEDURE — 81001 URINALYSIS AUTO W/SCOPE: CPT

## 2024-01-16 RX ORDER — ERTAPENEM SODIUM 1 G/1
1 INJECTION, POWDER, LYOPHILIZED, FOR SOLUTION INTRAMUSCULAR; INTRAVENOUS
Qty: 6 | Refills: 0
Start: 2024-01-16 | End: 2024-01-21

## 2024-01-16 RX ORDER — ACETAMINOPHEN 500 MG
2 TABLET ORAL
Qty: 0 | Refills: 0 | DISCHARGE
Start: 2024-01-16

## 2024-01-16 RX ORDER — SENNA PLUS 8.6 MG/1
2 TABLET ORAL
Qty: 0 | Refills: 0 | DISCHARGE
Start: 2024-01-16

## 2024-01-16 RX ORDER — LACTOBACILLUS ACIDOPHILUS 100MM CELL
2 CAPSULE ORAL
Qty: 28 | Refills: 0
Start: 2024-01-16 | End: 2024-01-29

## 2024-01-16 RX ADMIN — Medication 1 TABLET(S): at 08:05

## 2024-01-16 RX ADMIN — PANTOPRAZOLE SODIUM 40 MILLIGRAM(S): 20 TABLET, DELAYED RELEASE ORAL at 05:59

## 2024-01-16 RX ADMIN — Medication 50 MICROGRAM(S): at 05:59

## 2024-01-16 RX ADMIN — Medication 650 MILLIGRAM(S): at 12:27

## 2024-01-16 RX ADMIN — ENOXAPARIN SODIUM 40 MILLIGRAM(S): 100 INJECTION SUBCUTANEOUS at 05:59

## 2024-01-16 RX ADMIN — ERTAPENEM SODIUM 120 MILLIGRAM(S): 1 INJECTION, POWDER, LYOPHILIZED, FOR SOLUTION INTRAMUSCULAR; INTRAVENOUS at 12:01

## 2024-01-16 RX ADMIN — Medication 650 MILLIGRAM(S): at 00:15

## 2024-01-16 RX ADMIN — AMLODIPINE BESYLATE 10 MILLIGRAM(S): 2.5 TABLET ORAL at 06:00

## 2024-01-16 RX ADMIN — GABAPENTIN 300 MILLIGRAM(S): 400 CAPSULE ORAL at 08:05

## 2024-01-16 NOTE — PROGRESS NOTE ADULT - PROBLEM SELECTOR PROBLEM 8
Abnormal screening mammogram

## 2024-01-16 NOTE — DISCHARGE NOTE NURSING/CASE MANAGEMENT/SOCIAL WORK - CASE MANAGER'S NAME
Lindsay Fritz RN [phone]/main number [phone]  Lindsay Fritz RN [phone]/main number [phone]

## 2024-01-16 NOTE — DISCHARGE NOTE PROVIDER - CARE PROVIDERS DIRECT ADDRESSES
,DirectAddress_Unknown,DirectAddress_Unknown,[email or web address] ,DirectAddress_Unknown,DirectAddress_Unknown,[email or web address]

## 2024-01-16 NOTE — PROGRESS NOTE ADULT - SUBJECTIVE AND OBJECTIVE BOX
CHIEF COMPLAINT/ REASON FOR VISIT  .. Patient was seen to address the  issue listed under PROBLEM LIST which is located toward bottom of this note     PACHECO GASCA    SY 1EST [birth year] D1    Allergies    No Known Allergies    Intolerances        PAST MEDICAL & SURGICAL HISTORY:  HTN (hypertension)      Hypothyroid      Insomnia      GERD (gastroesophageal reflux disease)      HLD (hyperlipidemia)      Neuropathy          FAMILY HISTORY:      Home Medications:  Ambien 10 mg oral tablet: 1 tab(s) orally once a day (at bedtime) (11 Jan 2024 23:07)  amLODIPine 10 mg oral tablet: 1 tab(s) orally once a day (11 Jan 2024 23:07)  atorvastatin 10 mg oral tablet: 1 tab(s) orally once a day (at bedtime) (11 Jan 2024 23:07)  gabapentin 300 mg oral capsule: 1 cap(s) orally 2 times a day (11 Jan 2024 23:07)  levothyroxine 50 mcg (0.05 mg) oral tablet: 1 tab(s) orally once a day (11 Jan 2024 23:07)  pantoprazole 20 mg oral delayed release tablet: 1 tab(s) orally once a day (at bedtime) (11 Jan 2024 23:07)  propranolol 20 mg oral tablet: 1 tab(s) orally once a day (at bedtime) (11 Jan 2024 23:07)      MEDICATIONS  (STANDING):  amLODIPine   Tablet 10 milliGRAM(s) Oral daily  atorvastatin 10 milliGRAM(s) Oral at bedtime  enoxaparin Injectable 40 milliGRAM(s) SubCutaneous every 24 hours  ertapenem  IVPB      ertapenem  IVPB 1000 milliGRAM(s) IV Intermittent every 24 hours  gabapentin 300 milliGRAM(s) Oral two times a day  lactobacillus acidophilus 1 Tablet(s) Oral every 12 hours  levothyroxine 50 MICROGram(s) Oral daily  pantoprazole    Tablet 40 milliGRAM(s) Oral before breakfast  propranolol 20 milliGRAM(s) Oral at bedtime  senna 2 Tablet(s) Oral at bedtime    MEDICATIONS  (PRN):  acetaminophen     Tablet .. 650 milliGRAM(s) Oral every 6 hours PRN Temp greater or equal to 38C (100.4F), Mild Pain (1 - 3)  aluminum hydroxide/magnesium hydroxide/simethicone Suspension 30 milliLiter(s) Oral every 4 hours PRN Dyspepsia  hydrALAZINE 10 milliGRAM(s) Oral three times a day PRN Systolic blood pressure >160  magnesium hydroxide Suspension 30 milliLiter(s) Oral daily PRN Constipation  melatonin 3 milliGRAM(s) Oral at bedtime PRN Insomnia  ondansetron Injectable 4 milliGRAM(s) IV Push every 8 hours PRN Nausea and/or Vomiting  traMADol 50 milliGRAM(s) Oral three times a day PRN Moderate Pain (4 - 6)  zolpidem 5 milliGRAM(s) Oral at bedtime PRN Insomnia  zolpidem 5 milliGRAM(s) Oral at bedtime PRN Insomnia      Diet, DASH/TLC:   Sodium & Cholesterol Restricted (01-11-24 @ 23:45) [Active]          Vital Signs Last 24 Hrs  T(C): 36.6 (16 Jan 2024 05:32), Max: 36.9 (15 Benny 2024 14:23)  T(F): 97.9 (16 Jan 2024 05:32), Max: 98.4 (15 Benny 2024 14:23)  HR: 63 (16 Jan 2024 05:32) (63 - 77)  BP: 149/82 (16 Jan 2024 05:32) (114/73 - 149/82)  BP(mean): --  RR: 16 (16 Jan 2024 05:32) (16 - 18)  SpO2: 99% (16 Jan 2024 05:32) (97% - 99%)    Parameters below as of 16 Jan 2024 05:32  Patient On (Oxygen Delivery Method): room air                  LABS:                        12.5   6.20  )-----------( 381      ( 15 Benny 2024 07:57 )             38.1     01-15    136  |  100  |  9   ----------------------------<  118<H>  4.3   |  26  |  0.82    Ca    9.0      15 Benny 2024 07:57        Urinalysis Basic - ( 15 Benny 2024 07:57 )    Color: x / Appearance: x / SG: x / pH: x  Gluc: 118 mg/dL / Ketone: x  / Bili: x / Urobili: x   Blood: x / Protein: x / Nitrite: x   Leuk Esterase: x / RBC: x / WBC x   Sq Epi: x / Non Sq Epi: x / Bacteria: x            WBC:  WBC Count: 6.20 K/uL (01-15 @ 07:57)  WBC Count: 5.96 K/uL (01-14 @ 06:15)  WBC Count: 7.81 K/uL (01-13 @ 06:15)      MICROBIOLOGY:  RECENT CULTURES:  01-14 .Blood Blood XXXX XXXX   No growth at 24 hours    01-13 .Blood Blood-Peripheral XXXX XXXX   No growth at 48 Hours    01-11 Clean Catch Clean Catch (Midstream) Escherichia coli ESBL XXXX   10,000 - 49,000 CFU/mL Escherichia coli ESBL    01-11 .Blood Blood-Peripheral Blood Culture PCR  Escherichia coli ESBL   Growth in anaerobic bottle: Gram Negative Rods   Growth in aerobic and anaerobic bottles: Escherichia coli ESBL  See previous culture [id number]                    Sodium:  Sodium: 136 mmol/L (01-15 @ 07:57)  Sodium: 137 mmol/L (01-14 @ 06:15)  Sodium: 135 mmol/L (01-13 @ 06:15)      0.82 mg/dL 01-15 @ 07:57  0.78 mg/dL 01-14 @ 06:15  0.80 mg/dL 01-13 @ 06:15      Hemoglobin:  Hemoglobin: 12.5 g/dL (01-15 @ 07:57)  Hemoglobin: 12.6 g/dL (01-14 @ 06:15)  Hemoglobin: 11.2 g/dL (01-13 @ 06:15)      Platelets: Platelet Count - Automated: 381 K/uL (01-15 @ 07:57)  Platelet Count - Automated: 326 K/uL (01-14 @ 06:15)  Platelet Count - Automated: 283 K/uL (01-13 @ 06:15)          Urinalysis Basic - ( 15 Benny 2024 07:57 )    Color: x / Appearance: x / SG: x / pH: x  Gluc: 118 mg/dL / Ketone: x  / Bili: x / Urobili: x   Blood: x / Protein: x / Nitrite: x   Leuk Esterase: x / RBC: x / WBC x   Sq Epi: x / Non Sq Epi: x / Bacteria: x        RADIOLOGY & ADDITIONAL STUDIES:      MICROBIOLOGY:  RECENT CULTURES:  01-14 .Blood Blood XXXX XXXX   No growth at 24 hours    01-13 .Blood Blood-Peripheral XXXX XXXX   No growth at 48 Hours    01-11 Clean Catch Clean Catch (Midstream) Escherichia coli ESBL XXXX   10,000 - 49,000 CFU/mL Escherichia coli ESBL    01-11 .Blood Blood-Peripheral Blood Culture PCR  Escherichia coli ESBL   Growth in anaerobic bottle: Gram Negative Rods   Growth in aerobic and anaerobic bottles: Escherichia coli ESBL  See previous culture [id number]                  CHIEF COMPLAINT/ REASON FOR VISIT  .. Patient was seen to address the  issue listed under PROBLEM LIST which is located toward bottom of this note     PACHECO GASCA    SY 1EST [birth year] D1    Allergies    No Known Allergies    Intolerances        PAST MEDICAL & SURGICAL HISTORY:  HTN (hypertension)      Hypothyroid      Insomnia      GERD (gastroesophageal reflux disease)      HLD (hyperlipidemia)      Neuropathy          FAMILY HISTORY:      Home Medications:  Ambien 10 mg oral tablet: 1 tab(s) orally once a day (at bedtime) (11 Jan 2024 23:07)  amLODIPine 10 mg oral tablet: 1 tab(s) orally once a day (11 Jan 2024 23:07)  atorvastatin 10 mg oral tablet: 1 tab(s) orally once a day (at bedtime) (11 Jan 2024 23:07)  gabapentin 300 mg oral capsule: 1 cap(s) orally 2 times a day (11 Jan 2024 23:07)  levothyroxine 50 mcg (0.05 mg) oral tablet: 1 tab(s) orally once a day (11 Jan 2024 23:07)  pantoprazole 20 mg oral delayed release tablet: 1 tab(s) orally once a day (at bedtime) (11 Jan 2024 23:07)  propranolol 20 mg oral tablet: 1 tab(s) orally once a day (at bedtime) (11 Jan 2024 23:07)      MEDICATIONS  (STANDING):  amLODIPine   Tablet 10 milliGRAM(s) Oral daily  atorvastatin 10 milliGRAM(s) Oral at bedtime  enoxaparin Injectable 40 milliGRAM(s) SubCutaneous every 24 hours  ertapenem  IVPB      ertapenem  IVPB 1000 milliGRAM(s) IV Intermittent every 24 hours  gabapentin 300 milliGRAM(s) Oral two times a day  lactobacillus acidophilus 1 Tablet(s) Oral every 12 hours  levothyroxine 50 MICROGram(s) Oral daily  pantoprazole    Tablet 40 milliGRAM(s) Oral before breakfast  propranolol 20 milliGRAM(s) Oral at bedtime  senna 2 Tablet(s) Oral at bedtime    MEDICATIONS  (PRN):  acetaminophen     Tablet .. 650 milliGRAM(s) Oral every 6 hours PRN Temp greater or equal to 38C (100.4F), Mild Pain (1 - 3)  aluminum hydroxide/magnesium hydroxide/simethicone Suspension 30 milliLiter(s) Oral every 4 hours PRN Dyspepsia  hydrALAZINE 10 milliGRAM(s) Oral three times a day PRN Systolic blood pressure >160  magnesium hydroxide Suspension 30 milliLiter(s) Oral daily PRN Constipation  melatonin 3 milliGRAM(s) Oral at bedtime PRN Insomnia  ondansetron Injectable 4 milliGRAM(s) IV Push every 8 hours PRN Nausea and/or Vomiting  traMADol 50 milliGRAM(s) Oral three times a day PRN Moderate Pain (4 - 6)  zolpidem 5 milliGRAM(s) Oral at bedtime PRN Insomnia  zolpidem 5 milliGRAM(s) Oral at bedtime PRN Insomnia      Diet, DASH/TLC:   Sodium & Cholesterol Restricted (01-11-24 @ 23:45) [Active]          Vital Signs Last 24 Hrs  T(C): 36.6 (16 Jan 2024 05:32), Max: 36.9 (15 Benny 2024 14:23)  T(F): 97.9 (16 Jan 2024 05:32), Max: 98.4 (15 Benny 2024 14:23)  HR: 63 (16 Jan 2024 05:32) (63 - 77)  BP: 149/82 (16 Jan 2024 05:32) (114/73 - 149/82)  BP(mean): --  RR: 16 (16 Jan 2024 05:32) (16 - 18)  SpO2: 99% (16 Jan 2024 05:32) (97% - 99%)    Parameters below as of 16 Jan 2024 05:32  Patient On (Oxygen Delivery Method): room air                  LABS:                        12.5   6.20  )-----------( 381      ( 15 Benny 2024 07:57 )             38.1     01-15    136  |  100  |  9   ----------------------------<  118<H>  4.3   |  26  |  0.82    Ca    9.0      15 Benny 2024 07:57        Urinalysis Basic - ( 15 Benny 2024 07:57 )    Color: x / Appearance: x / SG: x / pH: x  Gluc: 118 mg/dL / Ketone: x  / Bili: x / Urobili: x   Blood: x / Protein: x / Nitrite: x   Leuk Esterase: x / RBC: x / WBC x   Sq Epi: x / Non Sq Epi: x / Bacteria: x            WBC:  WBC Count: 6.20 K/uL (01-15 @ 07:57)  WBC Count: 5.96 K/uL (01-14 @ 06:15)  WBC Count: 7.81 K/uL (01-13 @ 06:15)      MICROBIOLOGY:  RECENT CULTURES:  01-14 .Blood Blood XXXX XXXX   No growth at 24 hours    01-13 .Blood Blood-Peripheral XXXX XXXX   No growth at 48 Hours    01-11 Clean Catch Clean Catch (Midstream) Escherichia coli ESBL XXXX   10,000 - 49,000 CFU/mL Escherichia coli ESBL    01-11 .Blood Blood-Peripheral Blood Culture PCR  Escherichia coli ESBL   Growth in anaerobic bottle: Gram Negative Rods   Growth in aerobic and anaerobic bottles: Escherichia coli ESBL  See previous culture [id number]                    Sodium:  Sodium: 136 mmol/L (01-15 @ 07:57)  Sodium: 137 mmol/L (01-14 @ 06:15)  Sodium: 135 mmol/L (01-13 @ 06:15)      0.82 mg/dL 01-15 @ 07:57  0.78 mg/dL 01-14 @ 06:15  0.80 mg/dL 01-13 @ 06:15      Hemoglobin:  Hemoglobin: 12.5 g/dL (01-15 @ 07:57)  Hemoglobin: 12.6 g/dL (01-14 @ 06:15)  Hemoglobin: 11.2 g/dL (01-13 @ 06:15)      Platelets: Platelet Count - Automated: 381 K/uL (01-15 @ 07:57)  Platelet Count - Automated: 326 K/uL (01-14 @ 06:15)  Platelet Count - Automated: 283 K/uL (01-13 @ 06:15)          Urinalysis Basic - ( 15 Benny 2024 07:57 )    Color: x / Appearance: x / SG: x / pH: x  Gluc: 118 mg/dL / Ketone: x  / Bili: x / Urobili: x   Blood: x / Protein: x / Nitrite: x   Leuk Esterase: x / RBC: x / WBC x   Sq Epi: x / Non Sq Epi: x / Bacteria: x        RADIOLOGY & ADDITIONAL STUDIES:      MICROBIOLOGY:  RECENT CULTURES:  01-14 .Blood Blood XXXX XXXX   No growth at 24 hours    01-13 .Blood Blood-Peripheral XXXX XXXX   No growth at 48 Hours    01-11 Clean Catch Clean Catch (Midstream) Escherichia coli ESBL XXXX   10,000 - 49,000 CFU/mL Escherichia coli ESBL    01-11 .Blood Blood-Peripheral Blood Culture PCR  Escherichia coli ESBL   Growth in anaerobic bottle: Gram Negative Rods   Growth in aerobic and anaerobic bottles: Escherichia coli ESBL  See previous culture [id number]

## 2024-01-16 NOTE — DISCHARGE NOTE PROVIDER - HOSPITAL COURSE
Patient is a 73-year-old female with past medical history of hypertension and hypothyroidism presents with abdominal pain for days.  Patient reports diffuse abdominal pain rating to the left flank with dysuria.  Patient reports associated fever 101 Tmax for the past week.  Patient went to urgent care 2 days ago and was diagnosed with a UTI and given Bactrim which patient took 3 doses.  Patient took Tylenol for fever last dose was at 6 PM today.  Patient denies chest pain, shortness of breath, cough, nausea, vomiting, diarrhea, rash. CT abd showed Bilateral pyelonephritis concomitant pyelitis and cystitis. No drainable   fluid collection. Started on iv abx , septic workup sent , ID cons requested         Problem/Plan - 1:  ·  Problem: Gram-negative sepsis, unspecified.   ·  Plan: 2/2 to Pyelonephritis ,poa -Plan :   Change antibiotic to Ertapenem 01/13/24   Fu cultures to completion   Repeat blood cultures  Trend temps and cbc  ID f/up.    Problem/Plan - 2:  ·  Problem: Acute pyelonephritis.   ·  Plan: septic workup ,iv abx , ID  cons , follow cx to completion , iv fluids.    Problem/Plan - 3:  ·  Problem: Hypothyroid.   ·  Plan: continue home medications.    Problem/Plan - 4:  ·  Problem: HTN (hypertension).   ·  Plan: continue home medications.    Problem/Plan - 5:  ·  Problem: GERD (gastroesophageal reflux disease).   ·  Plan: ppi.    Problem/Plan - 6:  ·  Problem: Neuropathy.   ·  Plan: pain management , pt/ot.    Problem/Plan - 7:  ·  Problem: Insomnia.   ·  Plan: continue home medications.    Problem/Plan - 8:  ·  Problem: Abnormal screening mammogram.   ·  Plan: report from 07/23 reviewed with patient and son - Repeat mammogram in january 2024 , d/w the son and family is aware and already is scheduling test at radiology facility.    Problem/Plan - 9:  ·  Problem: Prophylactic measure.   ·  Plan: Gastrointestinal stress ulcer prophylaxis and DVT prophylaxis administered.

## 2024-01-16 NOTE — PROGRESS NOTE ADULT - PROBLEM SELECTOR PLAN 1
2/2 to Pyelonephritis ,poa -Plan :   Change antibiotic to Ertapenem 01/13/24   Fu cultures to completion   Repeat blood cultures  Trend temps and cbc  ID f/up

## 2024-01-16 NOTE — DISCHARGE NOTE PROVIDER - PROVIDER TOKENS
PROVIDER:[TOKEN:[[id number]],FOLLOWUP:[1 week]],PROVIDER:[TOKEN:[[id number]],FOLLOWUP:[1 week]],PROVIDER:[TOKEN:[[id number]],FOLLOWUP:[2 weeks]]

## 2024-01-16 NOTE — DISCHARGE NOTE NURSING/CASE MANAGEMENT/SOCIAL WORK - NSDCFUADDAPPT_GEN_ALL_CORE_FT
Your doctor is recommending for you to have a follow-up appt with ID Kim Hernandez DR in 1 week - @ [address] [place] [address], [place] [address] [phone]/ fax [phone] Your doctor is recommending for you to have a follow-up appt with ID Kim Hernandez DR in 1 week - @ [address] [place] [address], [place] [address] [phone]/ fax [phone]

## 2024-01-16 NOTE — CHART NOTE - NSCHARTNOTEFT_GEN_A_CORE
Assessment: Per chart, "Patient is a 73-year-old female with past medical history of hypertension and hypothyroidism presents with abdominal pain for days.  Patient reports diffuse abdominal pain rating to the left flank with dysuria.  Patient reports associated fever 101 Tmax for the past week.  Patient went to urgent care 2 days ago and was diagnosed with a UTI and given Bactrim which patient took 3 doses.  Patient took Tylenol for fever last dose was at 6 PM today.  Patient denies chest pain, shortness of breath, cough, nausea, vomiting, diarrhea, rash. CT abd showed Bilateral pyelonephritis concomitant pyelitis and cystitis. No drainable fluid collection. Started on iv abx , septic workup sent."    1/16  Pt seen for nutrition follow up.  Pt's son and spouse at bedside.  Pt sitting up in bed,           Factors impacting intake: [ ] none [ ] nausea  [ ] vomiting [ ] diarrhea [ ] constipation  [ ]chewing problems [ ] swallowing issues  [ ] other:     Diet Prescription: Diet, DASH/TLC:   Sodium & Cholesterol Restricted (01-11-24 @ 23:45)    Intake: variable, fair overall; prefers traditional  food brought in from home    Current Weight: Weight (kg): 83 (01-11 @ 20:11)  % Weight Change  1/13 181.6#, no edema    Pertinent Medications: MEDICATIONS  (STANDING):  amLODIPine   Tablet 10 milliGRAM(s) Oral daily  atorvastatin 10 milliGRAM(s) Oral at bedtime  enoxaparin Injectable 40 milliGRAM(s) SubCutaneous every 24 hours  ertapenem  IVPB      ertapenem  IVPB 1000 milliGRAM(s) IV Intermittent every 24 hours  gabapentin 300 milliGRAM(s) Oral two times a day  lactobacillus acidophilus 1 Tablet(s) Oral every 12 hours  levothyroxine 50 MICROGram(s) Oral daily  pantoprazole    Tablet 40 milliGRAM(s) Oral before breakfast  propranolol 20 milliGRAM(s) Oral at bedtime  senna 2 Tablet(s) Oral at bedtime    MEDICATIONS  (PRN):  acetaminophen     Tablet .. 650 milliGRAM(s) Oral every 6 hours PRN Temp greater or equal to 38C (100.4F), Mild Pain (1 - 3)  aluminum hydroxide/magnesium hydroxide/simethicone Suspension 30 milliLiter(s) Oral every 4 hours PRN Dyspepsia  hydrALAZINE 10 milliGRAM(s) Oral three times a day PRN Systolic blood pressure >160  magnesium hydroxide Suspension 30 milliLiter(s) Oral daily PRN Constipation  melatonin 3 milliGRAM(s) Oral at bedtime PRN Insomnia  ondansetron Injectable 4 milliGRAM(s) IV Push every 8 hours PRN Nausea and/or Vomiting  traMADol 50 milliGRAM(s) Oral three times a day PRN Moderate Pain (4 - 6)  zolpidem 5 milliGRAM(s) Oral at bedtime PRN Insomnia  zolpidem 5 milliGRAM(s) Oral at bedtime PRN Insomnia    Pertinent Labs: 01-16 Na137 mmol/L Glu 105 mg/dL<H> K+ 4.6 mmol/L Cr  0.79 mg/dL BUN 14 mg/dL 01-13 Alb 2.8 g/dL<L>     CAPILLARY BLOOD GLUCOSE        Skin: no pressure ulcers, no edema    Estimated Needs:   [ x] no change since previous assessment  [ ] recalculated:     Previous Nutrition Diagnosis:   [ ] Inadequate Energy Intake [x ]Inadequate Oral Intake [ ] Excessive Energy Intake   [ ] Underweight [ ] Increased Nutrient Needs [ ] Overweight/Obesity   [ ] Altered GI Function [ ] Unintended Weight Loss [ ] Food & Nutrition Related Knowledge Deficit [ ] Malnutrition     Nutrition Diagnosis is [x ] ongoing  [ ] resolved [ ] not applicable     New Nutrition Diagnosis: [ ] not applicable       Interventions: DASH/TLC diet, encouraged po intake, honor food preferences  Recommend  [ ] Change Diet To:  [ ] Nutrition Supplement  [ ] Nutrition Support  [ ] Other:     Monitoring and Evaluation:   [ x] PO intake [ x ] Tolerance to diet prescription [ x ] weights [ x ] labs[ x ] follow up per protocol  [ ] other: Assessment: Per chart, "Patient is a 73-year-old female with past medical history of hypertension and hypothyroidism presents with abdominal pain for days.  Patient reports diffuse abdominal pain rating to the left flank with dysuria.  Patient reports associated fever 101 Tmax for the past week.  Patient went to urgent care 2 days ago and was diagnosed with a UTI and given Bactrim which patient took 3 doses.  Patient took Tylenol for fever last dose was at 6 PM today.  Patient denies chest pain, shortness of breath, cough, nausea, vomiting, diarrhea, rash. CT abd showed Bilateral pyelonephritis concomitant pyelitis and cystitis. No drainable fluid collection. Started on iv abx , septic workup sent."    1/16  Pt seen for nutrition follow up.  Pt's son and spouse at bedside.  Pt admitted with abd pain, sepsis/pyelonephritis. Pt on DASH/TLC diet throughout admission with variable po intake, partially secondary to meal selections as patient prefers traditional  foods.  Meal selections discussed daily to optimize po intake and tolerance and family has brought in food from home on occasion.  PO intake encouraged as tolerated. S/P midline.  Pt/family appear hopeful for discharge later today.  RD to follow up and will continue to monitor pt's nutrition status.    Factors impacting intake: [ ] none [ ] nausea  [ ] vomiting [ ] diarrhea [ ] constipation  [ ]chewing problems [ ] swallowing issues  [ ] other:     Diet Prescription: Diet, DASH/TLC:   Sodium & Cholesterol Restricted (01-11-24 @ 23:45)    Intake: variable, fair overall; prefers traditional  food brought in from home    Current Weight: Weight (kg): 83 (01-11 @ 20:11)  % Weight Change  1/13 181.6#, no edema    Pertinent Medications: MEDICATIONS  (STANDING):  amLODIPine   Tablet 10 milliGRAM(s) Oral daily  atorvastatin 10 milliGRAM(s) Oral at bedtime  enoxaparin Injectable 40 milliGRAM(s) SubCutaneous every 24 hours  ertapenem  IVPB      ertapenem  IVPB 1000 milliGRAM(s) IV Intermittent every 24 hours  gabapentin 300 milliGRAM(s) Oral two times a day  lactobacillus acidophilus 1 Tablet(s) Oral every 12 hours  levothyroxine 50 MICROGram(s) Oral daily  pantoprazole    Tablet 40 milliGRAM(s) Oral before breakfast  propranolol 20 milliGRAM(s) Oral at bedtime  senna 2 Tablet(s) Oral at bedtime    MEDICATIONS  (PRN):  acetaminophen     Tablet .. 650 milliGRAM(s) Oral every 6 hours PRN Temp greater or equal to 38C (100.4F), Mild Pain (1 - 3)  aluminum hydroxide/magnesium hydroxide/simethicone Suspension 30 milliLiter(s) Oral every 4 hours PRN Dyspepsia  hydrALAZINE 10 milliGRAM(s) Oral three times a day PRN Systolic blood pressure >160  magnesium hydroxide Suspension 30 milliLiter(s) Oral daily PRN Constipation  melatonin 3 milliGRAM(s) Oral at bedtime PRN Insomnia  ondansetron Injectable 4 milliGRAM(s) IV Push every 8 hours PRN Nausea and/or Vomiting  traMADol 50 milliGRAM(s) Oral three times a day PRN Moderate Pain (4 - 6)  zolpidem 5 milliGRAM(s) Oral at bedtime PRN Insomnia  zolpidem 5 milliGRAM(s) Oral at bedtime PRN Insomnia    Pertinent Labs: 01-16 Na137 mmol/L Glu 105 mg/dL<H> K+ 4.6 mmol/L Cr  0.79 mg/dL BUN 14 mg/dL 01-13 Alb 2.8 g/dL<L>     CAPILLARY BLOOD GLUCOSE        Skin: no pressure ulcers, no edema    Estimated Needs:   [ x] no change since previous assessment  [ ] recalculated:     Previous Nutrition Diagnosis:   [ ] Inadequate Energy Intake [x ]Inadequate Oral Intake [ ] Excessive Energy Intake   [ ] Underweight [ ] Increased Nutrient Needs [ ] Overweight/Obesity   [ ] Altered GI Function [ ] Unintended Weight Loss [ ] Food & Nutrition Related Knowledge Deficit [ ] Malnutrition     Nutrition Diagnosis is [x ] ongoing  [ ] resolved [ ] not applicable     New Nutrition Diagnosis: [ ] not applicable       Interventions: DASH/TLC diet, encouraged po intake, honor food preferences  Recommend  [ ] Change Diet To:  [ ] Nutrition Supplement  [ ] Nutrition Support  [ ] Other:     Monitoring and Evaluation:   [ x] PO intake [ x ] Tolerance to diet prescription [ x ] weights [ x ] labs[ x ] follow up per protocol  [ ] other:

## 2024-01-16 NOTE — PROCEDURE NOTE - ADDITIONAL PROCEDURE DETAILS
15cm bard power midline inserted into patent left basilic vein under sterile conditions and ultrasound guidance.  Midline catheter can be accessed.

## 2024-01-16 NOTE — PROGRESS NOTE ADULT - PROBLEM SELECTOR PROBLEM 1
Gram-negative sepsis, unspecified

## 2024-01-16 NOTE — DISCHARGE NOTE NURSING/CASE MANAGEMENT/SOCIAL WORK - NSDCVIVACCINE_GEN_ALL_CORE_FT
Patient is here for the following complaints:  1.acid reflux    HISTORY:  Here for medicare wellness   She is on omeprazole which she takes before breakfast  Sometimes it flares at breakfast.   No dysphagia.   Otherwise she feels ok.   Some constipation as usual  She drinks plenty and takes fiber  EXAMINATION:  BLOOD PRESSURE:     As above  nad   neck;: no bruit  LUNGS:  Clear to ausculation  CORONARY:    rr nos 3  ABDOMEN: soft no mass    IMPRESSION:  1. gerd        PLAN:  1. Reviewed medicare wellness  2. She can try bid omeprazole for a week if gi upst  3. cpe six months  4. She can try miralax if necessary   Notified of pending retirment     No Vaccines Administered.

## 2024-01-16 NOTE — PROGRESS NOTE ADULT - PROBLEM SELECTOR PLAN 2
septic workup ,iv abx , ID  cons , follow cx to completion , iv fluids

## 2024-01-16 NOTE — PATIENT CHOICE NOTE. - NSPTCHOICESTATE_GEN_ALL_CORE
I have met with the patient and/or caregiver to discuss discharge goals and treatment plan. Patient and/or caregiver also provided with instructions on accessing the CMS Compare websites for additional information related to Post Acute Provider quality and resource use measures to assist them in evaluation of the providers and in selecting their post-acute provider of choice. Patient and caregiver were informed of the facilities that are owned and/or operated by [facility]. I have discussed with the patient the availability of in-network facilities and providers. Patient and caregiver provided with a list of post-acute providers whose services are appropriate to the discharge plans and patient needs.     For patient requiring durable medical equipment, patient and/or caregiver were informed that they have the right to request who provides the required equipment. I have met with the patient and/or caregiver to discuss discharge goals and treatment plan. Patient and/or caregiver also provided with instructions on accessing the CMS Compare websites for additional information related to Post Acute Provider quality and resource use measures to assist them in evaluation of the providers and in selecting their post-acute provider of choice. Patient and caregiver were informed of the facilities that are owned and/or operated by [facility]. I have discussed with the patient the availability of in-network facilities and providers. Patient and caregiver provided with a list of post-acute providers whose services are appropriate to the discharge plans and patient needs.     For patient requiring durable medical equipment, patient and/or caregiver were informed that they have the right to request who provides the required equipment.

## 2024-01-16 NOTE — DISCHARGE NOTE PROVIDER - CARE PROVIDER_API CALL
Aly Marques  Internal Medicine  [address] [place] [address], [address]  [place] [address]  Phone: [phone]  Fax: [phone]  Follow Up Time: 1 week    Kenji Acuna  Infectious Disease  [address] [place] [address]  Phone: [phone]  Fax: [phone]  Follow Up Time: 1 week    Alireza Morin  Cardiology  [address] [place] [address], [address]  [place] [address]  Phone: [phone]  Fax: [phone]  Follow Up Time: 2 weeks   Aly Marques  Internal Medicine  [address] [place] [address], [address]  [place] [address]  Phone: [phone]  Fax: [phone]  Follow Up Time: 1 week    Kenji Acuna  Infectious Disease  [address] [place] [id number]  Phone: [phone]  Fax: [phone]  Follow Up Time: 1 week    Alireza Morin  Cardiology  [address] [place] [address], [address]  [place] [address]  Phone: [phone]  Fax: [phone]  Follow Up Time: 2 weeks

## 2024-01-16 NOTE — PROGRESS NOTE ADULT - SUBJECTIVE AND OBJECTIVE BOX
Covering [facility] DIVISION of INFECTIOUS DISEASE  LAY Kearns, ARNULFO Latham G. Casimir GULATI, HARVINDER is a 73yFemale , patient examined and chart reviewed.     INTERVAL HPI/ OVERNIGHT EVENTS:   Feels well. Midline placed.  Afebrile.    PAST MEDICAL & SURGICAL HISTORY:  HTN (hypertension)  Hypothyroid  Insomnia  GERD (gastroesophageal reflux disease)  HLD (hyperlipidemia)  Neuropathy    For details regarding the patient's social history, family history, and other miscellaneous elements, please refer the initial infectious diseases consultation and/or the admitting history and physical examination for this admission.      ROS:  CONSTITUTIONAL:  Negative fever  chills  EYES:  Negative  blurry vision or double vision  CARDIOVASCULAR:  Negative for chest pain or palpitations  RESPIRATORY:  Negative for cough, wheezing, or SOB   GASTROINTESTINAL:  Negative for nausea, vomiting, diarrhea, constipation, or abdominal pain  GENITOURINARY:  Negative frequency, urgency or dysuria + flank pain  NEUROLOGIC:  No headache, confusion, dizziness, lightheadedness  All other systems were reviewed and are negative     No Known Allergies      Current inpatient medications :    ANTIBIOTICS/RELEVANT:  ertapenem  IVPB      ertapenem  IVPB 1000 milliGRAM(s) IV Intermittent every 24 hours    MEDICATIONS  (STANDING):  amLODIPine   Tablet 10 milliGRAM(s) Oral daily  atorvastatin 10 milliGRAM(s) Oral at bedtime  enoxaparin Injectable 40 milliGRAM(s) SubCutaneous every 24 hours  gabapentin 300 milliGRAM(s) Oral two times a day  lactobacillus acidophilus 1 Tablet(s) Oral every 12 hours  levothyroxine 50 MICROGram(s) Oral daily  pantoprazole    Tablet 40 milliGRAM(s) Oral before breakfast  propranolol 20 milliGRAM(s) Oral at bedtime  senna 2 Tablet(s) Oral at bedtime    MEDICATIONS  (PRN):  acetaminophen     Tablet .. 650 milliGRAM(s) Oral every 6 hours PRN Temp greater or equal to 38C (100.4F), Mild Pain (1 - 3)  aluminum hydroxide/magnesium hydroxide/simethicone Suspension 30 milliLiter(s) Oral every 4 hours PRN Dyspepsia  hydrALAZINE 10 milliGRAM(s) Oral three times a day PRN Systolic blood pressure >160  magnesium hydroxide Suspension 30 milliLiter(s) Oral daily PRN Constipation  melatonin 3 milliGRAM(s) Oral at bedtime PRN Insomnia  ondansetron Injectable 4 milliGRAM(s) IV Push every 8 hours PRN Nausea and/or Vomiting  traMADol 50 milliGRAM(s) Oral three times a day PRN Moderate Pain (4 - 6)  zolpidem 5 milliGRAM(s) Oral at bedtime PRN Insomnia  zolpidem 5 milliGRAM(s) Oral at bedtime PRN Insomnia      Objective:  Vital Signs Last 24 Hrs  T(C): 36.6 (16 Jan 2024 08:47), Max: 36.9 (15 Benny 2024 14:23)  T(F): 97.9 (16 Jan 2024 08:47), Max: 98.4 (15 Benny 2024 14:23)  HR: 66 (16 Jan 2024 08:47) (63 - 77)  BP: 113/70 (16 Jan 2024 08:47) (113/70 - 149/82)  RR: 17 (16 Jan 2024 08:47) (16 - 18)  SpO2: 97% (16 Jan 2024 08:47) (97% - 99%)    Parameters below as of 16 Jan 2024 08:47  Patient On (Oxygen Delivery Method): room air        Physical Exam:  General: no acute distress  Neck: supple, trachea midline  Lungs: clear, no wheeze/rhonchi  Cardiovascular: regular rate and rhythm, S1 S2  Abdomen: soft, nontender,  bowel sounds normal  Neurological: alert and oriented x3  Skin: no rash  Extremities: no edema      LABS:                        12.0   6.29  )-----------( 404      ( 16 Jan 2024 07:52 )             37.2   01-16    137  |  100  |  14  ----------------------------<  105<H>  4.6   |  27  |  0.79    Ca    9.2      16 Jan 2024 07:52        MICROBIOLOGY:    Culture - Blood (collected 14 Jan 2024 06:15)  Source: .Blood Blood  Preliminary Report (15 Benny 2024 13:02):    No growth at 24 hours    Culture - Blood (collected 14 Jan 2024 06:15)  Source: .Blood Blood  Preliminary Report (15 Benny 2024 13:02):    No growth at 24 hours    Culture - Blood (collected 13 Jan 2024 16:49)  Source: .Blood Blood-Peripheral  Preliminary Report (15 Benny 2024 22:02):    No growth at 48 Hours    Culture - Blood (collected 11 Jan 2024 20:48)  Source: .Blood Blood-Peripheral  Gram Stain (13 Jan 2024 00:23):    Growth in aerobic bottle: Gram Negative Rods  Preliminary Report (13 Jan 2024 00:23):    Growth in aerobic bottle: Gram Negative Rods    Direct identification is available within approximately 3-5    hours either by Blood Panel Multiplexed PCR or Direct    MALDI-TOF. Details: [email or web address]  Organism: Blood Culture PCR (13 Jan 2024 02:01)  Organism: Blood Culture PCR (13 Jan 2024 02:01)      Method Type: PCR      -  Escherichia coli: Detec      -  ESBL: Detec      -  CTX-M Resistance Marker: Detec    RADIOLOGY & ADDITIONAL STUDIES:  < from: CT Abdomen and Pelvis w/ IV Cont (01.11.24 @ 21:27) >    ACC: [id number] EXAM:  CT ABDOMEN AND PELVIS IC   ORDERED BY: NAFISA DALE     PROCEDURE DATE:  01/11/2024          INTERPRETATION:  CLINICAL INFORMATION: Abdominal pain    COMPARISON: None.    CONTRAST/COMPLICATIONS:  IV Contrast: Omnipaque 350  90 cc administered   10 cc discarded  Oral Contrast: NONE  Complications: None reported at time of study completion    PROCEDURE:  CT of the Abdomen and Pelvis was performed.  Sagittal and coronal reformats were performed.    FINDINGS:  LOWER CHEST: Within normal limits.    LIVER: Within normal limits.  BILE DUCTS: Normal caliber.  GALLBLADDER: Within normal limits.  SPLEEN: Within normal limits.  PANCREAS: Within normal limits.  ADRENALS: Within normal limits.  KIDNEYS/URETERS: Heterogeneous enhancement of bilateral kidneys.   Bilateral urothelial enhancement. No drainable fluid collections. No   hydronephrosis.    BLADDER: Circumferential bladder wall thickening and ossicular   infiltration..  REPRODUCTIVE ORGANS: Uterus and adnexa within normal limits.    BOWEL: No bowel obstruction. Appendix is normal.  PERITONEUM: No ascites.  VESSELS: Within normal limits.  RETROPERITONEUM/LYMPH NODES: No lymphadenopathy.  ABDOMINAL WALL: Within normal limits.  BONES: Degenerative changes. Straightening of the lumbar lordosis.    IMPRESSION:  Bilateral pyelonephritis concomitant pyelitis and cystitis. No drainable   fluid collection.    Assessment :  74YO F PMH hypertension and hypothyroidism admitted with Ecoli ESBL bactreremia sec Acute Pyelonephritis  CT abd showed Bilateral pyelonephritis concomitant pyelitis and cystitis.   Repeat Bcx clear  Midline clear    Plan :   Continue Ertapenem x10 day course until 1/22\  Stable from ID standpoint  Dc home per primary team    Continue with present regiment.  Appropriate use of antibiotics and adverse effects reviewed.      > 35 minutes were spent in direct patient care reviewing notes, medications ,labs data/ imaging , discussion with multidisciplinary team.    Thank you for allowing me to participate in care of your patient .    James Elias MD  Infectious Disease  [phone] Covering [facility] DIVISION of INFECTIOUS DISEASE  LAY Kearns, ARNULFO Latham G. Casimir GULATI, HARVINDER is a 73yFemale , patient examined and chart reviewed.     INTERVAL HPI/ OVERNIGHT EVENTS:   Feels well. Midline placed.  Afebrile.    PAST MEDICAL & SURGICAL HISTORY:  HTN (hypertension)  Hypothyroid  Insomnia  GERD (gastroesophageal reflux disease)  HLD (hyperlipidemia)  Neuropathy    For details regarding the patient's social history, family history, and other miscellaneous elements, please refer the initial infectious diseases consultation and/or the admitting history and physical examination for this admission.      ROS:  CONSTITUTIONAL:  Negative fever  chills  EYES:  Negative  blurry vision or double vision  CARDIOVASCULAR:  Negative for chest pain or palpitations  RESPIRATORY:  Negative for cough, wheezing, or SOB   GASTROINTESTINAL:  Negative for nausea, vomiting, diarrhea, constipation, or abdominal pain  GENITOURINARY:  Negative frequency, urgency or dysuria + flank pain  NEUROLOGIC:  No headache, confusion, dizziness, lightheadedness  All other systems were reviewed and are negative     No Known Allergies      Current inpatient medications :    ANTIBIOTICS/RELEVANT:  ertapenem  IVPB      ertapenem  IVPB 1000 milliGRAM(s) IV Intermittent every 24 hours    MEDICATIONS  (STANDING):  amLODIPine   Tablet 10 milliGRAM(s) Oral daily  atorvastatin 10 milliGRAM(s) Oral at bedtime  enoxaparin Injectable 40 milliGRAM(s) SubCutaneous every 24 hours  gabapentin 300 milliGRAM(s) Oral two times a day  lactobacillus acidophilus 1 Tablet(s) Oral every 12 hours  levothyroxine 50 MICROGram(s) Oral daily  pantoprazole    Tablet 40 milliGRAM(s) Oral before breakfast  propranolol 20 milliGRAM(s) Oral at bedtime  senna 2 Tablet(s) Oral at bedtime    MEDICATIONS  (PRN):  acetaminophen     Tablet .. 650 milliGRAM(s) Oral every 6 hours PRN Temp greater or equal to 38C (100.4F), Mild Pain (1 - 3)  aluminum hydroxide/magnesium hydroxide/simethicone Suspension 30 milliLiter(s) Oral every 4 hours PRN Dyspepsia  hydrALAZINE 10 milliGRAM(s) Oral three times a day PRN Systolic blood pressure >160  magnesium hydroxide Suspension 30 milliLiter(s) Oral daily PRN Constipation  melatonin 3 milliGRAM(s) Oral at bedtime PRN Insomnia  ondansetron Injectable 4 milliGRAM(s) IV Push every 8 hours PRN Nausea and/or Vomiting  traMADol 50 milliGRAM(s) Oral three times a day PRN Moderate Pain (4 - 6)  zolpidem 5 milliGRAM(s) Oral at bedtime PRN Insomnia  zolpidem 5 milliGRAM(s) Oral at bedtime PRN Insomnia      Objective:  Vital Signs Last 24 Hrs  T(C): 36.6 (16 Jan 2024 08:47), Max: 36.9 (15 Benny 2024 14:23)  T(F): 97.9 (16 Jan 2024 08:47), Max: 98.4 (15 Benny 2024 14:23)  HR: 66 (16 Jan 2024 08:47) (63 - 77)  BP: 113/70 (16 Jan 2024 08:47) (113/70 - 149/82)  RR: 17 (16 Jan 2024 08:47) (16 - 18)  SpO2: 97% (16 Jan 2024 08:47) (97% - 99%)    Parameters below as of 16 Jan 2024 08:47  Patient On (Oxygen Delivery Method): room air        Physical Exam:  General: no acute distress  Neck: supple, trachea midline  Lungs: clear, no wheeze/rhonchi  Cardiovascular: regular rate and rhythm, S1 S2  Abdomen: soft, nontender,  bowel sounds normal  Neurological: alert and oriented x3  Skin: no rash  Extremities: no edema      LABS:                        12.0   6.29  )-----------( 404      ( 16 Jan 2024 07:52 )             37.2   01-16    137  |  100  |  14  ----------------------------<  105<H>  4.6   |  27  |  0.79    Ca    9.2      16 Jan 2024 07:52        MICROBIOLOGY:    Culture - Blood (collected 14 Jan 2024 06:15)  Source: .Blood Blood  Preliminary Report (15 Benny 2024 13:02):    No growth at 24 hours    Culture - Blood (collected 14 Jan 2024 06:15)  Source: .Blood Blood  Preliminary Report (15 Benny 2024 13:02):    No growth at 24 hours    Culture - Blood (collected 13 Jan 2024 16:49)  Source: .Blood Blood-Peripheral  Preliminary Report (15 Benny 2024 22:02):    No growth at 48 Hours    Culture - Blood (collected 11 Jan 2024 20:48)  Source: .Blood Blood-Peripheral  Gram Stain (13 Jan 2024 00:23):    Growth in aerobic bottle: Gram Negative Rods  Preliminary Report (13 Jan 2024 00:23):    Growth in aerobic bottle: Gram Negative Rods    Direct identification is available within approximately 3-5    hours either by Blood Panel Multiplexed PCR or Direct    MALDI-TOF. Details: [email or web address]  Organism: Blood Culture PCR (13 Jan 2024 02:01)  Organism: Blood Culture PCR (13 Jan 2024 02:01)      Method Type: PCR      -  Escherichia coli: Detec      -  ESBL: Detec      -  CTX-M Resistance Marker: Detec    RADIOLOGY & ADDITIONAL STUDIES:  < from: CT Abdomen and Pelvis w/ IV Cont (01.11.24 @ 21:27) >    ACC: [id number] EXAM:  CT ABDOMEN AND PELVIS IC   ORDERED BY: NAFISA DALE     PROCEDURE DATE:  01/11/2024          INTERPRETATION:  CLINICAL INFORMATION: Abdominal pain    COMPARISON: None.    CONTRAST/COMPLICATIONS:  IV Contrast: Omnipaque 350  90 cc administered   10 cc discarded  Oral Contrast: NONE  Complications: None reported at time of study completion    PROCEDURE:  CT of the Abdomen and Pelvis was performed.  Sagittal and coronal reformats were performed.    FINDINGS:  LOWER CHEST: Within normal limits.    LIVER: Within normal limits.  BILE DUCTS: Normal caliber.  GALLBLADDER: Within normal limits.  SPLEEN: Within normal limits.  PANCREAS: Within normal limits.  ADRENALS: Within normal limits.  KIDNEYS/URETERS: Heterogeneous enhancement of bilateral kidneys.   Bilateral urothelial enhancement. No drainable fluid collections. No   hydronephrosis.    BLADDER: Circumferential bladder wall thickening and ossicular   infiltration..  REPRODUCTIVE ORGANS: Uterus and adnexa within normal limits.    BOWEL: No bowel obstruction. Appendix is normal.  PERITONEUM: No ascites.  VESSELS: Within normal limits.  RETROPERITONEUM/LYMPH NODES: No lymphadenopathy.  ABDOMINAL WALL: Within normal limits.  BONES: Degenerative changes. Straightening of the lumbar lordosis.    IMPRESSION:  Bilateral pyelonephritis concomitant pyelitis and cystitis. No drainable   fluid collection.    Assessment :  74YO F PMH hypertension and hypothyroidism admitted with Ecoli ESBL bactreremia sec Acute Pyelonephritis  CT abd showed Bilateral pyelonephritis concomitant pyelitis and cystitis.   Repeat Bcx clear  Midline clear    Plan :   Continue Ertapenem x10 day course until 1/22\  Stable from ID standpoint  Dc home per primary team    Continue with present regiment.  Appropriate use of antibiotics and adverse effects reviewed.      > 35 minutes were spent in direct patient care reviewing notes, medications ,labs data/ imaging , discussion with multidisciplinary team.    Thank you for allowing me to participate in care of your patient .    James Elias MD  Infectious Disease  [phone]

## 2024-01-16 NOTE — PROGRESS NOTE ADULT - SUBJECTIVE AND OBJECTIVE BOX
PROGRESS NOTE  Patient is a 73y old  Female who presents with a chief complaint of dysuria , fever and left flank pain (16 Jan 2024 14:02)    Chart and available morning labs /imaging are reviewed electronically , urgent issues addressed . More information  is being added upon completion of rounds , when more information is collected and management discussed with consultants , medical staff and social service/case management on the floor   OVERNIGHT  No new issues reported by medical staff . All above noted Patient is resting in a bed comfortably .Cleared by ID for d/c .No distress noted   Spoke to the  at bedside   HPI:  Patient is a 73-year-old female with past medical history of hypertension and hypothyroidism presents with abdominal pain for days.  Patient reports diffuse abdominal pain rating to the left flank with dysuria.  Patient reports associated fever 101 Tmax for the past week.  Patient went to urgent care 2 days ago and was diagnosed with a UTI and given Bactrim which patient took 3 doses.  Patient took Tylenol for fever last dose was at 6 PM today.  Patient denies chest pain, shortness of breath, cough, nausea, vomiting, diarrhea, rash. CT abd showed Bilateral pyelonephritis concomitant pyelitis and cystitis. No drainable   fluid collection. Started on iv abx , septic workup sent , ID cons requested  (12 Jan 2024 06:44)    PAST MEDICAL & SURGICAL HISTORY:  HTN (hypertension)      Hypothyroid      Insomnia      GERD (gastroesophageal reflux disease)      HLD (hyperlipidemia)      Neuropathy          MEDICATIONS  (STANDING):  amLODIPine   Tablet 10 milliGRAM(s) Oral daily  atorvastatin 10 milliGRAM(s) Oral at bedtime  enoxaparin Injectable 40 milliGRAM(s) SubCutaneous every 24 hours  ertapenem  IVPB      ertapenem  IVPB 1000 milliGRAM(s) IV Intermittent every 24 hours  gabapentin 300 milliGRAM(s) Oral two times a day  lactobacillus acidophilus 1 Tablet(s) Oral every 12 hours  levothyroxine 50 MICROGram(s) Oral daily  pantoprazole    Tablet 40 milliGRAM(s) Oral before breakfast  propranolol 20 milliGRAM(s) Oral at bedtime  senna 2 Tablet(s) Oral at bedtime    MEDICATIONS  (PRN):  acetaminophen     Tablet .. 650 milliGRAM(s) Oral every 6 hours PRN Temp greater or equal to 38C (100.4F), Mild Pain (1 - 3)  aluminum hydroxide/magnesium hydroxide/simethicone Suspension 30 milliLiter(s) Oral every 4 hours PRN Dyspepsia  hydrALAZINE 10 milliGRAM(s) Oral three times a day PRN Systolic blood pressure >160  magnesium hydroxide Suspension 30 milliLiter(s) Oral daily PRN Constipation  melatonin 3 milliGRAM(s) Oral at bedtime PRN Insomnia  ondansetron Injectable 4 milliGRAM(s) IV Push every 8 hours PRN Nausea and/or Vomiting  traMADol 50 milliGRAM(s) Oral three times a day PRN Moderate Pain (4 - 6)  zolpidem 5 milliGRAM(s) Oral at bedtime PRN Insomnia  zolpidem 5 milliGRAM(s) Oral at bedtime PRN Insomnia      OBJECTIVE    T(C): 36.8 (01-16-24 @ 13:08), Max: 36.9 (01-15-24 @ 14:23)  HR: 69 (01-16-24 @ 13:08) (63 - 77)  BP: 126/78 (01-16-24 @ 13:08) (113/70 - 149/82)  RR: 18 (01-16-24 @ 13:08) (16 - 18)  SpO2: 97% (01-16-24 @ 13:08) (97% - 99%)  Wt(kg): --  I&O's Summary        REVIEW OF SYSTEMS:  CONSTITUTIONAL: No fever, weight loss, or fatigue  EYES: No eye pain, visual disturbances, or discharge  ENMT:   No sinus or throat pain  NECK: No pain or stiffness  RESPIRATORY: No cough, wheezing, chills or hemoptysis; No shortness of breath  CARDIOVASCULAR: No chest pain, palpitations, dizziness, or leg swelling  GASTROINTESTINAL: No abdominal pain. No nausea, vomiting; No diarrhea or constipation. No melena or hematochezia.  GENITOURINARY: No dysuria, frequency, hematuria, or incontinence  NEUROLOGICAL: No headaches, memory loss, loss of strength, numbness, or tremors  SKIN: No itching, burning, rashes, or lesions   MUSCULOSKELETAL: No joint pain or swelling; No muscle, back, or extremity pain    PHYSICAL EXAM:  Appearance: NAD. VS past 24 hrs -as above   HEENT:   Moist oral mucosa. Conjunctiva clear b/l.   Neck : supple  Respiratory: Lungs CTAB.  Gastrointestinal:  Soft, nontender. No rebound. No rigidity. BS present	  Cardiovascular: RRR ,S1S2 present  Neurologic: Non-focal. Moving all extremities.  Extremities: No edema. No erythema. No calf tenderness.  Skin: No rashes, No ecchymoses, No cyanosis.	  wounds ,skin lesions-See skin assesment flow sheet   LABS:                        12.0   6.29  )-----------( 404      ( 16 Jan 2024 07:52 )             37.2     01-16    137  |  100  |  14  ----------------------------<  105<H>  4.6   |  27  |  0.79    Ca    9.2      16 Jan 2024 07:52      CAPILLARY BLOOD GLUCOSE          Urinalysis Basic - ( 16 Jan 2024 07:52 )    Color: x / Appearance: x / SG: x / pH: x  Gluc: 105 mg/dL / Ketone: x  / Bili: x / Urobili: x   Blood: x / Protein: x / Nitrite: x   Leuk Esterase: x / RBC: x / WBC x   Sq Epi: x / Non Sq Epi: x / Bacteria: x        Culture - Blood (collected 14 Jan 2024 06:15)  Source: .Blood Blood  Preliminary Report (16 Jan 2024 13:02):    No growth at 48 Hours    Culture - Blood (collected 14 Jan 2024 06:15)  Source: .Blood Blood  Preliminary Report (16 Jan 2024 13:02):    No growth at 48 Hours    Culture - Blood (collected 13 Jan 2024 16:49)  Source: .Blood Blood-Peripheral  Preliminary Report (15 Benny 2024 22:02):    No growth at 48 Hours    Culture - Urine (collected 11 Jan 2024 22:44)  Source: Clean Catch Clean Catch (Midstream)  Final Report (15 Benny 2024 11:02):    10,000 - 49,000 CFU/mL Escherichia coli ESBL  Organism: Escherichia coli ESBL (15 Benny 2024 11:02)  Organism: Escherichia coli ESBL (15 Benny 2024 11:02)    Culture - Blood (collected 11 Jan 2024 20:48)  Source: .Blood Blood-Peripheral  Gram Stain (15 Benny 2024 06:06):    Growth in anaerobic bottle: Gram Negative Rods  Final Report (16 Jan 2024 07:30):    Growth in aerobic and anaerobic bottles: Escherichia coli ESBL    See previous culture [id number]    Culture - Blood (collected 11 Jan 2024 20:48)  Source: .Blood Blood-Peripheral  Gram Stain (13 Jan 2024 00:23):    Growth in aerobic bottle: Gram Negative Rods  Final Report (14 Jan 2024 16:48):    Growth in aerobic bottle: Escherichia coli ESBL    Direct identification is available within approximately 3-5    hours either by Blood Panel Multiplexed PCR or Direct    MALDI-TOF. Details: [email or web address]  Organism: Blood Culture PCR  Escherichia coli ESBL (14 Jan 2024 16:48)  Organism: Escherichia coli ESBL (14 Jan 2024 16:48)  Organism: Blood Culture PCR (14 Jan 2024 16:48)      RADIOLOGY & ADDITIONAL TESTS:   reviewed elctronically  ASSESSMENT/PLAN: 	    Patient was seen and examined on a day of discharge . Plan of care , discharge medications and recommendations discussed with consultants and clearance for discharge obtained .Social service , case management  and medical staff are aware of plan. Family is notified. Discharge summary  is  prepared electronically-see separate document prepared by me .55minutes spent on this visit, 50% visit time spent in care co-ordination with other attendings and counselling patient  I have discussed care plan with patient and HCP,expressed understanding of problems treatment and their effect and side effects, alternatives in detail,I have asked if they have any questions and concerns and appropriately addressed them to best of my ability  PROGRESS NOTE  Patient is a 73y old  Female who presents with a chief complaint of dysuria , fever and left flank pain (16 Jan 2024 14:02)    Chart and available morning labs /imaging are reviewed electronically , urgent issues addressed . More information  is being added upon completion of rounds , when more information is collected and management discussed with consultants , medical staff and social service/case management on the floor   OVERNIGHT  No new issues reported by medical staff . All above noted Patient is resting in a bed comfortably .Cleared by ID for d/c .No distress noted   Spoke to the  at bedside   HPI:  Patient is a 73-year-old female with past medical history of hypertension and hypothyroidism presents with abdominal pain for days.  Patient reports diffuse abdominal pain rating to the left flank with dysuria.  Patient reports associated fever 101 Tmax for the past week.  Patient went to urgent care 2 days ago and was diagnosed with a UTI and given Bactrim which patient took 3 doses.  Patient took Tylenol for fever last dose was at 6 PM today.  Patient denies chest pain, shortness of breath, cough, nausea, vomiting, diarrhea, rash. CT abd showed Bilateral pyelonephritis concomitant pyelitis and cystitis. No drainable   fluid collection. Started on iv abx , septic workup sent , ID cons requested  (12 Jan 2024 06:44)    PAST MEDICAL & SURGICAL HISTORY:  HTN (hypertension)      Hypothyroid      Insomnia      GERD (gastroesophageal reflux disease)      HLD (hyperlipidemia)      Neuropathy          MEDICATIONS  (STANDING):  amLODIPine   Tablet 10 milliGRAM(s) Oral daily  atorvastatin 10 milliGRAM(s) Oral at bedtime  enoxaparin Injectable 40 milliGRAM(s) SubCutaneous every 24 hours  ertapenem  IVPB      ertapenem  IVPB 1000 milliGRAM(s) IV Intermittent every 24 hours  gabapentin 300 milliGRAM(s) Oral two times a day  lactobacillus acidophilus 1 Tablet(s) Oral every 12 hours  levothyroxine 50 MICROGram(s) Oral daily  pantoprazole    Tablet 40 milliGRAM(s) Oral before breakfast  propranolol 20 milliGRAM(s) Oral at bedtime  senna 2 Tablet(s) Oral at bedtime    MEDICATIONS  (PRN):  acetaminophen     Tablet .. 650 milliGRAM(s) Oral every 6 hours PRN Temp greater or equal to 38C (100.4F), Mild Pain (1 - 3)  aluminum hydroxide/magnesium hydroxide/simethicone Suspension 30 milliLiter(s) Oral every 4 hours PRN Dyspepsia  hydrALAZINE 10 milliGRAM(s) Oral three times a day PRN Systolic blood pressure >160  magnesium hydroxide Suspension 30 milliLiter(s) Oral daily PRN Constipation  melatonin 3 milliGRAM(s) Oral at bedtime PRN Insomnia  ondansetron Injectable 4 milliGRAM(s) IV Push every 8 hours PRN Nausea and/or Vomiting  traMADol 50 milliGRAM(s) Oral three times a day PRN Moderate Pain (4 - 6)  zolpidem 5 milliGRAM(s) Oral at bedtime PRN Insomnia  zolpidem 5 milliGRAM(s) Oral at bedtime PRN Insomnia      OBJECTIVE    T(C): 36.8 (01-16-24 @ 13:08), Max: 36.9 (01-15-24 @ 14:23)  HR: 69 (01-16-24 @ 13:08) (63 - 77)  BP: 126/78 (01-16-24 @ 13:08) (113/70 - 149/82)  RR: 18 (01-16-24 @ 13:08) (16 - 18)  SpO2: 97% (01-16-24 @ 13:08) (97% - 99%)  Wt(kg): --  I&O's Summary        REVIEW OF SYSTEMS:  CONSTITUTIONAL: No fever, weight loss, or fatigue  EYES: No eye pain, visual disturbances, or discharge  ENMT:   No sinus or throat pain  NECK: No pain or stiffness  RESPIRATORY: No cough, wheezing, chills or hemoptysis; No shortness of breath  CARDIOVASCULAR: No chest pain, palpitations, dizziness, or leg swelling  GASTROINTESTINAL: No abdominal pain. No nausea, vomiting; No diarrhea or constipation. No melena or hematochezia.  GENITOURINARY: No dysuria, frequency, hematuria, or incontinence  NEUROLOGICAL: No headaches, memory loss, loss of strength, numbness, or tremors  SKIN: No itching, burning, rashes, or lesions   MUSCULOSKELETAL: No joint pain or swelling; No muscle, back, or extremity pain    PHYSICAL EXAM:  Appearance: NAD. VS past 24 hrs -as above   HEENT:   Moist oral mucosa. Conjunctiva clear b/l.   Neck : supple  Respiratory: Lungs CTAB.  Gastrointestinal:  Soft, nontender. No rebound. No rigidity. BS present	  Cardiovascular: RRR ,S1S2 present  Neurologic: Non-focal. Moving all extremities.  Extremities: No edema. No erythema. No calf tenderness.  Skin: No rashes, No ecchymoses, No cyanosis.	  wounds ,skin lesions-See skin assesment flow sheet   LABS:                        12.0   6.29  )-----------( 404      ( 16 Jan 2024 07:52 )             37.2     01-16    137  |  100  |  14  ----------------------------<  105<H>  4.6   |  27  |  0.79    Ca    9.2      16 Jan 2024 07:52      CAPILLARY BLOOD GLUCOSE          Urinalysis Basic - ( 16 Jan 2024 07:52 )    Color: x / Appearance: x / SG: x / pH: x  Gluc: 105 mg/dL / Ketone: x  / Bili: x / Urobili: x   Blood: x / Protein: x / Nitrite: x   Leuk Esterase: x / RBC: x / WBC x   Sq Epi: x / Non Sq Epi: x / Bacteria: x        Culture - Blood (collected 14 Jan 2024 06:15)  Source: .Blood Blood  Preliminary Report (16 Jan 2024 13:02):    No growth at 48 Hours    Culture - Blood (collected 14 Jan 2024 06:15)  Source: .Blood Blood  Preliminary Report (16 Jan 2024 13:02):    No growth at 48 Hours    Culture - Blood (collected 13 Jan 2024 16:49)  Source: .Blood Blood-Peripheral  Preliminary Report (15 Benny 2024 22:02):    No growth at 48 Hours    Culture - Urine (collected 11 Jan 2024 22:44)  Source: Clean Catch Clean Catch (Midstream)  Final Report (15 Benny 2024 11:02):    10,000 - 49,000 CFU/mL Escherichia coli ESBL  Organism: Escherichia coli ESBL (15 Benny 2024 11:02)  Organism: Escherichia coli ESBL (15 Benny 2024 11:02)    Culture - Blood (collected 11 Jan 2024 20:48)  Source: .Blood Blood-Peripheral  Gram Stain (15 Benny 2024 06:06):    Growth in anaerobic bottle: Gram Negative Rods  Final Report (16 Jan 2024 07:30):    Growth in aerobic and anaerobic bottles: Escherichia coli ESBL    See previous culture [id number]    Culture - Blood (collected 11 Jan 2024 20:48)  Source: .Blood Blood-Peripheral  Gram Stain (13 Jan 2024 00:23):    Growth in aerobic bottle: Gram Negative Rods  Final Report (14 Jan 2024 16:48):    Growth in aerobic bottle: Escherichia coli ESBL    Direct identification is available within approximately 3-5    hours either by Blood Panel Multiplexed PCR or Direct    MALDI-TOF. Details: [email or web address]  Organism: Blood Culture PCR  Escherichia coli ESBL (14 Jan 2024 16:48)  Organism: Escherichia coli ESBL (14 Jan 2024 16:48)  Organism: Blood Culture PCR (14 Jan 2024 16:48)      RADIOLOGY & ADDITIONAL TESTS:   reviewed elctronically  ASSESSMENT/PLAN: 	    Patient was seen and examined on a day of discharge . Plan of care , discharge medications and recommendations discussed with consultants and clearance for discharge obtained .Social service , case management  and medical staff are aware of plan. Family is notified. Discharge summary  is  prepared electronically-see separate document prepared by me .55minutes spent on this visit, 50% visit time spent in care co-ordination with other attendings and counselling patient  I have discussed care plan with patient and HCP,expressed understanding of problems treatment and their effect and side effects, alternatives in detail,I have asked if they have any questions and concerns and appropriately addressed them to best of my ability

## 2024-01-16 NOTE — CAREGIVER ENGAGEMENT NOTE - CAREGIVER EDUCATION NOTES - FREE TEXT
RN/CM notified by staff and ID MD that pt is planned for DC home with LT IV infusion Ertapenem 1 gram daily until 01/22/2024. CM noted LUE midline placed today 01/16/2024. CM has met with pt and family @ bedside, with sonYamil the designated caregiver and contact [phone]. CM explained home care services/infusion services/expectations (leena regarding pt/family to be taught about home infusion), insurance provisions, choices of agencies. Family opted for [facility] [phone]. CM referred case accordingly with plan for DC today 01/16/2024, requested start of care 01/17/2024. CM apprised ID MD of infusion company so ID MD could call in RX for IV ABT and Labs- ID MD confirmed that she has called it in. CM has verified visit address with family- [facility] [address] [phone], community MD is DR Aly Marques [phone]. Family has requested that CM request for home health aide services- CM has explained insurance provisions for said services and explained to family that this request is NOT a guarantee of services. Family stated that pt will be transported home by family upon DC, aware that pt is planned for DC later today. CM remains available and continues to follow case. RN/CM notified by staff and ID MD that pt is planned for DC home with LT IV infusion Ertapenem 1 gram daily until 01/22/2024. CM noted LUE midline placed today 01/16/2024. CM has met with pt and family @ bedside, with sonYamil the designated caregiver and contact [phone]. CM explained home care services/infusion services/expectations (leena regarding pt/family to be taught about home infusion), insurance provisions, choices of agencies. Family opted for [facility] [phone]. CM referred case accordingly with plan for DC today 01/16/2024, requested start of care 01/17/2024. CM apprised ID MD of infusion company so ID MD could call in RX for IV ABT and Labs- ID MD confirmed that she has called it in. CM has verified visit address with family- [facility] [address] [phone], community MD is DR Aly Marques [phone]. Family has requested that CM request for home health aide services- CM has explained insurance provisions for said services and explained to family that this request is NOT a guarantee of services. Family stated that pt will be transported home by family upon DC, aware that pt is planned for DC later today. CM remains available and continues to follow case.

## 2024-01-16 NOTE — DISCHARGE NOTE NURSING/CASE MANAGEMENT/SOCIAL WORK - NSDCPEFALRISK_GEN_ALL_CORE
For information on Fall & Injury Prevention, visit: [email or web address] OR  [email or web address] OR  https://www.cdc.gov/steadi/patient.html For information on Fall & Injury Prevention, visit: [email or web address] OR  [email or web address] OR  https://www.cdc.gov/steadi/patient.html

## 2024-01-16 NOTE — PROGRESS NOTE ADULT - ASSESSMENT
REASON FOR VISIT  .. Management of problems listed below        REVIEW OF SYMPTOMS   Able to give ROS  Yes     RELIABILITY +/-   CONSTITUTIONAL Weakness Yes    ENDOCRINE  No heat or cold intolerance    ALLERGY No hives  Sore throat No stridor  RESP Shortness of breath YES   NEURO New weakness No   CARDIAC   Palpitations No         PHYSICAL EXAM    HEENT Unremarkable  atraumatic   RESP Fair air entry  Harsh breath sound   CARDIAC S1 S2 No S3     NO JVD    ABDOMEN No hepatosplenomegaly   PEDAL EDEMA present No calf tenderness  NO rash       GENERAL DATA .   GOC.  ..  1/12/2024 full code   ICU STAY.  .. no   COVID. .. 1/11 (-)       BEST PRACTICE ISSUES.    HOB ELEVATN.  .. Yes  DVT PPLX.  ..   1/11 lvnx 40         DIET.   ..  1/11 dash       ALLGY. ..     nka  WT. ..   1/11/2024 83  BMI. ..   1/11/2024 31    . SUMMARY.     . 73 f PM[facility] hypothyroid admitted 1/11/2024 with pain abdomen several days diffuse pain more l flank with dysuria and fever 101 CT cw bl pyelonephritis started on rocephin 1/11/2024   . Pulm Crit Care consulted 1/11/2024 for sepsis  . 1/11 bc grew E coli esbl ctx m (+) and escalated to ertapenem      . OVERALL PLAN.  . . E COLI BACTEREMIA 1/11 ESBL (+)Ctx M (+)    . Pyelonephritis  .. Rocephin -> ertapenem   .. 1/14 bc (-)     . RML ATELECTASIS 1/11 CXR   .. CT ch 1/12/2024   .... no consolidatn  .... subsegmental lingular and rml atelectasis   .. 1/13/2024 dw son bedside advised repeat ct 2 m Given my office card     . ACTIVE ISSUE(S).  .. Rx . E COLI BACTEREMIA 1/11 ESBL (+)Ctx M (+)  pyelonephritis with ertapenem started 1/13/2024    TIME SPENT.  . Over 36 minutes aggregate care time spent on encounter; activities included   direct patient care, counseling and/or coordinating care reviewing notes, lab data/ imaging , discussion with multidisciplinary team/ patient  /family and explaining in detail risks, benefits, alternatives  of the recommendations     PATIENT.  . ELO BERGMAN 73 f 1/11/2024 1951 DR EDMOND GASCA

## 2024-01-16 NOTE — PROGRESS NOTE ADULT - PROVIDER SPECIALTY LIST ADULT
Infectious Disease
Infectious Disease
Pulmonology
Hospitalist
Infectious Disease
Infectious Disease
Hospitalist

## 2024-01-16 NOTE — DISCHARGE NOTE PROVIDER - NSDCMRMEDTOKEN_GEN_ALL_CORE_FT
acetaminophen 325 mg oral tablet: 2 tab(s) orally every 6 hours As needed Temp greater or equal to 38C (100.4F), Mild Pain (1 - 3)  Ambien 10 mg oral tablet: 1 tab(s) orally once a day (at bedtime)  amLODIPine 10 mg oral tablet: 1 tab(s) orally once a day  atorvastatin 10 mg oral tablet: 1 tab(s) orally once a day (at bedtime)  ertapenem 1 g injection: 1 gram(s) intravenous every 24 hours last day 01/22/24  gabapentin 300 mg oral capsule: 1 cap(s) orally 2 times a day  lactobacillus acidophilus oral tablet: 2 tab(s) orally once a day  levothyroxine 50 mcg (0.05 mg) oral tablet: 1 tab(s) orally once a day  pantoprazole 20 mg oral delayed release tablet: 1 tab(s) orally once a day (at bedtime)  propranolol 20 mg oral tablet: 1 tab(s) orally once a day (at bedtime)  senna leaf extract oral tablet: 2 tab(s) orally once a day (at bedtime)

## 2024-01-16 NOTE — DISCHARGE NOTE NURSING/CASE MANAGEMENT/SOCIAL WORK - NSSCNAMETXT_GEN_ALL_CORE
[facility] [phone] for for home care infusion teachings- RN will visit you to provide you with teachings on IV home infusion, service will start with next dose of infusion after your hospital discharge  [facility] [phone] for for home care infusion teachings- RN will visit you to provide you with teachings on IV home infusion, service will start with next dose of infusion after your hospital discharge

## 2024-01-16 NOTE — DISCHARGE NOTE PROVIDER - NSDCCAREPROVSEEN_GEN_ALL_CORE_FT
Curt, James Hernandez, More Galvez, Renee Carrington, Silvia Mac, Kim Delaney, Eleazar Hernandez, Jamie

## 2024-01-16 NOTE — DISCHARGE NOTE NURSING/CASE MANAGEMENT/SOCIAL WORK - PATIENT PORTAL LINK FT
You can access the FollowMyHealth Patient Portal offered by [facility] by registering at the following website: [email or web address]. By joining [facility]’s FollowMyHealth portal, you will also be able to view your health information using other applications (apps) compatible with our system. You can access the FollowMyHealth Patient Portal offered by [facility] by registering at the following website: [email or web address]. By joining [facility]’s FollowMyHealth portal, you will also be able to view your health information using other applications (apps) compatible with our system.

## 2024-01-16 NOTE — PROGRESS NOTE ADULT - REASON FOR ADMISSION
dysuria , fever and left flank pain

## 2024-01-16 NOTE — CASE MANAGEMENT PROGRESS NOTE - NSCMPROGRESSNOTE_GEN_ALL_CORE
RN/CM notified by staff and ID MD that pt is planned for DC home with LT IV infusion Ertapenem 1 gram daily until 01/22/2024. CM noted LUE midline placed today 01/16/2024. CM has met with pt and family @ bedside, with sonYamil the designated caregiver and contact [phone]. CM explained home care services/infusion services/expectations (leena regarding pt/family to be taught about home infusion), insurance provisions, choices of agencies. Family opted for [facility] [phone]. CM referred case accordingly with plan for DC today 01/16/2024, requested start of care 01/17/2024. CM apprised ID MD of infusion company so ID MD could call in RX for IV ABT and Labs- ID MD confirmed that she has called it in. CM has verified visit address with family- pharmacy is [facility] [address] [phone], community MD is DR Aly Marques [phone]. Family has requested that CM request for home health aide services- CM has explained insurance provisions for said services and explained to family that this request is NOT a guarantee of services. Family stated that pt will be transported home by family upon DC. CM remains available and continues to follow case.  RN/CM notified by staff and ID MD that pt is planned for DC home with LT IV infusion Ertapenem 1 gram daily until 01/22/2024. CM noted LUE midline placed today 01/16/2024. CM has met with pt and family @ bedside, with sonYamil the designated caregiver and contact [phone]. CM explained home care services/infusion services/expectations (leena regarding pt/family to be taught about home infusion), insurance provisions, choices of agencies. Family opted for [facility] [phone]. CM referred case accordingly with plan for DC today 01/16/2024, requested start of care 01/17/2024. CM apprised ID MD of infusion company so ID MD could call in RX for IV ABT and Labs- ID MD confirmed that she has called it in. CM has verified visit address with family- pharmacy is [facility] [address] [phone], community MD is DR Aly Marques [phone]. Family has requested that CM request for home health aide services- CM has explained insurance provisions for said services and explained to family that this request is NOT a guarantee of services. Family stated that pt will be transported home by family upon DC. CM remains available and continues to follow case.  RN/CM notified by staff and ID MD that pt is planned for DC home with LT IV infusion Ertapenem 1 gram daily until 01/22/2024. CM noted LUE midline placed today 01/16/2024. CM has met with pt and family @ bedside, with sonYamil the designated caregiver and contact [phone]. CM explained home care services/infusion services/expectations (leena regarding pt/family to be taught about home infusion), insurance provisions, choices of agencies. Family opted for [facility]fusion [phone]. CM referred case accordingly with plan for DC today 01/16/2024, requested start of care 01/17/2024. CM apprised ID MD of infusion company so ID MD could call in RX for IV ABT and Labs- ID MD confirmed that she has called it in. CM has verified visit address with family- [facility] [address] [place] [phone], community MD is DR Aly Marques [phone]. Family has requested that CM request for home health aide services- CM has explained insurance provisions for said services and explained to family that this request is NOT a guarantee of services. CM sent referral to [facility] [place] [phone]/ [phone], awaiting response. Family stated that pt will be transported home by family upon DC. CM remains available and continues to follow case.  RN/CM notified by staff and ID MD that pt is planned for DC home with LT IV infusion Ertapenem 1 gram daily until 01/22/2024. CM noted LUE midline placed today 01/16/2024. CM has met with pt and family @ bedside, with sonYamil the designated caregiver and contact [phone]. CM explained home care services/infusion services/expectations (leena regarding pt/family to be taught about home infusion), insurance provisions, choices of agencies. Family opted for [facility]fusion [phone]. CM referred case accordingly with plan for DC today 01/16/2024, requested start of care 01/17/2024. CM apprised ID MD of infusion company so ID MD could call in RX for IV ABT and Labs- ID MD confirmed that she has called it in. CM has verified visit address with family- [facility] [place] [phone], community MD is DR Aly Marques [phone]. Family has requested that CM request for home health aide services- CM has explained insurance provisions for said services and explained to family that this request is NOT a guarantee of services. CM sent referral to [facility] [place] [phone]/ [phone], awaiting response. Family stated that pt will be transported home by family upon DC. CM remains available and continues to follow case.  RN/CM notified by staff and ID MD that pt is planned for DC home with LT IV infusion Ertapenem 1 gram daily until 01/22/2024. CM noted LUE midline placed today 01/16/2024. CM has met with pt and family @ bedside, with sonYamil the designated caregiver and contact [phone]. CM explained home care services/infusion services/expectations (leena regarding pt/family to be taught about home infusion), insurance provisions, choices of agencies. Family opted for [facility] [phone]. CM referred case accordingly with plan for DC today 01/16/2024, requested start of care 01/17/2024. CM apprised ID MD of infusion [employer] so ID MD could call in RX for IV ABT and Labs- ID MD confirmed that she has called it in. CM has verified visit address with family- [facility] [phone], community MD is DR Aly Marques [phone]. Family has requested that CM request for home health aide services- CM has explained insurance provisions for said services and explained to family that this request is NOT a guarantee of services. CM sent referral to [facility] [phone]/ [phone], awaiting response. Family stated that pt will be transported home by family upon DC. CM remains available and continues to follow case.     ADDENDUM: RN/DIDI received notification from [facility] [place] [phone]/ [phone] that @ this time, there is NO skilled need certified home care services and therefore unable to accept case for HHA only. CM reached out to sonYamil, left message explaining above. CM has confirmed with [facility] [phone] that pt's case is accepted with start of care 01/17/2024 and that this has been coordinated with son.  RN/CM notified by staff and ID MD that pt is planned for DC home with LT IV infusion Ertapenem 1 gram daily until 01/22/2024. CM noted LUE midline placed today 01/16/2024. CM has met with pt and family @ bedside, with sonYamil the designated caregiver and contact [phone]. CM explained home care services/infusion services/expectations (leena regarding pt/family to be taught about home infusion), insurance provisions, choices of agencies. Family opted for [facility] [phone]. CM referred case accordingly with plan for DC today 01/16/2024, requested start of care 01/17/2024. CM apprised ID MD of infusion [employer] so ID MD could call in RX for IV ABT and Labs- ID MD confirmed that she has called it in. CM has verified visit address with family- [facility] [phone], community MD is DR Aly Marques [phone]. Family has requested that CM request for home health aide services- CM has explained insurance provisions for said services and explained to family that this request is NOT a guarantee of services. CM sent referral to [facility] [phone]/ [phone], awaiting response. Family stated that pt will be transported home by family upon DC. CM remains available and continues to follow case.     ADDENDUM: RN/DIDI received notification from [facility] @ [place] [phone]/ [phone] that @ this time, there is NO skilled need certified home care services and therefore unable to accept case for HHA only. CM reached out to sonYamil, left message explaining above. CM has confirmed with [facility] [phone] that pt's case is accepted with start of care 01/17/2024 and that this has been coordinated with son.  RN/DIDI notified by staff and ID MD that pt is planned for DC home with LT IV infusion Ertapenem 1 gram daily until 01/22/2024. CM noted LUE midline placed today 01/16/2024. CM has met with pt and family @ bedside, with sonYamil the designated caregiver and contact [phone]. CM explained home care services/infusion services/expectations (leena regarding pt/family to be taught about home infusion), insurance provisions, choices of agencies. Family opted for [facility] [phone]. CM referred case accordingly with plan for DC today 01/16/2024, requested start of care 01/17/2024. CM apprised ID MD of infusion company so ID MD could call in RX for IV ABT and Labs- ID MD confirmed that she has called it in. CM has verified visit address with family- [facility] [address] [phone], community MD is DR Aly Marques [phone]. Family has requested that CM request for home health aide services- CM has explained insurance provisions for said services and explained to family that this request is NOT a guarantee of services. CM sent referral to [facility] [phone]/ [phone], awaiting response. Family stated that pt will be transported home by family upon DC. CM remains available and continues to follow case.     ADDENDUM: RN/DIDI received notification from [facility] [phone]/ [phone] that @ this time, there is NO skilled need certified home care services and therefore unable to accept case for HHA only. CM reached out to sonYamil, left message explaining above. CM has confirmed with [facility] [phone] that pt's case is accepted with start of care 01/17/2024 and that this has been coordinated with son. RN/DIDI has provided pt's spouse @ bedside with contact info for Kim Duron MD, DR [phone] to set-up follow-up appt within 1 week @ [address] [place] [address], [place] [address].   RN/DIDI notified by staff and ID MD that pt is planned for DC home with LT IV infusion Ertapenem 1 gram daily until 01/22/2024. CM noted LUE midline placed today 01/16/2024. CM has met with pt and family @ bedside, with sonYamil the designated caregiver and contact [phone]. CM explained home care services/infusion services/expectations (leena regarding pt/family to be taught about home infusion), insurance provisions, choices of agencies. Family opted for [facility]fusion [phone]. CM referred case accordingly with plan for DC today 01/16/2024, requested start of care 01/17/2024. CM apprised ID MD of infusion company so ID MD could call in RX for IV ABT and Labs- ID MD confirmed that she has called it in. CM has verified visit address with family- [facility] [phone], community MD is DR Aly Marques [phone]. Family has requested that CM request for home health aide services- CM has explained insurance provisions for said services and explained to family that this request is NOT a guarantee of services. CM sent referral to [facility] [phone]/ [phone], awaiting response. Family stated that pt will be transported home by family upon DC. CM remains available and continues to follow case.     ADDENDUM: RN/DIDI received notification from [facility] [phone]/ [phone] that @ this time, there is NO skilled need certified home care services and therefore unable to accept case for HHA only. CM reached out to sonYamil, left message explaining above. CM has confirmed with [facility] [phone] that pt's case is accepted with start of care 01/17/2024 and that this has been coordinated with son. RN/DIDI has provided pt's spouse @ bedside with contact info for Kim Duron MD, DR [phone] to set-up follow-up appt within 1 week @ [address] [place] [address], [place] [address].

## 2024-01-16 NOTE — DISCHARGE NOTE PROVIDER - NSDCCPCAREPLAN_GEN_ALL_CORE_FT
PRINCIPAL DISCHARGE DIAGNOSIS  Diagnosis: Gram-negative sepsis, unspecified  Assessment and Plan of Treatment: with e.coli ESBL POA      SECONDARY DISCHARGE DIAGNOSES  Diagnosis: Acute pyelonephritis  Assessment and Plan of Treatment:     Diagnosis: HTN (hypertension)  Assessment and Plan of Treatment:     Diagnosis: GERD (gastroesophageal reflux disease)  Assessment and Plan of Treatment:     Diagnosis: Insomnia  Assessment and Plan of Treatment:     Diagnosis: Abnormal screening mammogram  Assessment and Plan of Treatment:     Diagnosis: Neuropathy  Assessment and Plan of Treatment:     Diagnosis: Hypothyroid  Assessment and Plan of Treatment:

## 2024-01-17 ENCOUNTER — APPOINTMENT (OUTPATIENT)
Dept: ORTHOPEDIC SURGERY | Facility: CLINIC | Age: 74
End: 2024-01-17

## 2024-01-18 LAB
CULTURE RESULTS: SIGNIFICANT CHANGE UP
SPECIMEN SOURCE: SIGNIFICANT CHANGE UP

## 2024-01-19 PROBLEM — G47.00 INSOMNIA, UNSPECIFIED: Chronic | Status: ACTIVE | Noted: 2024-01-12

## 2024-01-19 PROBLEM — K21.9 GASTRO-ESOPHAGEAL REFLUX DISEASE WITHOUT ESOPHAGITIS: Chronic | Status: ACTIVE | Noted: 2024-01-12

## 2024-01-19 PROBLEM — I10 ESSENTIAL (PRIMARY) HYPERTENSION: Chronic | Status: ACTIVE | Noted: 2024-01-11

## 2024-01-19 PROBLEM — G62.9 POLYNEUROPATHY, UNSPECIFIED: Chronic | Status: ACTIVE | Noted: 2024-01-12

## 2024-01-19 PROBLEM — E03.9 HYPOTHYROIDISM, UNSPECIFIED: Chronic | Status: ACTIVE | Noted: 2024-01-11

## 2024-01-19 PROBLEM — E78.5 HYPERLIPIDEMIA, UNSPECIFIED: Chronic | Status: ACTIVE | Noted: 2024-01-12

## 2024-01-19 LAB
CULTURE RESULTS: SIGNIFICANT CHANGE UP
CULTURE RESULTS: SIGNIFICANT CHANGE UP
SPECIMEN SOURCE: SIGNIFICANT CHANGE UP
SPECIMEN SOURCE: SIGNIFICANT CHANGE UP

## 2024-01-22 ENCOUNTER — APPOINTMENT (OUTPATIENT)
Dept: ORTHOPEDIC SURGERY | Facility: CLINIC | Age: 74
End: 2024-01-22
Payer: MEDICAID

## 2024-01-22 VITALS — WEIGHT: 180 LBS | HEIGHT: 63 IN | BODY MASS INDEX: 31.89 KG/M2

## 2024-01-22 DIAGNOSIS — M51.37 OTHER INTERVERTEBRAL DISC DEGENERATION, LUMBOSACRAL REGION: ICD-10-CM

## 2024-01-22 PROCEDURE — 99214 OFFICE O/P EST MOD 30 MIN: CPT

## 2024-01-22 RX ORDER — DICLOFENAC SODIUM 50 MG/1
50 TABLET, DELAYED RELEASE ORAL
Qty: 30 | Refills: 2 | Status: ACTIVE | COMMUNITY
Start: 2023-10-04 | End: 1900-01-01

## 2024-01-22 RX ORDER — TIZANIDINE 2 MG/1
2 TABLET ORAL EVERY 8 HOURS
Qty: 30 | Refills: 0 | Status: ACTIVE | COMMUNITY
Start: 2023-10-04 | End: 1900-01-01

## 2024-01-22 NOTE — HISTORY OF PRESENT ILLNESS
[Walking] : walking [7] : a current pain level of 7/10 [Sitting] : sitting [Standing] : standing [Daily] : ~He/She~ states the symptoms seem to be occuring daily [Rest] : relieved by rest [Stable] : stable [de-identified] : Patient is here today for re evaluation on her cervical and lumbar spine pain. Patient is going for physical therapy which seems to be helping with her neck symptoms but not her low back issues.  Hospiralized for 5 days for renal infection and bacteremia - discharged 6 days ago, PICC line d/.cd today.

## 2024-01-22 NOTE — PHYSICAL EXAM
[Normal] : Gait: normal [UE] : Sensory: Intact in bilateral upper extremities [1+] : left brachioradialis 1+ [Rad] : radial 2+ and symmetric bilaterally [Paredes's Sign] : negative Paredes's sign [Plantar Reflex Right Only] : absent on the right [Plantar Reflex Left Only] : absent on the left [DTR Reflexes Clonus Of Right Ankle (___ Beats)] : absent on the right [DTR Reflexes Clonus Of Left Ankle (___ Beats)] : absent on the left [de-identified] : seen with her son Range of motion of the cervical spine is chin to chest with discomfort, extension is 20 degrees with more neck pain left and right lateral rotation is 30 degrees with discomfort at end range of motion. Paraspinal cervical tenderness.  Left more than right supraspinatus tenderness

## 2024-01-22 NOTE — REASON FOR VISIT
[Follow-Up Visit] : a follow-up visit for [Degenerative Joint Disease] : degenerative joint disease [Back Pain] : back pain [Neck Pain] : neck pain [Family Member] : family member

## 2024-01-22 NOTE — DISCUSSION/SUMMARY
[Medication Risks Reviewed] : Medication risks reviewed [de-identified] : Since her last visit in October 2023 the patient completed a course of diclofenac and methocarbamol with improvement of symptoms of her cervical spine.  However she continues to have low back pain with some pain radiating down the her left leg now.  Previous MRI of the cervical spine did not reveal any significant spinal cord compression.  However MRI has not been performed of her lumbar spine and this will be ordered for her today.  A new prescription of physical therapy was provided.  A refill of diclofenac and methocarbamol was also prescribed for her.  She understands that in the setting of her recent kidney infection she should use caution when taking the NSAIDs and she will hold off on that for a week or 2 and take Tylenol in the meantime. Going to [facility] feb 13 to end of May 2024 I will see her back after the MRI for on her return from [facility] for further treatment options which may include lumbar epidural steroid injections.  Trial of acupuncture was also prescribed for her today.  The patient was educated regarding their condition, treatment options as well as prescribed course of treatment.  Risks and benefits as well as alternatives to the proposed treatment were also provided to the patient  They were given the opportunity to have all their questions answered to their satisfaction.  Vital signs were reviewed with the patient and the patient was instructed to followup with their primary care provider for further management. There were no PAs or scribes used in the evaluation, exam or treatment plan discussion. The surgeon was the primary evaluating or treating physician as noted above.

## 2024-01-26 ENCOUNTER — APPOINTMENT (OUTPATIENT)
Dept: MRI IMAGING | Facility: CLINIC | Age: 74
End: 2024-01-26
Payer: MEDICAID

## 2024-01-26 ENCOUNTER — OUTPATIENT (OUTPATIENT)
Dept: OUTPATIENT SERVICES | Facility: HOSPITAL | Age: 74
LOS: 1 days | End: 2024-01-26
Payer: MEDICAID

## 2024-01-26 DIAGNOSIS — S83.512A SPRAIN OF ANTERIOR CRUCIATE LIGAMENT OF LEFT KNEE, INITIAL ENCOUNTER: ICD-10-CM

## 2024-01-26 PROCEDURE — 72148 MRI LUMBAR SPINE W/O DYE: CPT | Mod: 26

## 2024-01-26 PROCEDURE — 72148 MRI LUMBAR SPINE W/O DYE: CPT

## 2024-02-02 ENCOUNTER — APPOINTMENT (OUTPATIENT)
Dept: ORTHOPEDIC SURGERY | Facility: CLINIC | Age: 74
End: 2024-02-02
Payer: MEDICAID

## 2024-02-02 DIAGNOSIS — M54.9 DORSALGIA, UNSPECIFIED: ICD-10-CM

## 2024-02-02 DIAGNOSIS — G89.29 DORSALGIA, UNSPECIFIED: ICD-10-CM

## 2024-02-02 DIAGNOSIS — M54.16 RADICULOPATHY, LUMBAR REGION: ICD-10-CM

## 2024-02-02 PROCEDURE — 99213 OFFICE O/P EST LOW 20 MIN: CPT | Mod: 95

## 2024-02-02 NOTE — PHYSICAL EXAM
[de-identified] : seen with her son AAO X3 [de-identified] : EXAM: [id number] - MR SPINE LUMBAR - ORDERED BY: KARUNA CHUN   PROCEDURE DATE: 01/26/2024    INTERPRETATION: CLINICAL INFORMATION: Chronic lower back pain for one month. Left leg pain.  ADDITIONAL CLINICAL INFORMATION: Other Condition see Clinical Info  TECHNIQUE: Multiplanar, multisequence MRI was performed of the lumbar spine. IV Contrast: NONE  PRIOR STUDIES: No priors available for comparison.  FINDINGS:  BONES: Trace edematous end plate changes are seen at L2-L3. ALIGNMENT: There is dextroscoliosis of the lumbar spine. There is mild left lateral listhesis of L1 on L2 and L2 on L3. There is mild right lateral listhesis of L4 on L5. There is straightening of the lumbar lordosis. There is mild degenerative grade 1 anterolisthesis of L3 on L4. SACROILIAC JOINTS/SACRUM: Moderate right sacroiliac joint arthrosis. Mild left sacroiliac joint arthrosis. CONUS AND CAUDA EQUINA: The distal cord and conus are normal in signal. Conus terminates at L1-L2. VISUALIZED INTRAPELVIC/INTRA-ABDOMINAL SOFT TISSUES: Subcentimeter right-sided renal cyst. PARASPINAL SOFT TISSUES: Fatty infiltration of the posterior paraspinal musculature.   INDIVIDUAL LEVELS:  LOWER THORACIC SPINE: Minimal disc bulges are seen at T11-T12 and T12-L1 resulting in mild flattening of the ventral thecal sac without contacting of the cord. No neural foraminal narrowing at these levels.  L1-L2: Diffuse disc bulge. Mild bilateral facet arthrosis. Findings result in mild spinal canal narrowing and minimal right neural foraminal narrowing. L2-L3: There is diffuse disc bulge with posterior osseous ridging and bilateral facet arthrosis. There is mild right neural foraminal narrowing. There is no central canal or left-sided neural foraminal narrowing. L3-L4: There is uncovering of the posterior disc with a diffuse disc bulge. There is moderate to severe left and moderate right facet arthrosis. There is mild to moderate spinal canal narrowing. There is moderate left and mild right neural foraminal narrowing. There is mild contacting of the exiting left L3 nerve root. L4-L5: There is uncovering of the posterior disc with a diffuse disc bulge. There is bilateral facet arthrosis. There is moderate left and minimal right neural foraminal narrowing. There is mild spinal canal narrowing. L5-S1: There is prominent right lateral marginal osteophyte formation which extends into the right neural foramina. There is a diffuse disc bulge which is asymmetric to the right. There is bilateral facet arthrosis. Findings result in severe right neural foraminal narrowing with contacting of the exiting right L5 nerve root. There is mild left neural foraminal narrowing. There is moderate to severe right lateral recess narrowing with contacting of the descending right S1 nerve root.   IMPRESSION:  Multilevel lumbar spondylosis. Dextroscoliosis of lumbar spine. Mild left lateral listhesis of L1 on L2 and L2 on L3. Mild right lateral listhesis of L4 on L5. Mild degenerative grade 1 anterolisthesis of L3 on L4.  L3-L4: There is moderate to severe left and moderate right facet arthrosis. There is mild to moderate spinal canal narrowing. There is moderate left and mild right neural foraminal narrowing. There is mild contacting of the exiting left L3 nerve root.  L4-L5: There is moderate left and minimal right neural foraminal narrowing. There is mild spinal canal narrowing.  L5-S1: There is prominent right lateral marginal osteophyte formation which extends into the right neural foramina. There is severe right neural foraminal narrowing with contacting of the exiting right L5 nerve root. There is mild left neural foraminal narrowing. There is moderate to severe right lateral recess narrowing with contacting of the descending right S1 nerve root.  --- End of Report ---       JENNIFER STOVALL MD; Attending Radiologist This document has been electronically signed. Jan 30 2024 10:42AM

## 2024-02-02 NOTE — DISCUSSION/SUMMARY
[Medication Risks Reviewed] : Medication risks reviewed [de-identified] : MRI lumbar spine performed previously was independent reviewed by me and findings discussed with the patient and her son today.  She has multilevel disc degeneration with minimal anterolisthesis L3-4 and facet arthropathy at L3-4 L4-5 and L5-S1.  At L5-S1 significant disc protrusion is noted primarily on the right side.  She has bilateral lower back pain with some pain radiation into the buttocks.  She is traveling to [facility] in February 14 and will be back in May.  At this time she would like to try an injection and we will schedule it at her earliest convenience. Bilateral lumbar epidural steroid injection L4 will be scheduled.  Recommend she take the medications Previously prescribed.

## 2024-02-02 NOTE — HISTORY OF PRESENT ILLNESS
[Home] : at home, [unfilled] , at the time of the visit. [Medical Office: ([facility])___] : at the medical office located in  [Family Member] : family member [Stable] : stable [___ mths] : [unfilled] month(s) ago [Verbal consent obtained from patient] : the patient, [unfilled] [FreeTextEntry3] : Son [de-identified] : f/u MRI pain primarily across back bilaterally, some radiation into buttocks not into legs Did not take diclofenac and methocarbamol.  [8] : a current pain level of 8/10

## 2024-02-08 ENCOUNTER — APPOINTMENT (OUTPATIENT)
Dept: ORTHOPEDIC SURGERY | Facility: HOSPITAL | Age: 74
End: 2024-02-08

## 2024-09-17 ENCOUNTER — NON-APPOINTMENT (OUTPATIENT)
Age: 74
End: 2024-09-17

## 2024-09-25 NOTE — PATIENT PROFILE ADULT - NSPROGENPREVTRANSF_GEN_A_NUR
Returned pt call confirmed pt identifiers informed pt of next available with ED and LW pt preferred soonest available that being 12/2 at [facility] for 8:30 scheduled pt appt and added to the wait list   no

## 2024-10-12 ENCOUNTER — EMERGENCY (EMERGENCY)
Facility: HOSPITAL | Age: 74
LOS: 1 days | Discharge: HOME | End: 2024-10-12
Attending: STUDENT IN AN ORGANIZED HEALTH CARE EDUCATION/TRAINING PROGRAM | Admitting: STUDENT IN AN ORGANIZED HEALTH CARE EDUCATION/TRAINING PROGRAM
Payer: MEDICAID

## 2024-10-12 VITALS
DIASTOLIC BLOOD PRESSURE: 68 MMHG | HEART RATE: 60 BPM | RESPIRATION RATE: 16 BRPM | TEMPERATURE: 98 F | SYSTOLIC BLOOD PRESSURE: 126 MMHG | OXYGEN SATURATION: 100 %

## 2024-10-12 VITALS
OXYGEN SATURATION: 99 % | HEIGHT: 63 IN | SYSTOLIC BLOOD PRESSURE: 136 MMHG | DIASTOLIC BLOOD PRESSURE: 82 MMHG | HEART RATE: 66 BPM | TEMPERATURE: 98 F | RESPIRATION RATE: 14 BRPM | WEIGHT: 178.35 LBS

## 2024-10-12 LAB
ALBUMIN SERPL ELPH-MCNC: 3.6 G/DL — SIGNIFICANT CHANGE UP (ref 3.3–5)
ALP SERPL-CCNC: 147 U/L — HIGH (ref 30–120)
ALT FLD-CCNC: 27 U/L — SIGNIFICANT CHANGE UP (ref 10–60)
ANION GAP SERPL CALC-SCNC: 8 MMOL/L — SIGNIFICANT CHANGE UP (ref 5–17)
AST SERPL-CCNC: 18 U/L — SIGNIFICANT CHANGE UP (ref 10–40)
BASOPHILS # BLD AUTO: 0.03 K/UL — SIGNIFICANT CHANGE UP (ref 0–0.2)
BASOPHILS NFR BLD AUTO: 0.5 % — SIGNIFICANT CHANGE UP (ref 0–2)
BILIRUB SERPL-MCNC: 0.5 MG/DL — SIGNIFICANT CHANGE UP (ref 0.2–1.2)
BUN SERPL-MCNC: 15 MG/DL — SIGNIFICANT CHANGE UP (ref 7–23)
CALCIUM SERPL-MCNC: 9.3 MG/DL — SIGNIFICANT CHANGE UP (ref 8.4–10.5)
CHLORIDE SERPL-SCNC: 101 MMOL/L — SIGNIFICANT CHANGE UP (ref 96–108)
CO2 SERPL-SCNC: 28 MMOL/L — SIGNIFICANT CHANGE UP (ref 22–31)
CREAT SERPL-MCNC: 0.81 MG/DL — SIGNIFICANT CHANGE UP (ref 0.5–1.3)
EGFR: 77 ML/MIN/1.73M2 — SIGNIFICANT CHANGE UP
EOSINOPHIL # BLD AUTO: 0.41 K/UL — SIGNIFICANT CHANGE UP (ref 0–0.5)
EOSINOPHIL NFR BLD AUTO: 6.5 % — HIGH (ref 0–6)
GLUCOSE SERPL-MCNC: 110 MG/DL — HIGH (ref 70–99)
HCT VFR BLD CALC: 40.4 % — SIGNIFICANT CHANGE UP (ref 34.5–45)
HGB BLD-MCNC: 12.9 G/DL — SIGNIFICANT CHANGE UP (ref 11.5–15.5)
IMM GRANULOCYTES NFR BLD AUTO: 0.2 % — SIGNIFICANT CHANGE UP (ref 0–0.9)
LYMPHOCYTES # BLD AUTO: 1.89 K/UL — SIGNIFICANT CHANGE UP (ref 1–3.3)
LYMPHOCYTES # BLD AUTO: 30.2 % — SIGNIFICANT CHANGE UP (ref 13–44)
MCHC RBC-ENTMCNC: 28.6 PG — SIGNIFICANT CHANGE UP (ref 27–34)
MCHC RBC-ENTMCNC: 31.9 G/DL — LOW (ref 32–36)
MCV RBC AUTO: 89.6 FL — SIGNIFICANT CHANGE UP (ref 80–100)
MONOCYTES # BLD AUTO: 0.64 K/UL — SIGNIFICANT CHANGE UP (ref 0–0.9)
MONOCYTES NFR BLD AUTO: 10.2 % — SIGNIFICANT CHANGE UP (ref 2–14)
NEUTROPHILS # BLD AUTO: 3.28 K/UL — SIGNIFICANT CHANGE UP (ref 1.8–7.4)
NEUTROPHILS NFR BLD AUTO: 52.4 % — SIGNIFICANT CHANGE UP (ref 43–77)
NRBC # BLD: 0 /100 WBCS — SIGNIFICANT CHANGE UP (ref 0–0)
PLATELET # BLD AUTO: 312 K/UL — SIGNIFICANT CHANGE UP (ref 150–400)
POTASSIUM SERPL-MCNC: 4.4 MMOL/L — SIGNIFICANT CHANGE UP (ref 3.5–5.3)
POTASSIUM SERPL-SCNC: 4.4 MMOL/L — SIGNIFICANT CHANGE UP (ref 3.5–5.3)
PROT SERPL-MCNC: 7.8 G/DL — SIGNIFICANT CHANGE UP (ref 6–8.3)
RBC # BLD: 4.51 M/UL — SIGNIFICANT CHANGE UP (ref 3.8–5.2)
RBC # FLD: 13.5 % — SIGNIFICANT CHANGE UP (ref 10.3–14.5)
SODIUM SERPL-SCNC: 137 MMOL/L — SIGNIFICANT CHANGE UP (ref 135–145)
TROPONIN I, HIGH SENSITIVITY RESULT: 7.5 NG/L — SIGNIFICANT CHANGE UP
TROPONIN I, HIGH SENSITIVITY RESULT: 8.4 NG/L — SIGNIFICANT CHANGE UP
WBC # BLD: 6.26 K/UL — SIGNIFICANT CHANGE UP (ref 3.8–10.5)
WBC # FLD AUTO: 6.26 K/UL — SIGNIFICANT CHANGE UP (ref 3.8–10.5)

## 2024-10-12 PROCEDURE — 84484 ASSAY OF TROPONIN QUANT: CPT

## 2024-10-12 PROCEDURE — 99285 EMERGENCY DEPT VISIT HI MDM: CPT

## 2024-10-12 PROCEDURE — 36415 COLL VENOUS BLD VENIPUNCTURE: CPT

## 2024-10-12 PROCEDURE — 80053 COMPREHEN METABOLIC PANEL: CPT

## 2024-10-12 PROCEDURE — 71046 X-RAY EXAM CHEST 2 VIEWS: CPT | Mod: 26

## 2024-10-12 PROCEDURE — 93005 ELECTROCARDIOGRAM TRACING: CPT

## 2024-10-12 PROCEDURE — 85025 COMPLETE CBC W/AUTO DIFF WBC: CPT

## 2024-10-12 PROCEDURE — 99285 EMERGENCY DEPT VISIT HI MDM: CPT | Mod: 25

## 2024-10-12 PROCEDURE — 71046 X-RAY EXAM CHEST 2 VIEWS: CPT

## 2024-10-12 PROCEDURE — 96374 THER/PROPH/DIAG INJ IV PUSH: CPT

## 2024-10-12 PROCEDURE — 93010 ELECTROCARDIOGRAM REPORT: CPT

## 2024-10-12 RX ORDER — KETOROLAC TROMETHAMINE 10 MG/1
15 TABLET, FILM COATED ORAL ONCE
Refills: 0 | Status: DISCONTINUED | OUTPATIENT
Start: 2024-10-12 | End: 2024-10-12

## 2024-10-12 RX ORDER — MELOXICAM 7.5 MG/1
1 TABLET ORAL
Qty: 14 | Refills: 0
Start: 2024-10-12 | End: 2024-10-25

## 2024-10-12 RX ADMIN — KETOROLAC TROMETHAMINE 15 MILLIGRAM(S): 10 TABLET, FILM COATED ORAL at 11:43

## 2024-10-12 NOTE — ED PROVIDER NOTE - CARE PROVIDER_API CALL
Osiel Hooper  Cardiology  [address] [facility], [address]  [place] [address]  Phone: [phone]  Fax: [phone]  Follow Up Time: 1-3 Days    Alek Yoo [facility] Medicine  [address] [place] [address]  Phone: [phone]  Fax: [phone]  Follow Up Time: 1-3 Days

## 2024-10-12 NOTE — ED PROVIDER NOTE - NSFOLLOWUPINSTRUCTIONS_ED_ALL_ED_FT
Mobic once a day with food  Robaxin twice a day as prescribed   Follow-up with cardiologist  Follow-up with pain management, referral provided as requested       Nonspecific Chest Pain  Chest pain can be caused by many different conditions. Some causes of chest pain can be life-threatening. These will require treatment right away. Serious causes of chest pain include:  Heart attack.  A tear in the body's main blood vessel.  Redness and swelling (inflammation) around your heart.  Blood clot in your lungs.  Other causes of chest pain may not be so serious. These include:  Heartburn.  Anxiety or stress.  Damage to bones or muscles in your chest.  Lung infections.  Chest pain can feel like:  Pain or discomfort in your chest.  Crushing, pressure, aching, or squeezing pain.  Burning or tingling.  Dull or sharp pain that is worse when you move, cough, or take a deep breath.  Pain or discomfort that is also felt in your back, neck, jaw, shoulder, or arm, or pain that spreads to any of these areas.  It is hard to know whether your pain is caused by something that is serious or something that is not so serious. So it is important to see your doctor right away if you have chest pain.    Follow these instructions at home:  Medicines    Take over-the-counter and prescription medicines only as told by your doctor.  If you were prescribed an antibiotic medicine, take it as told by your doctor. Do not stop taking the antibiotic even if you start to feel better.  Lifestyle    A plate along with examples of foods in a healthy diet.  Rest as told by your doctor.  Do not use any products that contain nicotine or tobacco, such as cigarettes, e-cigarettes, and chewing tobacco. If you need help quitting, ask your doctor.  Do not drink alcohol.  Make lifestyle changes as told by your doctor. These may include:  Getting regular exercise. Ask your doctor what activities are safe for you.  Eating a heart-healthy diet. A diet and nutrition specialist (dietitian) can help you to learn healthy eating options.  Staying at a healthy weight.  Treating diabetes or high blood pressure, if needed.  Lowering your stress. Activities such as yoga and relaxation techniques can help.  General instructions    Pay attention to any changes in your symptoms. Tell your doctor about them or any new symptoms.  Avoid any activities that cause chest pain.  Keep all follow-up visits as told by your doctor. This is important. You may need more testing if your chest pain does not go away.  Contact a doctor if:  Your chest pain does not go away.  You feel depressed.  You have a fever.  Get help right away if:  Your chest pain is worse.  You have a cough that gets worse, or you cough up blood.  You have very bad (severe) pain in your belly (abdomen).  You pass out (faint).  You have either of these for no clear reason:  Sudden chest discomfort.  Sudden discomfort in your arms, back, neck, or jaw.  You have shortness of breath at any time.  You suddenly start to sweat, or your skin gets clammy.  You feel sick to your stomach (nauseous).  You throw up (vomit).  You suddenly feel lightheaded or dizzy.  You feel very weak or tired.  Your heart starts to beat fast, or it feels like it is skipping beats.  These symptoms may be an emergency. Do not wait to see if the symptoms will go away. Get medical help right away. Call your local emergency services (911 in the [place] Do not drive yourself to the hospital.    Summary  Chest pain can be caused by many different conditions. The cause may be serious and need treatment right away. If you have chest pain, see your doctor right away.  Follow your doctor's instructions for taking medicines and making lifestyle changes.  Keep all follow-up visits as told by your doctor. This includes visits for any further testing if your chest pain does not go away.  Be sure to know the signs that show that your condition has become worse. Get help right away if you have these symptoms.  This information is not intended to replace advice given to you by your health care provider. Make sure you discuss any questions you have with your health care provider.

## 2024-10-12 NOTE — ED PROVIDER NOTE - OBJECTIVE STATEMENT
74-year-old female with history of hypertension, hypothyroidism, hyperlipidemia, neuropathy, GERD, insomnia presents with intermittent chest pain the past week.  Pain is reproducible to palpation.  No shortness of breath.  Family states "they think it is muscular" but wanted to be safe.  Has been taking occasional Tylenol Motrin with overall improvement of pain but then states pain will return when the medicine wears off.  Patient had a normal stress and echo test in July 2024 which was done routinely.  No history of DVT or PE.  No leg pain or swelling.  No recent travels  PCP Aly Hooper

## 2024-10-12 NOTE — ED ADULT NURSE NOTE - OBJECTIVE STATEMENT
I personally performed the services described in the documentation, reviewed the documentation recorded by the scribe in my presence and it accurately and completely records my words and action. A&Ox4 BIB son for reproducible chest pain, generalized x 2 days.

## 2024-10-12 NOTE — ED PROVIDER NOTE - PATIENT PORTAL LINK FT
You can access the FollowMyHealth Patient Portal offered by [facility] by registering at the following website: [email or web address]. By joining [email or web address]’s FollowMyHealth portal, you will also be able to view your health information using other applications (apps) compatible with our system.

## 2024-10-12 NOTE — ED PROVIDER NOTE - DIFFERENTIAL DIAGNOSIS
Differential Diagnosis Differentials include but not limited to chest wall pain, pericarditis, myocarditis, ACS, anxiety reaction,  Pneumothorax.  Low suspicion for pulmonary embolism.  No tachycardia, tachypnea, hypoxia

## 2024-10-12 NOTE — ED ADULT NURSE NOTE - CHPI ED NUR SYMPTOMS NEG
no SOB, no difficulty breathing/no chills/no congestion/no diaphoresis/no dizziness/no fever/no nausea/no shortness of breath/no syncope/no vomiting

## 2024-10-12 NOTE — ED PROVIDER NOTE - PROGRESS NOTE DETAILS
Patient stable.  Pain improved after Toradol.  No current chest pain.  No shortness of breath.  No tachycardia, tachypnea, hypoxia.  Second troponin unremarkable.  Recommend follow-up with cardiology.  Daughter also requesting referral to pain management.  Referral provided if needed

## 2024-10-12 NOTE — ED PROVIDER NOTE - PROVIDER TOKENS
PROVIDER:[TOKEN:[[id number]],FOLLOWUP:[1-3 Days]],PROVIDER:[TOKEN:[[id number]],FOLLOWUP:[1-3 Days]]

## 2025-01-04 ENCOUNTER — EMERGENCY (EMERGENCY)
Facility: HOSPITAL | Age: 75
LOS: 1 days | Discharge: HOME | End: 2025-01-04
Attending: EMERGENCY MEDICINE | Admitting: EMERGENCY MEDICINE
Payer: MEDICAID

## 2025-01-04 VITALS
TEMPERATURE: 98 F | WEIGHT: 175.93 LBS | HEIGHT: 60 IN | RESPIRATION RATE: 14 BRPM | DIASTOLIC BLOOD PRESSURE: 81 MMHG | SYSTOLIC BLOOD PRESSURE: 150 MMHG | HEART RATE: 89 BPM | OXYGEN SATURATION: 98 %

## 2025-01-04 VITALS
RESPIRATION RATE: 17 BRPM | OXYGEN SATURATION: 97 % | HEART RATE: 68 BPM | DIASTOLIC BLOOD PRESSURE: 81 MMHG | TEMPERATURE: 98 F | SYSTOLIC BLOOD PRESSURE: 130 MMHG

## 2025-01-04 LAB
ALBUMIN SERPL ELPH-MCNC: 3.5 G/DL — SIGNIFICANT CHANGE UP (ref 3.3–5)
ALP SERPL-CCNC: 179 U/L — HIGH (ref 30–120)
ALT FLD-CCNC: 46 U/L — SIGNIFICANT CHANGE UP (ref 10–60)
ANION GAP SERPL CALC-SCNC: 11 MMOL/L — SIGNIFICANT CHANGE UP (ref 5–17)
APPEARANCE UR: CLEAR — SIGNIFICANT CHANGE UP
APTT BLD: 18.2 SEC — LOW (ref 24.5–35.6)
AST SERPL-CCNC: 41 U/L — HIGH (ref 10–40)
BACTERIA # UR AUTO: NEGATIVE /HPF — SIGNIFICANT CHANGE UP
BASOPHILS # BLD AUTO: 0.03 K/UL — SIGNIFICANT CHANGE UP (ref 0–0.2)
BASOPHILS NFR BLD AUTO: 0.5 % — SIGNIFICANT CHANGE UP (ref 0–2)
BILIRUB SERPL-MCNC: 0.4 MG/DL — SIGNIFICANT CHANGE UP (ref 0.2–1.2)
BILIRUB UR-MCNC: NEGATIVE — SIGNIFICANT CHANGE UP
BUN SERPL-MCNC: 11 MG/DL — SIGNIFICANT CHANGE UP (ref 7–23)
CALCIUM SERPL-MCNC: 9.1 MG/DL — SIGNIFICANT CHANGE UP (ref 8.4–10.5)
CHLORIDE SERPL-SCNC: 104 MMOL/L — SIGNIFICANT CHANGE UP (ref 96–108)
CO2 SERPL-SCNC: 23 MMOL/L — SIGNIFICANT CHANGE UP (ref 22–31)
COLOR SPEC: YELLOW — SIGNIFICANT CHANGE UP
CREAT SERPL-MCNC: 0.7 MG/DL — SIGNIFICANT CHANGE UP (ref 0.5–1.3)
DIFF PNL FLD: NEGATIVE — SIGNIFICANT CHANGE UP
EGFR: 91 ML/MIN/1.73M2 — SIGNIFICANT CHANGE UP
EOSINOPHIL # BLD AUTO: 0.38 K/UL — SIGNIFICANT CHANGE UP (ref 0–0.5)
EOSINOPHIL NFR BLD AUTO: 5.9 % — SIGNIFICANT CHANGE UP (ref 0–6)
EPI CELLS # UR: SIGNIFICANT CHANGE UP
FLUAV AG NPH QL: SIGNIFICANT CHANGE UP
FLUBV AG NPH QL: SIGNIFICANT CHANGE UP
GLUCOSE SERPL-MCNC: 121 MG/DL — HIGH (ref 70–99)
GLUCOSE UR QL: NEGATIVE MG/DL — SIGNIFICANT CHANGE UP
HCT VFR BLD CALC: 36.3 % — SIGNIFICANT CHANGE UP (ref 34.5–45)
HGB BLD-MCNC: 12 G/DL — SIGNIFICANT CHANGE UP (ref 11.5–15.5)
IMM GRANULOCYTES NFR BLD AUTO: 0.2 % — SIGNIFICANT CHANGE UP (ref 0–0.9)
INR BLD: 1.02 RATIO — SIGNIFICANT CHANGE UP (ref 0.85–1.16)
KETONES UR-MCNC: NEGATIVE MG/DL — SIGNIFICANT CHANGE UP
LACTATE SERPL-SCNC: 1.3 MMOL/L — SIGNIFICANT CHANGE UP (ref 0.7–2)
LEUKOCYTE ESTERASE UR-ACNC: NEGATIVE — SIGNIFICANT CHANGE UP
LIDOCAIN IGE QN: 44 U/L — SIGNIFICANT CHANGE UP (ref 16–77)
LYMPHOCYTES # BLD AUTO: 1.57 K/UL — SIGNIFICANT CHANGE UP (ref 1–3.3)
LYMPHOCYTES # BLD AUTO: 24.5 % — SIGNIFICANT CHANGE UP (ref 13–44)
MCHC RBC-ENTMCNC: 28.8 PG — SIGNIFICANT CHANGE UP (ref 27–34)
MCHC RBC-ENTMCNC: 33.1 G/DL — SIGNIFICANT CHANGE UP (ref 32–36)
MCV RBC AUTO: 87.3 FL — SIGNIFICANT CHANGE UP (ref 80–100)
MONOCYTES # BLD AUTO: 0.68 K/UL — SIGNIFICANT CHANGE UP (ref 0–0.9)
MONOCYTES NFR BLD AUTO: 10.6 % — SIGNIFICANT CHANGE UP (ref 2–14)
NEUTROPHILS # BLD AUTO: 3.75 K/UL — SIGNIFICANT CHANGE UP (ref 1.8–7.4)
NEUTROPHILS NFR BLD AUTO: 58.3 % — SIGNIFICANT CHANGE UP (ref 43–77)
NITRITE UR-MCNC: NEGATIVE — SIGNIFICANT CHANGE UP
NRBC # BLD: 0 /100 WBCS — SIGNIFICANT CHANGE UP (ref 0–0)
PH UR: 6.5 — SIGNIFICANT CHANGE UP (ref 5–8)
PLATELET # BLD AUTO: 307 K/UL — SIGNIFICANT CHANGE UP (ref 150–400)
POTASSIUM SERPL-MCNC: 4.4 MMOL/L — SIGNIFICANT CHANGE UP (ref 3.5–5.3)
POTASSIUM SERPL-SCNC: 4.4 MMOL/L — SIGNIFICANT CHANGE UP (ref 3.5–5.3)
PROT SERPL-MCNC: 7.5 G/DL — SIGNIFICANT CHANGE UP (ref 6–8.3)
PROT UR-MCNC: 30 MG/DL
PROTHROM AB SERPL-ACNC: 11.8 SEC — SIGNIFICANT CHANGE UP (ref 9.9–13.4)
RBC # BLD: 4.16 M/UL — SIGNIFICANT CHANGE UP (ref 3.8–5.2)
RBC # FLD: 14.8 % — HIGH (ref 10.3–14.5)
RBC CASTS # UR COMP ASSIST: 1 /HPF — SIGNIFICANT CHANGE UP (ref 0–4)
RSV RNA NPH QL NAA+NON-PROBE: SIGNIFICANT CHANGE UP
SARS-COV-2 RNA SPEC QL NAA+PROBE: SIGNIFICANT CHANGE UP
SODIUM SERPL-SCNC: 138 MMOL/L — SIGNIFICANT CHANGE UP (ref 135–145)
SP GR SPEC: 1.01 — SIGNIFICANT CHANGE UP (ref 1–1.03)
UROBILINOGEN FLD QL: 0.2 MG/DL — SIGNIFICANT CHANGE UP (ref 0.2–1)
WBC # BLD: 6.42 K/UL — SIGNIFICANT CHANGE UP (ref 3.8–10.5)
WBC # FLD AUTO: 6.42 K/UL — SIGNIFICANT CHANGE UP (ref 3.8–10.5)
WBC UR QL: 0 /HPF — SIGNIFICANT CHANGE UP (ref 0–5)

## 2025-01-04 PROCEDURE — 74177 CT ABD & PELVIS W/CONTRAST: CPT | Mod: MC

## 2025-01-04 PROCEDURE — 83605 ASSAY OF LACTIC ACID: CPT

## 2025-01-04 PROCEDURE — 71045 X-RAY EXAM CHEST 1 VIEW: CPT

## 2025-01-04 PROCEDURE — 96374 THER/PROPH/DIAG INJ IV PUSH: CPT | Mod: XU

## 2025-01-04 PROCEDURE — 80053 COMPREHEN METABOLIC PANEL: CPT

## 2025-01-04 PROCEDURE — 71045 X-RAY EXAM CHEST 1 VIEW: CPT | Mod: 26

## 2025-01-04 PROCEDURE — 99285 EMERGENCY DEPT VISIT HI MDM: CPT

## 2025-01-04 PROCEDURE — 74177 CT ABD & PELVIS W/CONTRAST: CPT | Mod: 26,MC

## 2025-01-04 PROCEDURE — 85610 PROTHROMBIN TIME: CPT

## 2025-01-04 PROCEDURE — 99284 EMERGENCY DEPT VISIT MOD MDM: CPT | Mod: 25

## 2025-01-04 PROCEDURE — 85730 THROMBOPLASTIN TIME PARTIAL: CPT

## 2025-01-04 PROCEDURE — 87040 BLOOD CULTURE FOR BACTERIA: CPT

## 2025-01-04 PROCEDURE — 87637 SARSCOV2&INF A&B&RSV AMP PRB: CPT

## 2025-01-04 PROCEDURE — 96361 HYDRATE IV INFUSION ADD-ON: CPT

## 2025-01-04 PROCEDURE — 83690 ASSAY OF LIPASE: CPT

## 2025-01-04 PROCEDURE — 81001 URINALYSIS AUTO W/SCOPE: CPT

## 2025-01-04 PROCEDURE — 36415 COLL VENOUS BLD VENIPUNCTURE: CPT

## 2025-01-04 PROCEDURE — 85025 COMPLETE CBC W/AUTO DIFF WBC: CPT

## 2025-01-04 RX ORDER — ACETAMINOPHEN 80 MG/.8ML
1000 SOLUTION/ DROPS ORAL ONCE
Refills: 0 | Status: COMPLETED | OUTPATIENT
Start: 2025-01-04 | End: 2025-01-04

## 2025-01-04 RX ORDER — SODIUM CHLORIDE 9 MG/ML
1000 INJECTION, SOLUTION INTRAMUSCULAR; INTRAVENOUS; SUBCUTANEOUS ONCE
Refills: 0 | Status: COMPLETED | OUTPATIENT
Start: 2025-01-04 | End: 2025-01-04

## 2025-01-04 RX ADMIN — ACETAMINOPHEN 1000 MILLIGRAM(S): 80 SOLUTION/ DROPS ORAL at 17:23

## 2025-01-04 RX ADMIN — ACETAMINOPHEN 1000 MILLIGRAM(S): 80 SOLUTION/ DROPS ORAL at 17:08

## 2025-01-04 RX ADMIN — ACETAMINOPHEN 400 MILLIGRAM(S): 80 SOLUTION/ DROPS ORAL at 16:53

## 2025-01-04 RX ADMIN — SODIUM CHLORIDE 1000 MILLILITER(S): 9 INJECTION, SOLUTION INTRAMUSCULAR; INTRAVENOUS; SUBCUTANEOUS at 16:52

## 2025-01-04 RX ADMIN — SODIUM CHLORIDE 1000 MILLILITER(S): 9 INJECTION, SOLUTION INTRAMUSCULAR; INTRAVENOUS; SUBCUTANEOUS at 17:52

## 2025-01-04 NOTE — ED PROVIDER NOTE - PATIENT PORTAL LINK FT
You can access the FollowMyHealth Patient Portal offered by [facility] by registering at the following website: [email or web address]. By joining [email or web address]’s FollowMyHealth portal, you will also be able to view your health information using other applications (apps) compatible with our system.

## 2025-01-04 NOTE — ED ADULT NURSE NOTE - OBJECTIVE STATEMENT
pt arrives to ED with son, pt reports diffuse abd pain and fever for 5 days. denies urinary complaints. denies nausea, vomiting and diarrhea. reports normal BM. abd soft, tender mid lower and RUQ. reports normal appetite.

## 2025-01-04 NOTE — ED PROVIDER NOTE - OBJECTIVE STATEMENT
74-year-old female with history of hypertension hypothyroid neuropathy brought by son for evaluation of generalized abdominal pain and fever since returning from a trip to [place] yesterday.  States she was sick for a while there as well.  Denies specific sick contact.  Denies headache chest pain cough shortness of breath nausea vomiting diarrhea dysuria hematuria or other symptom.  Has had history of UTI in the past.  Did not take anything for symptoms.  Her PCP is Dr. Marques.

## 2025-01-04 NOTE — ED PROVIDER NOTE - CARE PROVIDER_API CALL
Aly Marques  Internal Medicine  [address] [place] [address], [address]  [place] [address]  Phone: [phone]  Fax: [phone]  Established Patient  Follow Up Time: 1-3 Days

## 2025-01-04 NOTE — ED PROVIDER NOTE - GASTROINTESTINAL, MLM
Bowel sounds positive, soft, vague diffuse tenderness but no rebound.  No mass or HSM.  No CVA tenderness.

## 2025-01-04 NOTE — ED PROVIDER NOTE - ENMT, MLM
You may remove your bandage in 24 hours and then shower. After you shower, leave the puncture site open to air. No lotions/ointments over puncture site.    Avoid bath tubs, swimming pools, and hot tubs.    Monitor for signs and symptoms of infection including redness, swelling at site, fevers, pus-like drainage or significant bleeding    For any questions or concerns, please call Interventional Radiology at [phone] between the hours of 7:30am-5:00pm or seek medical attention immediately.    You may resume your scheduled home medications.         Want to Say “Thank You” to a Nurse?  The JAMES Award® was created in memory of BEL Narayan by his family to say thank you to bedside nurses who provide an outstanding level of care.    Submit a nomination using any method below.     OR    [email or web address]  Or visit the Resource section   on your [employer]    
Airway patent, Nasal mucosa clear. Mouth with normal mucosa. Throat has no vesicles, no oropharyngeal exudates and uvula is midline.

## 2025-01-04 NOTE — ED PROVIDER NOTE - NSFOLLOWUPINSTRUCTIONS_ED_ALL_ED_FT
Gastroenteritis    WHAT YOU NEED TO KNOW:    What is gastroenteritis? Gastroenteritis, or stomach flu, is an infection of the stomach and intestines. Causes may include bacteria, parasites, viruses, chemicals, or reactions to medicines.  Digestive Tract    What increases my risk for gastroenteritis?    Close contact with an infected person or animal    Age older than 65    Travel to other countries    Eating spoiled food such as eggs, raw vegetables, shellfish, or meat that is not fully cooked    Drinking water that is not clean, such as when you camp or travel    Certain medicines, such as antibiotics, antacids, or chemotherapy    Conditions that weaken your immune system  What are the signs and symptoms of gastroenteritis?    Diarrhea or gas    Nausea, vomiting, or poor appetite    Abdominal cramps, pain, or gurgling    Fever    Tiredness or weakness    Headache or muscle aches  How is gastroenteritis diagnosed and treated? Your healthcare provider will ask about your medical history and symptoms. Your provider will examine you and check for signs of dehydration. Tell your provider how often you are vomiting or have diarrhea and what medicines you take. You may need a blood or bowel movement sample tested for the germ causing your gastroenteritis. Symptoms may go away without treatment. Medicines may be given to slow or stop your diarrhea or vomiting. You may also need medicines to treat an infection caused by bacteria or a parasite.    How can I manage my gastroenteritis?    Drink liquids as directed. Ask your healthcare provider how much liquid to drink each day, and which liquids are best for you. You may also need to drink an oral rehydration solution (ORS). An ORS has the right amounts of sugar, salt, and minerals in water to replace body fluids.    Eat bland foods. When you feel hungry, begin eating soft, bland foods. Examples are bananas, clear soup, potatoes, and applesauce. Do not have dairy products, alcohol, sugary drinks, or drinks with caffeine until you feel better. Avoid eating high-fat or fast foods.    Eat small, frequent meals throughout the day. Your stomach may tolerate small meals every 2 to 3 hours instead of 3 large meals.    Rest as much as possible. Slowly start to do more each day when you begin to feel better.  How can I prevent gastroenteritis? Gastroenteritis can spread easily. Keep yourself, your family, and your surroundings clean to help prevent the spread of gastroenteritis:    Wash your hands often. Use soap and water. Wash your hands after you use the bathroom, change a child's diapers, or sneeze. Wash your hands before you prepare or eat food.  Handwashing      Clean surfaces and do laundry often. Wash your clothes and towels separately from the rest of the laundry. Clean surfaces in your home with antibacterial  or bleach.    Clean food thoroughly and cook safely. Wash raw vegetables before you cook. Cook meat, fish, and eggs fully. Do not use the same dishes for raw meat as you do for other foods. Refrigerate any leftover food immediately.    Be aware when you camp or travel. Drink only clean water. Do not drink from rivers or lakes unless you purify or boil the water first. When you travel, drink bottled water and do not add ice. Do not eat fruit that has not been peeled. Do not eat raw fish or meat that is not fully cooked.  When should I seek immediate care?    You see blood in your vomit or diarrhea.    You cannot stop vomiting.    You have severe abdominal pain, or your abdomen is swollen.    You have not urinated for 12 hours.    You feel like you are going to faint.  When should I call my doctor?    You have a fever that does not go away.    You continue to vomit or have diarrhea, even after treatment.    You see worms in your diarrhea.    Your mouth or eyes are dry. You are not urinating as much or as often.    You have questions or concerns about your condition or care.  CARE AGREEMENT:    You have the right to help plan your care. Learn about your health condition and how it may be treated. Discuss treatment options with your healthcare providers to decide what care you want to receive. You always have the right to refuse treatment.

## 2025-01-04 NOTE — ED PROVIDER NOTE - CLINICAL SUMMARY MEDICAL DECISION MAKING FREE TEXT BOX
74-year-old female with history of hypertension hypothyroid neuropathy brought by son for evaluation of generalized abdominal pain and fever since returning from a trip to [place] yesterday.  States she was sick for a while there as well.  Denies specific sick contact.  Denies headache chest pain cough shortness of breath nausea vomiting diarrhea dysuria hematuria or other symptom.  Has had history of UTI in the past.  Did not take anything for symptoms.  Her PCP is Dr. Marques.    Physical exam reveals vague abdominal pain but otherwise unrevealing.  Impression abdominal pain rule out colitis rule out diverticulitis rule out gastroenteritis.  Plan labs CT abdomen.  Reassess

## 2025-02-20 NOTE — ED PROVIDER NOTE - NSICDXPASTMEDICALHX_GEN_ALL_CORE_FT
Post-Op Assessment Note    CV Status:  Stable  Pain Score: 0    Pain management: adequate       Mental Status:  Awake   Hydration Status:  Euvolemic   PONV Controlled:  Controlled   Airway Patency:  Patent     Post Op Vitals Reviewed: Yes    No anethesia notable event occurred.    Staff: CRNA, Anesthesiologist           Last Filed PACU Vitals:  Vitals Value Taken Time   Temp     Pulse 73 02/20/25 1446   /68 02/20/25 1446   Resp 20 02/20/25 1446   SpO2 98 % 02/20/25 1446              
PAST MEDICAL HISTORY:  GERD (gastroesophageal reflux disease)     HLD (hyperlipidemia)     HTN (hypertension)     Hypothyroid     Insomnia     Neuropathy

## 2025-08-07 ENCOUNTER — APPOINTMENT (OUTPATIENT)
Dept: RHEUMATOLOGY | Facility: CLINIC | Age: 75
End: 2025-08-07